# Patient Record
Sex: FEMALE | Race: WHITE | NOT HISPANIC OR LATINO | Employment: OTHER | URBAN - METROPOLITAN AREA
[De-identification: names, ages, dates, MRNs, and addresses within clinical notes are randomized per-mention and may not be internally consistent; named-entity substitution may affect disease eponyms.]

---

## 2017-01-09 ENCOUNTER — PATIENT OUTREACH (OUTPATIENT)
Dept: ADMINISTRATIVE | Facility: HOSPITAL | Age: 67
End: 2017-01-09

## 2017-01-09 RX ORDER — TRIAMCINOLONE ACETONIDE 1 MG/G
CREAM TOPICAL
Refills: 0 | OUTPATIENT
Start: 2017-01-09

## 2017-01-09 NOTE — PROGRESS NOTES
Most recent 2016 hemoglobin A1C routed to [employer] as attestation documentation for Diabetes Care-Blood Sugar Controlled measure. Measure has been met.

## 2017-01-13 RX ORDER — METFORMIN HYDROCHLORIDE 1000 MG/1
TABLET ORAL
Qty: 180 TABLET | Refills: 0 | Status: SHIPPED | OUTPATIENT
Start: 2017-01-13 | End: 2017-04-14 | Stop reason: SDUPTHER

## 2017-01-19 RX ORDER — FENOFIBRATE 200 MG/1
CAPSULE ORAL
Qty: 30 CAPSULE | Refills: 0 | Status: SHIPPED | OUTPATIENT
Start: 2017-01-19 | End: 2017-02-20 | Stop reason: SDUPTHER

## 2017-01-19 RX ORDER — CHLORTHALIDONE 50 MG/1
TABLET ORAL
Qty: 30 TABLET | Refills: 0 | Status: SHIPPED | OUTPATIENT
Start: 2017-01-19 | End: 2017-02-20 | Stop reason: SDUPTHER

## 2017-01-23 ENCOUNTER — OFFICE VISIT (OUTPATIENT)
Dept: INTERNAL MEDICINE | Facility: CLINIC | Age: 67
End: 2017-01-23
Payer: MEDICARE

## 2017-01-23 ENCOUNTER — LAB VISIT (OUTPATIENT)
Dept: LAB | Facility: HOSPITAL | Age: 67
End: 2017-01-23
Attending: INTERNAL MEDICINE
Payer: MEDICARE

## 2017-01-23 VITALS
DIASTOLIC BLOOD PRESSURE: 70 MMHG | BODY MASS INDEX: 31.64 KG/M2 | TEMPERATURE: 100 F | OXYGEN SATURATION: 95 % | HEART RATE: 104 BPM | HEIGHT: 59 IN | WEIGHT: 156.94 LBS | SYSTOLIC BLOOD PRESSURE: 128 MMHG

## 2017-01-23 DIAGNOSIS — R30.0 DYSURIA: Primary | ICD-10-CM

## 2017-01-23 DIAGNOSIS — N30.00 ACUTE CYSTITIS WITHOUT HEMATURIA: ICD-10-CM

## 2017-01-23 DIAGNOSIS — M62.838 MUSCLE SPASM: ICD-10-CM

## 2017-01-23 LAB
BACTERIA #/AREA URNS HPF: ABNORMAL /HPF
BILIRUB UR QL STRIP: NEGATIVE
CLARITY UR: ABNORMAL
COLOR UR: YELLOW
GLUCOSE UR QL STRIP: ABNORMAL
HGB UR QL STRIP: NEGATIVE
KETONES UR QL STRIP: NEGATIVE
LEUKOCYTE ESTERASE UR QL STRIP: ABNORMAL
MICROSCOPIC COMMENT: ABNORMAL
NITRITE UR QL STRIP: POSITIVE
NON-SQ EPI CELLS #/AREA URNS HPF: 1 /HPF
PH UR STRIP: 5 [PH] (ref 5–8)
PROT UR QL STRIP: NEGATIVE
SP GR UR STRIP: 1.01 (ref 1–1.03)
SQUAMOUS #/AREA URNS HPF: 4 /HPF
URN SPEC COLLECT METH UR: ABNORMAL
WBC #/AREA URNS HPF: >100 /HPF (ref 0–5)
WBC CLUMPS URNS QL MICRO: ABNORMAL

## 2017-01-23 PROCEDURE — 3045F PR MOST RECENT HEMOGLOBIN A1C LEVEL 7.0-9.0%: CPT | Mod: S$GLB,,, | Performed by: INTERNAL MEDICINE

## 2017-01-23 PROCEDURE — 1126F AMNT PAIN NOTED NONE PRSNT: CPT | Mod: S$GLB,,, | Performed by: INTERNAL MEDICINE

## 2017-01-23 PROCEDURE — 2022F DILAT RTA XM EVC RTNOPTHY: CPT | Mod: S$GLB,,, | Performed by: INTERNAL MEDICINE

## 2017-01-23 PROCEDURE — 87088 URINE BACTERIA CULTURE: CPT

## 2017-01-23 PROCEDURE — 99214 OFFICE O/P EST MOD 30 MIN: CPT | Mod: S$GLB,,, | Performed by: INTERNAL MEDICINE

## 2017-01-23 PROCEDURE — 99999 PR PBB SHADOW E&M-EST. PATIENT-LVL III: CPT | Mod: PBBFAC,,, | Performed by: INTERNAL MEDICINE

## 2017-01-23 PROCEDURE — 3074F SYST BP LT 130 MM HG: CPT | Mod: S$GLB,,, | Performed by: INTERNAL MEDICINE

## 2017-01-23 PROCEDURE — 87186 SC STD MICRODIL/AGAR DIL: CPT

## 2017-01-23 PROCEDURE — 87086 URINE CULTURE/COLONY COUNT: CPT

## 2017-01-23 PROCEDURE — 3078F DIAST BP <80 MM HG: CPT | Mod: S$GLB,,, | Performed by: INTERNAL MEDICINE

## 2017-01-23 PROCEDURE — 1157F ADVNC CARE PLAN IN RCRD: CPT | Mod: S$GLB,,, | Performed by: INTERNAL MEDICINE

## 2017-01-23 PROCEDURE — 1159F MED LIST DOCD IN RCRD: CPT | Mod: S$GLB,,, | Performed by: INTERNAL MEDICINE

## 2017-01-23 PROCEDURE — 87077 CULTURE AEROBIC IDENTIFY: CPT

## 2017-01-23 PROCEDURE — 4010F ACE/ARB THERAPY RXD/TAKEN: CPT | Mod: S$GLB,,, | Performed by: INTERNAL MEDICINE

## 2017-01-23 PROCEDURE — 1160F RVW MEDS BY RX/DR IN RCRD: CPT | Mod: S$GLB,,, | Performed by: INTERNAL MEDICINE

## 2017-01-23 PROCEDURE — 99499 UNLISTED E&M SERVICE: CPT | Mod: S$GLB,,, | Performed by: INTERNAL MEDICINE

## 2017-01-23 RX ORDER — SULFAMETHOXAZOLE AND TRIMETHOPRIM 800; 160 MG/1; MG/1
1 TABLET ORAL 2 TIMES DAILY
Qty: 20 TABLET | Refills: 0 | Status: SHIPPED | OUTPATIENT
Start: 2017-01-23 | End: 2017-02-02

## 2017-01-23 RX ORDER — CYCLOBENZAPRINE HCL 5 MG
5 TABLET ORAL NIGHTLY PRN
Qty: 30 TABLET | Refills: 1 | Status: SHIPPED | OUTPATIENT
Start: 2017-01-23 | End: 2017-07-15 | Stop reason: SDUPTHER

## 2017-01-23 NOTE — MR AVS SNAPSHOT
On license of [facility]  [address]  [place] [address]  Phone: [phone]  Fax: [phone]                  Luda Jc   2017 2:40 PM   Office Visit    Description:  Female : 1950   Provider:  Leanne Bonilla DO   Department[facility]  [facility]           Reason for Visit     Urinary Tract Infection           Diagnoses this Visit        Comments    Dysuria    -  Primary     Acute cystitis without hematuria         Uncontrolled type 2 diabetes mellitus with stage 3 chronic kidney disease, with long-term current use of insulin         Muscle spasm                To Do List           Future Appointments        Provider Department Dept Phone    3/6/2017 1:30 PM Arabella Srivastava, NP-C, CDE [facility] [phone]    2017 9:10 AM LABORATORY, [address] [facility] [phone]    2017 11:00 AM Leanne Bonilla DO On license of [facility] [phone]      Goals (5 Years of Data)              16    Exercise 3x per week (30 min per time)           HEMOGLOBIN A1C < 7.0   7.6  10.7  8.6    LDL CHOLESTEROL < 100     Invalid, Trig>400.0  Invalid, Trig>400.0       These Medications        Disp Refills Start End    sulfamethoxazole-trimethoprim 800-160mg (BACTRIM DS) 800-160 mg Tab 20 tablet 0 2017    Take 1 tablet by mouth 2 (two) times daily. - Oral    Pharmacy: [facility] [place], [place] - [address] [address] Ph #: [phone]       cyclobenzaprine (FLEXERIL) 5 MG tablet 30 tablet 1 2017     Take 1 tablet (5 mg total) by mouth nightly as needed for Muscle spasms. - Oral    Pharmacy: [facility] [place], [place] - [address] [address] Ph #: [phone]         [facility]     [facility] On Call Nurse Care Line -  Assistance  Registered nurses in the [facility] On Call Center provide clinical advisement, health education, appointment booking, and  "other advisory services.  Call for this free service at [phone].             Medications           Message regarding Medications     Verify the changes and/or additions to your medication regime listed below are the same as discussed with your clinician today.  If any of these changes or additions are incorrect, please notify your healthcare provider.        START taking these NEW medications        Refills    sulfamethoxazole-trimethoprim 800-160mg (BACTRIM DS) 800-160 mg Tab 0    Sig: Take 1 tablet by mouth 2 (two) times daily.    Class: Normal    Route: Oral    cyclobenzaprine (FLEXERIL) 5 MG tablet 1    Sig: Take 1 tablet (5 mg total) by mouth nightly as needed for Muscle spasms.    Class: Normal    Route: Oral           Verify that the below list of medications is an accurate representation of the medications you are currently taking.  If none reported, the list may be blank. If incorrect, please contact your healthcare provider. Carry this list with you in case of emergency.           Current Medications     atorvastatin (LIPITOR) 80 MG tablet Take 1 tablet (80 mg total) by mouth once daily.    blood sugar diagnostic (CONTOUR TEST STRIPS) Strp USE ONE STRIP TO CHECK GLUCOSE 2 TO 3 TIMES DAILY AS DIRECTED    chlorthalidone (HYGROTEN) 50 MG Tab TAKE ONE TABLET BY MOUTH ONCE DAILY    esomeprazole (NEXIUM) 20 MG capsule Take 20 mg by mouth before breakfast.    fenofibrate micronized (LOFIBRA) 200 MG Cap TAKE ONE CAPSULE BY MOUTH ONCE DAILY WITH BREAKFAST    glimepiride (AMARYL) 2 MG tablet Take 1 tablet (2 mg total) by mouth daily with breakfast.    insulin glargine (LANTUS SOLOSTAR) 100 unit/mL (3 mL) InPn pen 34 units in the morning and 30 units in the evening.    insulin lispro (HUMALOG KWIKPEN) 100 unit/mL InPn pen Inject 10 Units into the skin 3 (three) times daily before meals.    insulin needles, disposable, (BD ULTRA-FINE FATEMEH PEN NEEDLES) 32 x 5/32 " Ndle 1 Stick by Misc.(Non-Drug; Combo Route) " "route as directed. 250.00 IDDM    lisinopril (PRINIVIL,ZESTRIL) 40 MG tablet Take 1 tablet (40 mg total) by mouth once daily.    metformin (GLUCOPHAGE) 1000 MG tablet TAKE ONE TABLET BY MOUTH TWICE DAILY. PLEASE CALL OFFICE. DR. SAHNI LEAVING IN SEVERAL WEEKS. YOU NEED A NEW PCP    MICROLET LANCET Misc USE AS DIRECTED TWICE DAILY    nystatin (MYCOSTATIN) powder Apply topically 2 (two) times daily.    tamoxifen (NOLVADEX) 20 MG Tab     triamcinolone acetonide 0.1% (KENALOG) 0.1 % cream Apply topically 3 (three) times daily.    cyclobenzaprine (FLEXERIL) 5 MG tablet Take 1 tablet (5 mg total) by mouth nightly as needed for Muscle spasms.    sulfamethoxazole-trimethoprim 800-160mg (BACTRIM DS) 800-160 mg Tab Take 1 tablet by mouth 2 (two) times daily.           Clinical Reference Information           Vital Signs - Last Recorded  Most recent update: 1/23/2017  2:40 PM by Lesa Tidwell    BP Pulse Temp Ht    128/70 (BP Location: Left arm, Patient Position: Sitting, BP Method: Manual) 104 99.5 °F (37.5 °C) (Tympanic) 4' 11" (1.499 m)    Wt SpO2 BMI    71.2 kg (156 lb 15.5 oz) 95% 31.7 kg/m2      Blood Pressure          Most Recent Value    BP  128/70      Allergies as of 1/23/2017     No Known Allergies      Immunizations Administered on Date of Encounter - 1/23/2017     None      Orders Placed During Today's Visit      Normal Orders This Visit    Urinalysis Microscopic     Urinalysis     Future Labs/Procedures Expected by Expires    Urine culture  1/23/2017 3/24/2018      "

## 2017-01-25 NOTE — PROGRESS NOTES
Luda Jc  66 y.o. White female    Chief Complaint   Patient presents with    Urinary Tract Infection     HPI: Presents to the clinic with complaint of dysuria for the past few days. She denies fever or back pain. She denies blood in her urine. She is a diabetes and although her diabetes has improved, she is not at goal. She has CKD III as well.   She is complaining of muscle spasms primarily in her legs during the day and worse at night. She states this has been going on for a while, but is getting worse. She denies numbness, tingling or weakness in her extremities.     Past Medical History   Diagnosis Date    Abnormal LFTs (liver function tests) 10/11/2014    Arthritis     Diabetes mellitus with stage 3 chronic kidney disease 10/11/2014    Insulin long-term use 10/11/2014    Obesity     Other and unspecified hyperlipidemia     Stage II infiltrating ductal carcinoma of breast 6/16/2014     Followed by Dr. Virgen     Unspecified essential hypertension        Current Outpatient Prescriptions on File Prior to Visit   Medication Sig Dispense Refill    atorvastatin (LIPITOR) 80 MG tablet Take 1 tablet (80 mg total) by mouth once daily. 90 tablet 3    blood sugar diagnostic (CONTOUR TEST STRIPS) Strp USE ONE STRIP TO CHECK GLUCOSE 2 TO 3 TIMES DAILY AS DIRECTED 100 each 11    chlorthalidone (HYGROTEN) 50 MG Tab TAKE ONE TABLET BY MOUTH ONCE DAILY 30 tablet 0    esomeprazole (NEXIUM) 20 MG capsule Take 20 mg by mouth before breakfast.      fenofibrate micronized (LOFIBRA) 200 MG Cap TAKE ONE CAPSULE BY MOUTH ONCE DAILY WITH BREAKFAST 30 capsule 0    glimepiride (AMARYL) 2 MG tablet Take 1 tablet (2 mg total) by mouth daily with breakfast. 60 tablet 6    insulin glargine (LANTUS SOLOSTAR) 100 unit/mL (3 mL) InPn pen 34 units in the morning and 30 units in the evening. 3 Box 0    insulin lispro (HUMALOG KWIKPEN) 100 unit/mL InPn pen Inject 10 Units into the skin 3 (three) times daily before meals. 1 Box  "6    insulin needles, disposable, (BD ULTRA-FINE FATEMEH PEN NEEDLES) 32 x 5/32 " Ndle 1 Stick by Misc.(Non-Drug; Combo Route) route as directed. 250.00 IDDM 100 each 11    lisinopril (PRINIVIL,ZESTRIL) 40 MG tablet Take 1 tablet (40 mg total) by mouth once daily. 30 tablet 3    metformin (GLUCOPHAGE) 1000 MG tablet TAKE ONE TABLET BY MOUTH TWICE DAILY. PLEASE CALL OFFICE. DR. SAHNI LEAVING IN SEVERAL WEEKS. YOU NEED A NEW  tablet 0    MICROLET LANCET [facility] USE AS DIRECTED TWICE DAILY 100 each 11    nystatin (MYCOSTATIN) powder Apply topically 2 (two) times daily. 30 g 1    tamoxifen (NOLVADEX) 20 MG Tab       triamcinolone acetonide 0.1% (KENALOG) 0.1 % cream Apply topically 3 (three) times daily. 15 g 3     No current facility-administered medications on file prior to visit.        Allergies:  Review of patient's allergies indicates:  No Known Allergies    PHYSICAL EXAM:  VITAL SIGNS: Reviewed  GENERAL: Alert and oriented, no acute distress  HEART: Normal S1 and S2, regular rate and rhythm  LUNGS: Bilaterally clear to auscultation, respirations unlabored    ASSESSMENT/PLAN:  Luda GOMEZ was seen today for urinary tract infection.    Diagnoses and all orders for this visit:    Dysuria  -     Urinalysis--positive for a UTI    Acute cystitis without hematuria  -     sulfamethoxazole-trimethoprim 800-160mg (BACTRIM DS) 800-160 mg Tab; Take 1 tablet by mouth 2 (two) times daily.  -     Recommend increased water intake and regular voiding   -     Urine culture; Future    Uncontrolled type 2 diabetes mellitus with stage 3 chronic kidney disease, with long-term current use of insulin  -     Continue current management  -     Advised to avoid NSAIDs    Muscle spasm  -     cyclobenzaprine (FLEXERIL) 5 MG tablet; Take 1 tablet (5 mg total) by mouth nightly as needed for Muscle spasms.    F/U in 3 months      "

## 2017-01-26 LAB — BACTERIA UR CULT: NORMAL

## 2017-02-07 RX ORDER — GLIMEPIRIDE 2 MG/1
TABLET ORAL
Qty: 60 TABLET | Refills: 6 | Status: SHIPPED | OUTPATIENT
Start: 2017-02-07 | End: 2018-02-21 | Stop reason: SDUPTHER

## 2017-02-20 RX ORDER — FENOFIBRATE 200 MG/1
CAPSULE ORAL
Qty: 30 CAPSULE | Refills: 6 | Status: SHIPPED | OUTPATIENT
Start: 2017-02-20 | End: 2017-09-26 | Stop reason: SDUPTHER

## 2017-02-20 RX ORDER — CHLORTHALIDONE 50 MG/1
TABLET ORAL
Qty: 30 TABLET | Refills: 6 | Status: SHIPPED | OUTPATIENT
Start: 2017-02-20 | End: 2017-04-28 | Stop reason: SDUPTHER

## 2017-03-03 RX ORDER — INSULIN GLARGINE 100 [IU]/ML
INJECTION, SOLUTION SUBCUTANEOUS
Qty: 45 ML | Refills: 1 | Status: SHIPPED | OUTPATIENT
Start: 2017-03-03 | End: 2017-10-06 | Stop reason: SDUPTHER

## 2017-03-10 ENCOUNTER — PATIENT OUTREACH (OUTPATIENT)
Dept: ADMINISTRATIVE | Facility: HOSPITAL | Age: 67
End: 2017-03-10

## 2017-03-10 NOTE — PROGRESS NOTES
Diabetic [facility] for Diabetes Care-Eye Exam. Measure has been satisfied.

## 2017-03-10 NOTE — PROGRESS NOTES
Last documented Hemoglobing A1C [facility] to [facility] documentation for diabetes Care. Measure has been satisfied.

## 2017-03-21 RX ORDER — INSULIN ASPART 100 [IU]/ML
10 INJECTION, SOLUTION INTRAVENOUS; SUBCUTANEOUS
Qty: 1 BOX | Refills: 3 | Status: SHIPPED | OUTPATIENT
Start: 2017-03-21 | End: 2018-03-15 | Stop reason: SDUPTHER

## 2017-03-21 RX ORDER — INSULIN LISPRO 100 [IU]/ML
10 INJECTION, SOLUTION INTRAVENOUS; SUBCUTANEOUS
Qty: 1 BOX | Refills: 3 | Status: SHIPPED | OUTPATIENT
Start: 2017-03-21 | End: 2017-03-21 | Stop reason: CLARIF

## 2017-03-21 NOTE — TELEPHONE ENCOUNTER
Spoke with patient's . Updated him a fax was received from the pharmacy stating Humalog is no longer covered under pt's formulary, and Novolog is preferred. New Rx sent in for covered insulin. Pt's  voiced understanding, and stated he would inform pt of change.

## 2017-03-31 RX ORDER — LISINOPRIL 40 MG/1
TABLET ORAL
Qty: 30 TABLET | Refills: 0 | Status: SHIPPED | OUTPATIENT
Start: 2017-03-31 | End: 2017-04-28 | Stop reason: SDUPTHER

## 2017-03-31 RX ORDER — ATORVASTATIN CALCIUM 80 MG/1
TABLET, FILM COATED ORAL
Qty: 90 TABLET | Refills: 0 | Status: SHIPPED | OUTPATIENT
Start: 2017-03-31 | End: 2017-06-30 | Stop reason: SDUPTHER

## 2017-04-17 RX ORDER — METFORMIN HYDROCHLORIDE 1000 MG/1
1000 TABLET ORAL 2 TIMES DAILY WITH MEALS
Qty: 180 TABLET | Refills: 1 | Status: SHIPPED | OUTPATIENT
Start: 2017-04-17 | End: 2017-10-16 | Stop reason: SDUPTHER

## 2017-04-20 ENCOUNTER — PATIENT OUTREACH (OUTPATIENT)
Dept: ADMINISTRATIVE | Facility: HOSPITAL | Age: 67
End: 2017-04-20
Payer: MEDICARE

## 2017-04-20 DIAGNOSIS — Z78.0 ASYMPTOMATIC MENOPAUSE: Primary | ICD-10-CM

## 2017-04-24 ENCOUNTER — APPOINTMENT (OUTPATIENT)
Dept: RADIOLOGY | Facility: CLINIC | Age: 67
End: 2017-04-24
Payer: MEDICARE

## 2017-04-24 DIAGNOSIS — Z78.0 ASYMPTOMATIC MENOPAUSE: ICD-10-CM

## 2017-04-24 PROCEDURE — 77080 DXA BONE DENSITY AXIAL: CPT | Mod: TC

## 2017-04-24 PROCEDURE — 77080 DXA BONE DENSITY AXIAL: CPT | Mod: 26,,, | Performed by: INTERNAL MEDICINE

## 2017-04-28 ENCOUNTER — OFFICE VISIT (OUTPATIENT)
Dept: INTERNAL MEDICINE | Facility: CLINIC | Age: 67
End: 2017-04-28
Payer: MEDICARE

## 2017-04-28 VITALS
WEIGHT: 161.19 LBS | TEMPERATURE: 99 F | HEART RATE: 94 BPM | HEIGHT: 59 IN | BODY MASS INDEX: 32.49 KG/M2 | SYSTOLIC BLOOD PRESSURE: 134 MMHG | DIASTOLIC BLOOD PRESSURE: 68 MMHG

## 2017-04-28 DIAGNOSIS — Z23 IMMUNIZATION DUE: ICD-10-CM

## 2017-04-28 DIAGNOSIS — M85.80 OSTEOPENIA WITH HIGH RISK OF FRACTURE: ICD-10-CM

## 2017-04-28 DIAGNOSIS — E78.5 HYPERLIPIDEMIA LDL GOAL <100: ICD-10-CM

## 2017-04-28 DIAGNOSIS — I10 HYPERTENSION GOAL BP (BLOOD PRESSURE) < 130/80: Chronic | ICD-10-CM

## 2017-04-28 DIAGNOSIS — M89.9 DISORDER OF BONE: ICD-10-CM

## 2017-04-28 DIAGNOSIS — R05.9 COUGH: ICD-10-CM

## 2017-04-28 DIAGNOSIS — C50.911: ICD-10-CM

## 2017-04-28 DIAGNOSIS — E78.1 HYPERTRIGLYCERIDEMIA: Primary | ICD-10-CM

## 2017-04-28 PROCEDURE — G0009 ADMIN PNEUMOCOCCAL VACCINE: HCPCS | Mod: S$GLB,,, | Performed by: INTERNAL MEDICINE

## 2017-04-28 PROCEDURE — 99499 UNLISTED E&M SERVICE: CPT | Mod: S$GLB,,, | Performed by: INTERNAL MEDICINE

## 2017-04-28 PROCEDURE — 1159F MED LIST DOCD IN RCRD: CPT | Mod: S$GLB,,, | Performed by: INTERNAL MEDICINE

## 2017-04-28 PROCEDURE — 99214 OFFICE O/P EST MOD 30 MIN: CPT | Mod: 25,S$GLB,, | Performed by: INTERNAL MEDICINE

## 2017-04-28 PROCEDURE — 99999 PR PBB SHADOW E&M-EST. PATIENT-LVL III: CPT | Mod: PBBFAC,,, | Performed by: INTERNAL MEDICINE

## 2017-04-28 PROCEDURE — 1160F RVW MEDS BY RX/DR IN RCRD: CPT | Mod: S$GLB,,, | Performed by: INTERNAL MEDICINE

## 2017-04-28 PROCEDURE — 4010F ACE/ARB THERAPY RXD/TAKEN: CPT | Mod: S$GLB,,, | Performed by: INTERNAL MEDICINE

## 2017-04-28 PROCEDURE — 3045F PR MOST RECENT HEMOGLOBIN A1C LEVEL 7.0-9.0%: CPT | Mod: S$GLB,,, | Performed by: INTERNAL MEDICINE

## 2017-04-28 PROCEDURE — 90732 PPSV23 VACC 2 YRS+ SUBQ/IM: CPT | Mod: S$GLB,,, | Performed by: INTERNAL MEDICINE

## 2017-04-28 PROCEDURE — 3075F SYST BP GE 130 - 139MM HG: CPT | Mod: S$GLB,,, | Performed by: INTERNAL MEDICINE

## 2017-04-28 PROCEDURE — 3078F DIAST BP <80 MM HG: CPT | Mod: S$GLB,,, | Performed by: INTERNAL MEDICINE

## 2017-04-28 RX ORDER — LISINOPRIL 40 MG/1
40 TABLET ORAL DAILY
Qty: 30 TABLET | Refills: 6 | Status: SHIPPED | OUTPATIENT
Start: 2017-04-28 | End: 2017-11-03 | Stop reason: SDUPTHER

## 2017-04-28 RX ORDER — BENZONATATE 100 MG/1
100 CAPSULE ORAL 3 TIMES DAILY PRN
Qty: 30 CAPSULE | Refills: 0 | Status: SHIPPED | OUTPATIENT
Start: 2017-04-28 | End: 2017-05-08

## 2017-04-28 RX ORDER — CHLORTHALIDONE 50 MG/1
50 TABLET ORAL DAILY
Qty: 30 TABLET | Refills: 6 | Status: SHIPPED | OUTPATIENT
Start: 2017-04-28 | End: 2018-03-06 | Stop reason: SDUPTHER

## 2017-04-28 NOTE — MR AVS SNAPSHOT
[facility]  [address]  [place] [address]  Phone: [phone]  Fax: [phone]                  Luda Jc   2017 11:00 AM   Office Visit    Description:  Female : 1950   Provider:  Leanne Bonilla DO   [facility]           Reason for Visit     Follow-up           Diagnoses this Visit        Comments    Hypertriglyceridemia    -  Primary     Hyperlipidemia LDL goal <100         Uncontrolled type 2 diabetes mellitus with stage 3 chronic kidney disease, with long-term current use of insulin         Hypertension goal BP (blood pressure) < 130/80         Cough         Osteopenia with high risk of fracture         Disorder of bone         Immunization due                To Do List           Future Appointments        Provider Department Dept Phone    2017 8:50 AM LABORATORY, [address] [facility] [phone]    8/3/2017 10:40 AM Leanne Bonilla DO [facility] [phone]      Goals (5 Years of Data)              17    Exercise 3x per week (30 min per time)           HEMOGLOBIN A1C < 7.0   7.4  7.6  10.7    LDL CHOLESTEROL < 100   Invalid, Trig>400.0    Invalid, Trig>400.0       These Medications        Disp Refills Start End    lisinopril (PRINIVIL,ZESTRIL) 40 MG tablet 30 tablet 6 2017     Take 1 tablet (40 mg total) by mouth once daily. - Oral    Pharmacy: [facility] [id number]  [place]  [address] [address] [facility] Ph #: [phone]       chlorthalidone (HYGROTEN) 50 MG Tab 30 tablet 6 2017     Take 1 tablet (50 mg total) by mouth once daily. - Oral    Pharmacy: [facility] [id number]  [place], [place] - [address] [address] [facility] Ph #: [phone]       benzonatate (TESSALON) 100 MG capsule 30 capsule 0 2017    Take 1 capsule (100 mg total) by mouth 3 (three) times daily as needed for Cough. - Oral    Pharmacy: [facility]  [place],  [place] - [address] [address] [facility] #: [phone]         PURCHASE these Medications (No prescription required)        Start End    omega-3 fatty acids-vitamin E (FISH OIL) 1,000 mg Cap 4/28/2017 4/28/2018    Sig - Route: Take 1 capsule by mouth once daily. - Oral    Class: OTC      [facility]     [facility] On Call Nurse Care Line - 24/7 Assistance  Unless otherwise directed by your provider, please contact [facility], our nurse care line that is available for 24/7 assistance.     Registered nurses in the [facility] On Call Center provide: appointment scheduling, clinical advisement, health education, and other advisory services.  Call: [phone] (toll free)               Medications           Message regarding Medications     Verify the changes and/or additions to your medication regime listed below are the same as discussed with your clinician today.  If any of these changes or additions are incorrect, please notify your healthcare provider.        START taking these NEW medications        Refills    omega-3 fatty acids-vitamin E (FISH OIL) 1,000 mg Cap 0    Sig: Take 1 capsule by mouth once daily.    Class: OTC    Route: Oral    benzonatate (TESSALON) 100 MG capsule 0    Sig: Take 1 capsule (100 mg total) by mouth 3 (three) times daily as needed for Cough.    Class: Normal    Route: Oral      CHANGE how you are taking these medications     Start Taking Instead of    lisinopril (PRINIVIL,ZESTRIL) 40 MG tablet lisinopril (PRINIVIL,ZESTRIL) 40 MG tablet    Dosage:  Take 1 tablet (40 mg total) by mouth once daily. Dosage:  TAKE ONE TABLET BY MOUTH ONCE DAILY    Reason for Change:  Reorder     chlorthalidone (HYGROTEN) 50 MG Tab chlorthalidone (HYGROTEN) 50 MG Tab    Dosage:  Take 1 tablet (50 mg total) by mouth once daily. Dosage:  TAKE ONE TABLET BY MOUTH ONCE DAILY    Reason for Change:  Reorder            Verify that the below list of medications is an accurate representation of the medications you are  "currently taking.  If none reported, the list may be blank. If incorrect, please contact your healthcare provider. Carry this list with you in case of emergency.           Current Medications     atorvastatin (LIPITOR) 80 MG tablet TAKE ONE TABLET BY MOUTH ONCE DAILY    blood sugar diagnostic (CONTOUR TEST STRIPS) Strp USE ONE STRIP TO CHECK GLUCOSE 2 TO 3 TIMES DAILY AS DIRECTED    chlorthalidone (HYGROTEN) 50 MG Tab Take 1 tablet (50 mg total) by mouth once daily.    cyclobenzaprine (FLEXERIL) 5 MG tablet Take 1 tablet (5 mg total) by mouth nightly as needed for Muscle spasms.    esomeprazole (NEXIUM) 20 MG capsule Take 20 mg by mouth before breakfast.    fenofibrate micronized (LOFIBRA) 200 MG Cap TAKE ONE CAPSULE BY MOUTH ONCE DAILY WITH BREAKFAST    glimepiride (AMARYL) 2 MG tablet TAKE ONE TABLET BY MOUTH ONCE DAILY WITH  BREAKFAST    insulin aspart (NOVOLOG) 100 unit/mL InPn pen Inject 10 Units into the skin 3 (three) times daily with meals.    insulin needles, disposable, ([employer] ULTRA-FINE FATEMEH PEN NEEDLES) 32 x 5/32 " Ndle 1 Stick by Misc.(Non-Drug; Combo Route) route as directed. 250.00 IDDM    LANTUS SOLOSTAR 100 unit/mL (3 mL) InPn pen INJECT 34 UNITS SUBCUTANEOUSLY IN THE MORNING AND 30 UNITS IN THE EVENING    lisinopril (PRINIVIL,ZESTRIL) 40 MG tablet Take 1 tablet (40 mg total) by mouth once daily.    metformin (GLUCOPHAGE) 1000 MG tablet Take 1 tablet (1,000 mg total) by mouth 2 (two) times daily with meals.    MICROLET LANCET Misc USE AS DIRECTED TWICE DAILY    nystatin (MYCOSTATIN) powder Apply topically 2 (two) times daily.    tamoxifen (NOLVADEX) 20 MG Tab     triamcinolone acetonide 0.1% (KENALOG) 0.1 % cream Apply topically 3 (three) times daily.    benzonatate (TESSALON) 100 MG capsule Take 1 capsule (100 mg total) by mouth 3 (three) times daily as needed for Cough.    omega-3 fatty acids-vitamin E (FISH OIL) 1,000 mg Cap Take 1 capsule by mouth once daily.           Clinical Reference " "Information           Your Vitals Were     BP Pulse Temp    134/68 (BP Location: Left arm, Patient Position: Sitting, BP Method: Manual) 94 99.3 °F (37.4 °C) (Tympanic)    Height Weight BMI    4' 10.5" (1.486 m) 73.1 kg (161 lb 2.5 oz) 33.11 kg/m2      Blood Pressure          Most Recent Value    BP  134/68      Allergies as of 4/28/2017     No Known Allergies      Immunizations Administered on Date of Encounter - 4/28/2017     Name Date Dose VIS Date Route    Pneumococcal Polysaccharide - 23 Valent  Incomplete 0.5 mL 4/24/2015 Intramuscular      Orders Placed During Today's Visit      Normal Orders This Visit    Pneumococcal Polysaccharide Vaccine (23 Valent) (SQ/IM)     Future Labs/Procedures Expected by Expires    Vitamin D  4/28/2017 10/26/2017      Language Assistance Services     ATTENTION: Language assistance services are available, free of charge. Please call [phone].      ATENCIÓN: Si habla apolinar, tiene a cervantes disposición servicios gratuitos de asistencia lingüística. Llame al [phone].     CHÚ Ý: N?u b?n nói Ti?ng Vi?t, có các d?ch v? h? tr? ngôn ng? mi?n phí dành cho b?n. G?i s? [phone].         [facility] complies with applicable Federal civil rights laws and does not discriminate on the basis of race, color, national origin, age, disability, or sex.        "

## 2017-04-28 NOTE — PROGRESS NOTES
Subjective:       Patient ID: Luda Jc is a 66 y.o. female.    Chief Complaint: Follow-up    HPI Comments: Luda Jc  66 y.o. White female    Patient presents with:  Follow-up    HPI: Here today to follow up on chronic conditions.  Hyperlipidemia--uncontrolled. She reports compliance with atorvastatin and fenofibrate. She denies eating an excessive amount of sweets.                      CHOL                     177                 04/24/2017                         HDL                      20 (L)              04/24/2017                             LDLCALC                                      04/24/2017             Invalid, Trig>400.0       TRIG                     677 (H)             04/24/2017            Diabetes--improved. She has been compliant with metformin, glimepiride, Lantus and Novolog. She has made some dietary changes.                        HGBA1C                   7.4 (H)             04/24/2017            CKD III--stable. She denies symptoms.   HTN--stable on lisinopril and chlorthalidone.   She has been having a cough for the past few days. She reports a scratchy throat. She denies a fever or productive cough. She has been taking an OTC antihistamine.   Recent DEXA shows osteopenia with a high fracture risk.   She has a history of breast cancer. She is on Tamoxifen.     Past Medical History:  10/11/2014: Abnormal LFTs (liver function tests)  Arthritis  10/11/2014: Diabetes mellitus with stage 3 chronic kidney *  10/11/2014: Insulin long-term use  Obesity  Other and unspecified hyperlipidemia  6/16/2014: Stage II infiltrating ductal carcinoma of court*      Comment: Followed by Dr. Virgen   Unspecified essential hypertension    Current Outpatient Prescriptions on File Prior to Visit:  atorvastatin (LIPITOR) 80 MG tablet, TAKE ONE TABLET BY MOUTH ONCE DAILY, Disp: 90 tablet, Rfl: 0  blood sugar diagnostic (CONTOUR TEST STRIPS) Strp, USE ONE STRIP TO CHECK GLUCOSE 2 TO 3 TIMES DAILY AS DIRECTED,  "Disp: 100 each, Rfl: 11  cyclobenzaprine (FLEXERIL) 5 MG tablet, Take 1 tablet (5 mg total) by mouth nightly as needed for Muscle spasms., Disp: 30 tablet, Rfl: 1  esomeprazole (NEXIUM) 20 MG capsule, Take 20 mg by mouth before breakfast., Disp: , Rfl:   fenofibrate micronized (LOFIBRA) 200 MG Cap, TAKE ONE CAPSULE BY MOUTH ONCE DAILY WITH BREAKFAST, Disp: 30 capsule, Rfl: 6  glimepiride (AMARYL) 2 MG tablet, TAKE ONE TABLET BY MOUTH ONCE DAILY WITH  BREAKFAST, Disp: 60 tablet, Rfl: 6  insulin aspart (NOVOLOG) 100 unit/mL InPn pen, Inject 10 Units into the skin 3 (three) times daily with meals., Disp: 1 Box, Rfl: 3  insulin needles, disposable, ([employer] ULTRA-FINE FATEMEH PEN NEEDLES) 32 x 5/32 " Ndle, 1 Stick by Misc.(Non-Drug; Combo Route) route as directed. 250.00 IDDM, Disp: 100 each, Rfl: 11  LANTUS SOLOSTAR 100 unit/mL (3 mL) InPn pen, INJECT 34 UNITS SUBCUTANEOUSLY IN THE MORNING AND 30 UNITS IN THE EVENING, Disp: 45 mL, Rfl: 1  metformin (GLUCOPHAGE) 1000 MG tablet, Take 1 tablet (1,000 mg total) by mouth 2 (two) times daily with meals., Disp: 180 tablet, Rfl: 1  MICROLET LANCET [facility], USE AS DIRECTED TWICE DAILY, Disp: 100 each, Rfl: 11  nystatin (MYCOSTATIN) powder, Apply topically 2 (two) times daily., Disp: 30 g, Rfl: 1  tamoxifen (NOLVADEX) 20 MG Tab, , Disp: , Rfl:   triamcinolone acetonide 0.1% (KENALOG) 0.1 % cream, Apply topically 3 (three) times daily., Disp: 15 g, Rfl: 3  chlorthalidone (HYGROTEN) 50 MG Tab, TAKE ONE TABLET BY MOUTH ONCE DAILY, Disp: 30 tablet, Rfl: 6  lisinopril (PRINIVIL,ZESTRIL) 40 MG tablet, TAKE ONE TABLET BY MOUTH ONCE DAILY, Disp: 30 tablet, Rfl: 0    Allergies:  Review of patient's allergies indicates:  No Known Allergies        Review of Systems   Constitutional: Negative for fever and unexpected weight change.   HENT: Positive for postnasal drip and sore throat.    Respiratory: Positive for cough. Negative for shortness of breath.    Cardiovascular: Negative for chest pain and " leg swelling.   Gastrointestinal: Negative for abdominal pain, constipation and diarrhea.   Genitourinary: Negative for dysuria.   Musculoskeletal: Negative for gait problem.   Neurological: Negative for dizziness and headaches.       Objective:      Physical Exam   Constitutional: She is oriented to person, place, and time. She appears well-developed and well-nourished.   Eyes: Conjunctivae are normal. No scleral icterus.   Neck: No tracheal deviation present. No thyromegaly present.   Cardiovascular: Normal rate and regular rhythm.    Pulmonary/Chest: Effort normal and breath sounds normal. No respiratory distress.   Abdominal: Soft. Bowel sounds are normal.   Lymphadenopathy:     She has no cervical adenopathy.   Neurological: She is alert and oriented to person, place, and time.   Skin: Skin is warm and dry.   Psychiatric: She has a normal mood and affect.   Vitals reviewed.      Assessment:       1. Hypertriglyceridemia    2. Hyperlipidemia LDL goal <100    3. Uncontrolled type 2 diabetes mellitus with stage 3 chronic kidney disease, with long-term current use of insulin    4. Hypertension goal BP (blood pressure) < 130/80    5. Cough    6. Osteopenia with high risk of fracture    7. Disorder of bone    8. Stage 2 infiltrating duct carcinoma of female breast, right    9. Immunization due        Plan:       Luda GOMEZ was seen today for follow-up.    Diagnoses and all orders for this visit:    Hypertriglyceridemia  -     Add omega-3 fatty acids-vitamin E (FISH OIL) 1,000 mg Cap; Take 1 capsule by mouth once daily.  -     Continue fenofibrate   -     Continue atorvastatin  -     Counseled regarding lifestyle modifications     Hyperlipidemia LDL goal <100  -     Continue atorvastatin    Uncontrolled type 2 diabetes mellitus with stage 3 chronic kidney disease, with long-term current use of insulin  -     Continue current management    Hypertension goal BP (blood pressure) < 130/80  -     lisinopril  (PRINIVIL,ZESTRIL) 40 MG tablet; Take 1 tablet (40 mg total) by mouth once daily.  -     chlorthalidone (HYGROTEN) 50 MG Tab; Take 1 tablet (50 mg total) by mouth once daily.    Cough  -     benzonatate (TESSALON) 100 MG capsule; Take 1 capsule (100 mg total) by mouth 3 (three) times daily as needed for Cough.    Osteopenia with high risk of fracture  -     Consider Prolia since she is on Tamoxifen and has CKD III    Disorder of bone  -     Vitamin D; Future    Stage 2 infiltrating duct carcinoma of female breast, right  -     F/U with oncology    Immunization due  -     Pneumococcal Polysaccharide Vaccine (23 Valent) (SQ/IM)    Labs and f/u in 3 months

## 2017-05-04 ENCOUNTER — TELEPHONE (OUTPATIENT)
Dept: INTERNAL MEDICINE | Facility: CLINIC | Age: 67
End: 2017-05-04

## 2017-05-04 NOTE — TELEPHONE ENCOUNTER
----- Message from Tawny Griggs sent at 5/3/2017  9:00 AM CDT -----  Dr Vrigen's office, is calling for a copy of Bone Density results, please call Tian [phone] fax # [phone]

## 2017-05-30 ENCOUNTER — PATIENT OUTREACH (OUTPATIENT)
Dept: ADMINISTRATIVE | Facility: HOSPITAL | Age: 67
End: 2017-05-30

## 2017-06-30 RX ORDER — ATORVASTATIN CALCIUM 80 MG/1
TABLET, FILM COATED ORAL
Qty: 90 TABLET | Refills: 1 | Status: SHIPPED | OUTPATIENT
Start: 2017-06-30 | End: 2017-12-27 | Stop reason: SDUPTHER

## 2017-07-15 DIAGNOSIS — M62.838 MUSCLE SPASM: ICD-10-CM

## 2017-07-17 RX ORDER — CYCLOBENZAPRINE HCL 5 MG
TABLET ORAL
Qty: 30 TABLET | Refills: 0 | Status: SHIPPED | OUTPATIENT
Start: 2017-07-17 | End: 2017-11-03 | Stop reason: SDUPTHER

## 2017-07-18 ENCOUNTER — PATIENT OUTREACH (OUTPATIENT)
Dept: ADMINISTRATIVE | Facility: HOSPITAL | Age: 67
End: 2017-07-18

## 2017-07-18 NOTE — PROGRESS NOTES
Attempted to schedule diabetic eye exam, patient not available, left voicemail. I will send letter.

## 2017-08-01 ENCOUNTER — LAB VISIT (OUTPATIENT)
Dept: LAB | Facility: HOSPITAL | Age: 67
End: 2017-08-01
Attending: INTERNAL MEDICINE
Payer: MEDICARE

## 2017-08-01 DIAGNOSIS — E11.65 UNCONTROLLED TYPE 2 DIABETES MELLITUS WITH STAGE 3 CHRONIC KIDNEY DISEASE, UNSPECIFIED LONG TERM INSULIN USE STATUS: ICD-10-CM

## 2017-08-01 DIAGNOSIS — I10 ESSENTIAL HYPERTENSION: ICD-10-CM

## 2017-08-01 DIAGNOSIS — E11.22 UNCONTROLLED TYPE 2 DIABETES MELLITUS WITH STAGE 3 CHRONIC KIDNEY DISEASE, UNSPECIFIED LONG TERM INSULIN USE STATUS: ICD-10-CM

## 2017-08-01 DIAGNOSIS — N18.3 UNCONTROLLED TYPE 2 DIABETES MELLITUS WITH STAGE 3 CHRONIC KIDNEY DISEASE, UNSPECIFIED LONG TERM INSULIN USE STATUS: ICD-10-CM

## 2017-08-01 DIAGNOSIS — M89.9 DISORDER OF BONE: ICD-10-CM

## 2017-08-01 DIAGNOSIS — E78.5 HYPERLIPIDEMIA LDL GOAL <100: ICD-10-CM

## 2017-08-01 LAB
25(OH)D3+25(OH)D2 SERPL-MCNC: 31 NG/ML
ALBUMIN SERPL BCP-MCNC: 3.9 G/DL
ALP SERPL-CCNC: 40 U/L
ALT SERPL W/O P-5'-P-CCNC: 23 U/L
ANION GAP SERPL CALC-SCNC: 14 MMOL/L
AST SERPL-CCNC: 53 U/L
BILIRUB SERPL-MCNC: 0.6 MG/DL
BUN SERPL-MCNC: 20 MG/DL
CALCIUM SERPL-MCNC: 7.9 MG/DL
CHLORIDE SERPL-SCNC: 102 MMOL/L
CHOLEST/HDLC SERPL: 8.8 {RATIO}
CO2 SERPL-SCNC: 24 MMOL/L
CREAT SERPL-MCNC: 1.3 MG/DL
EST. GFR  (AFRICAN AMERICAN): 49.1 ML/MIN/1.73 M^2
EST. GFR  (NON AFRICAN AMERICAN): 42.6 ML/MIN/1.73 M^2
GLUCOSE SERPL-MCNC: 88 MG/DL
HDL/CHOLESTEROL RATIO: 11.4 %
HDLC SERPL-MCNC: 167 MG/DL
HDLC SERPL-MCNC: 19 MG/DL
LDLC SERPL CALC-MCNC: ABNORMAL MG/DL
NONHDLC SERPL-MCNC: 148 MG/DL
POTASSIUM SERPL-SCNC: 3.5 MMOL/L
PROT SERPL-MCNC: 7 G/DL
SODIUM SERPL-SCNC: 140 MMOL/L
TRIGL SERPL-MCNC: 724 MG/DL

## 2017-08-01 PROCEDURE — 80053 COMPREHEN METABOLIC PANEL: CPT

## 2017-08-01 PROCEDURE — 83036 HEMOGLOBIN GLYCOSYLATED A1C: CPT

## 2017-08-01 PROCEDURE — 36415 COLL VENOUS BLD VENIPUNCTURE: CPT

## 2017-08-01 PROCEDURE — 80061 LIPID PANEL: CPT

## 2017-08-01 PROCEDURE — 82306 VITAMIN D 25 HYDROXY: CPT

## 2017-08-01 RX ORDER — LANCETS
EACH MISCELLANEOUS
Qty: 200 EACH | Refills: 3 | Status: SHIPPED | OUTPATIENT
Start: 2017-08-01 | End: 2017-08-07 | Stop reason: SDUPTHER

## 2017-08-01 RX ORDER — PEN NEEDLE, DIABETIC 30 GX3/16"
NEEDLE, DISPOSABLE MISCELLANEOUS
Qty: 100 EACH | Refills: 3 | Status: SHIPPED | OUTPATIENT
Start: 2017-08-01

## 2017-08-01 NOTE — TELEPHONE ENCOUNTER
Pt would all diabetic testing supplies to be sent to [facility] mail order pharmacy in of her usual Rx

## 2017-08-02 LAB
ESTIMATED AVG GLUCOSE: 157 MG/DL
HBA1C MFR BLD HPLC: 7.1 %

## 2017-08-03 ENCOUNTER — OFFICE VISIT (OUTPATIENT)
Dept: INTERNAL MEDICINE | Facility: CLINIC | Age: 67
End: 2017-08-03
Payer: MEDICARE

## 2017-08-03 VITALS
DIASTOLIC BLOOD PRESSURE: 80 MMHG | OXYGEN SATURATION: 95 % | SYSTOLIC BLOOD PRESSURE: 150 MMHG | HEIGHT: 59 IN | WEIGHT: 156.94 LBS | TEMPERATURE: 100 F | HEART RATE: 92 BPM | BODY MASS INDEX: 31.64 KG/M2

## 2017-08-03 DIAGNOSIS — E78.1 HYPERTRIGLYCERIDEMIA: ICD-10-CM

## 2017-08-03 DIAGNOSIS — I10 HYPERTENSION GOAL BP (BLOOD PRESSURE) < 130/80: Primary | ICD-10-CM

## 2017-08-03 DIAGNOSIS — E66.9 OBESITY (BMI 30.0-34.9): ICD-10-CM

## 2017-08-03 PROCEDURE — 1126F AMNT PAIN NOTED NONE PRSNT: CPT | Mod: S$GLB,,, | Performed by: INTERNAL MEDICINE

## 2017-08-03 PROCEDURE — 1159F MED LIST DOCD IN RCRD: CPT | Mod: S$GLB,,, | Performed by: INTERNAL MEDICINE

## 2017-08-03 PROCEDURE — 3008F BODY MASS INDEX DOCD: CPT | Mod: S$GLB,,, | Performed by: INTERNAL MEDICINE

## 2017-08-03 PROCEDURE — 99499 UNLISTED E&M SERVICE: CPT | Mod: S$GLB,,, | Performed by: INTERNAL MEDICINE

## 2017-08-03 PROCEDURE — 4010F ACE/ARB THERAPY RXD/TAKEN: CPT | Mod: S$GLB,,, | Performed by: INTERNAL MEDICINE

## 2017-08-03 PROCEDURE — 99214 OFFICE O/P EST MOD 30 MIN: CPT | Mod: S$GLB,,, | Performed by: INTERNAL MEDICINE

## 2017-08-03 PROCEDURE — 99999 PR PBB SHADOW E&M-EST. PATIENT-LVL III: CPT | Mod: PBBFAC,,, | Performed by: INTERNAL MEDICINE

## 2017-08-03 PROCEDURE — 3045F PR MOST RECENT HEMOGLOBIN A1C LEVEL 7.0-9.0%: CPT | Mod: S$GLB,,, | Performed by: INTERNAL MEDICINE

## 2017-08-03 RX ORDER — DIPHENOXYLATE HYDROCHLORIDE AND ATROPINE SULFATE 2.5; .025 MG/1; MG/1
2.5 TABLET ORAL
Status: ON HOLD | COMMUNITY
Start: 2017-06-02 | End: 2019-03-19

## 2017-08-03 NOTE — PROGRESS NOTES
Subjective:       Patient ID: Luda Jc is a 66 y.o. female.    Chief Complaint: Follow-up    Luda Jc  66 y.o. White female    Patient presents with:  Follow-up    HPI: Here today to follow up on chronic conditions.  HTN--b/p elevated today. She reports compliance with chlorthalidone and lisinopril. She denies taking any OTC cold medication or decongestants. She denies an increase in salt intake. She denies symptoms.    Diabetes--improved. She has been compliant with glimepiride, metformin and Lantus. She denies dietary changes.                      HGBA1C                   7.1 (H)             08/01/2017            CKD III--stable. She is compliant with lisinopril.   HLD--she reports compliance with atorvastatin and fenofibrate. She also states she takes one fish oil capsules daily. She does not have her medications with her today. She denies diet non compliance.                        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 Virgen   Unspecified essential hypertension    Current Outpatient Prescriptions on File Prior to Visit:  atorvastatin (LIPITOR) 80 MG tablet, TAKE ONE TABLET BY MOUTH ONCE DAILY, Disp: 90 tablet, Rfl: 1  blood sugar diagnostic (CONTOUR TEST STRIPS) Strp, USE ONE STRIP TO CHECK GLUCOSE 2 TO 3 TIMES DAILY AS DIRECTED, Disp: 200 each, Rfl: 3  chlorthalidone (HYGROTEN) 50 MG Tab, Take 1 tablet (50 mg total) by mouth  "once daily., Disp: 30 tablet, Rfl: 6  cyclobenzaprine (FLEXERIL) 5 MG tablet, TAKE ONE TABLET BY MOUTH NIGHTLY AS NEEDED FOR MUSCLE SPASMS, Disp: 30 tablet, Rfl: 0  esomeprazole (NEXIUM) 20 MG capsule, Take 20 mg by mouth before breakfast., Disp: , Rfl:   fenofibrate micronized (LOFIBRA) 200 MG Cap, TAKE ONE CAPSULE BY MOUTH ONCE DAILY WITH BREAKFAST, Disp: 30 capsule, Rfl: 6  glimepiride (AMARYL) 2 MG tablet, TAKE ONE TABLET BY MOUTH ONCE DAILY WITH  BREAKFAST, Disp: 60 tablet, Rfl: 6  insulin aspart (NOVOLOG) 100 unit/mL InPn pen, Inject 10 Units into the skin 3 (three) times daily with meals., Disp: 1 Box, Rfl: 3  lancets (MICROLET LANCET) Misc, USE AS DIRECTED TWICE DAILY, Disp: 200 each, Rfl: 3  LANTUS SOLOSTAR 100 unit/mL (3 mL) InPn pen, INJECT 34 UNITS SUBCUTANEOUSLY IN THE MORNING AND 30 UNITS IN THE EVENING, Disp: 45 mL, Rfl: 1  lisinopril (PRINIVIL,ZESTRIL) 40 MG tablet, Take 1 tablet (40 mg total) by mouth once daily., Disp: 30 tablet, Rfl: 6  metformin (GLUCOPHAGE) 1000 MG tablet, Take 1 tablet (1,000 mg total) by mouth 2 (two) times daily with meals., Disp: 180 tablet, Rfl: 1  pen needle, diabetic (BD ULTRA-FINE FATEMEH PEN NEEDLES) 32 gauge x 5/32" Ndle, Use once daily. 250.00 IDDM, Disp: 100 each, Rfl: 3  tamoxifen (NOLVADEX) 20 MG Tab, , Disp: , Rfl:   triamcinolone acetonide 0.1% (KENALOG) 0.1 % cream, Apply topically 3 (three) times daily., Disp: 15 g, Rfl: 3  omega-3 fatty acids-vitamin E (FISH OIL) 1,000 mg Cap, Take 1 capsule by mouth once daily., Disp: , Rfl: 0    Allergies:  Review of patient's allergies indicates:  No Known Allergies          Review of Systems   Constitutional: Negative for fever and unexpected weight change.   Respiratory: Negative for shortness of breath.    Cardiovascular: Negative for chest pain and leg swelling.   Gastrointestinal: Negative for abdominal pain.   Genitourinary: Negative for difficulty urinating.   Musculoskeletal: Positive for arthralgias.   Neurological: " Negative for dizziness, numbness and headaches.       Objective:      Physical Exam   Constitutional: She is oriented to person, place, and time. She appears well-developed and well-nourished. No distress.   Eyes: No scleral icterus.   Cardiovascular: Normal rate, regular rhythm and normal heart sounds.    Pulmonary/Chest: Effort normal and breath sounds normal. No respiratory distress.   Abdominal: Soft. Bowel sounds are normal.   Neurological: She is alert and oriented to person, place, and time.   Skin: Skin is warm and dry.   Psychiatric: She has a normal mood and affect.   Vitals reviewed.      Assessment:       1. Hypertension goal BP (blood pressure) < 130/80    2. Uncontrolled type 2 diabetes mellitus with stage 3 chronic kidney disease, with long-term current use of insulin    3. Hypertriglyceridemia    4. Obesity (BMI 30.0-34.9)        Plan:       Luda was seen today for follow-up.    Diagnoses and all orders for this visit:    Hypertension goal BP (blood pressure) < 130/80  -     Continue chlorthalidone and lisinopril  -     Discussed lifestyle modifications    Uncontrolled type 2 diabetes mellitus with stage 3 chronic kidney disease, with long-term current use of insulin  -     Continue current management    Hypertriglyceridemia  -     Advised to double check medications once she gets home  -     Increase omega-3 fatty acids-vitamin E (FISH OIL) 1,000 mg Cap; Take two capsules daily.  -     Counseled regarding lifestyle modifications     Obesity (BMI 30.0-34.9)  -     Discussed lifestyle modifications    RTC in 2 weeks for b/p check.     Labs in 3 months.

## 2017-08-04 NOTE — TELEPHONE ENCOUNTER
----- Message from Milli Rojas sent at 8/4/2017  1:47 PM CDT -----  Contact: pt spouse-Remigio Flood requesting Rx refills for diabetic supply and  a new meter. Please adv/call [phone].    Pt use...    [facility] - [place], [place] - [address]  [address]  [facility] [address]  Phone: [phone] Fax: [phone]

## 2017-08-07 RX ORDER — LANCETS
EACH MISCELLANEOUS
Qty: 300 EACH | Refills: 3 | Status: SHIPPED | OUTPATIENT
Start: 2017-08-07 | End: 2017-08-10 | Stop reason: SDUPTHER

## 2017-08-07 RX ORDER — LANCETS 26 GAUGE
EACH MISCELLANEOUS
Qty: 1 EACH | Refills: 0 | Status: SHIPPED | OUTPATIENT
Start: 2017-08-07 | End: 2017-08-10 | Stop reason: SDUPTHER

## 2017-08-07 RX ORDER — INSULIN PUMP SYRINGE, 3 ML
EACH MISCELLANEOUS
Qty: 1 EACH | Refills: 0 | Status: SHIPPED | OUTPATIENT
Start: 2017-08-07 | End: 2017-08-10 | Stop reason: SDUPTHER

## 2017-08-14 RX ORDER — LANCETS 26 GAUGE
EACH MISCELLANEOUS
Qty: 1 EACH | Refills: 0 | Status: SHIPPED | OUTPATIENT
Start: 2017-08-14 | End: 2017-12-09 | Stop reason: SDUPTHER

## 2017-08-14 RX ORDER — INSULIN PUMP SYRINGE, 3 ML
EACH MISCELLANEOUS
Qty: 1 EACH | Refills: 0 | Status: SHIPPED | OUTPATIENT
Start: 2017-08-14

## 2017-08-14 RX ORDER — LANCETS
EACH MISCELLANEOUS
Qty: 300 EACH | Refills: 3 | Status: SHIPPED | OUTPATIENT
Start: 2017-08-14 | End: 2018-11-28 | Stop reason: SDUPTHER

## 2017-09-27 RX ORDER — FENOFIBRATE 200 MG/1
CAPSULE ORAL
Qty: 30 CAPSULE | Refills: 6 | Status: SHIPPED | OUTPATIENT
Start: 2017-09-27 | End: 2018-05-05 | Stop reason: SDUPTHER

## 2017-10-06 NOTE — TELEPHONE ENCOUNTER
----- Message from Camryn Christine sent at 10/6/2017 10:07 AM CDT -----  She's calling to get a refill...    1. What is the name of the medication you are requesting? [facility] insulin  2. What is the dose? n/a  3. How do you take the medication? Orally, topically, etc? insulin  4. How often do you take this medication? daily  5. Do you need a 30 day or 90 day supply? 30  6. How many refills are you requesting? 1  7. What is your preferred pharmacy and location of the pharmacy? [facility] in [place]  8. Who can we contact with further questions? [phone] (ivdm)

## 2017-10-09 RX ORDER — INSULIN GLARGINE 100 [IU]/ML
INJECTION, SOLUTION SUBCUTANEOUS
Qty: 45 ML | Refills: 1 | OUTPATIENT
Start: 2017-10-09

## 2017-10-09 RX ORDER — INSULIN GLARGINE 100 [IU]/ML
INJECTION, SOLUTION SUBCUTANEOUS
Qty: 1 BOX | Refills: 3 | Status: SHIPPED | OUTPATIENT
Start: 2017-10-09 | End: 2018-01-15 | Stop reason: SDUPTHER

## 2017-10-17 RX ORDER — METFORMIN HYDROCHLORIDE 1000 MG/1
TABLET ORAL
Qty: 180 TABLET | Refills: 1 | Status: SHIPPED | OUTPATIENT
Start: 2017-10-17 | End: 2018-04-24 | Stop reason: SDUPTHER

## 2017-10-30 ENCOUNTER — LAB VISIT (OUTPATIENT)
Dept: LAB | Facility: HOSPITAL | Age: 67
End: 2017-10-30
Payer: MEDICARE

## 2017-10-30 DIAGNOSIS — I10 ESSENTIAL HYPERTENSION: ICD-10-CM

## 2017-10-30 DIAGNOSIS — E78.5 HYPERLIPIDEMIA LDL GOAL <100: ICD-10-CM

## 2017-10-30 LAB
ALBUMIN SERPL BCP-MCNC: 3.5 G/DL
ALP SERPL-CCNC: 58 U/L
ALT SERPL W/O P-5'-P-CCNC: 35 U/L
ANION GAP SERPL CALC-SCNC: 14 MMOL/L
AST SERPL-CCNC: 82 U/L
BILIRUB SERPL-MCNC: 0.6 MG/DL
BUN SERPL-MCNC: 18 MG/DL
CALCIUM SERPL-MCNC: 9.4 MG/DL
CHLORIDE SERPL-SCNC: 100 MMOL/L
CHOLEST SERPL-MCNC: 164 MG/DL
CHOLEST/HDLC SERPL: 8.6 {RATIO}
CO2 SERPL-SCNC: 23 MMOL/L
CREAT SERPL-MCNC: 1.1 MG/DL
EST. GFR  (AFRICAN AMERICAN): >60 ML/MIN/1.73 M^2
EST. GFR  (NON AFRICAN AMERICAN): 52.1 ML/MIN/1.73 M^2
GLUCOSE SERPL-MCNC: 181 MG/DL
HDLC SERPL-MCNC: 19 MG/DL
HDLC SERPL: 11.6 %
LDLC SERPL CALC-MCNC: ABNORMAL MG/DL
NONHDLC SERPL-MCNC: 145 MG/DL
POTASSIUM SERPL-SCNC: 3.8 MMOL/L
PROT SERPL-MCNC: 6.6 G/DL
SODIUM SERPL-SCNC: 137 MMOL/L
TRIGL SERPL-MCNC: 669 MG/DL

## 2017-10-30 PROCEDURE — 80053 COMPREHEN METABOLIC PANEL: CPT

## 2017-10-30 PROCEDURE — 80061 LIPID PANEL: CPT

## 2017-10-30 PROCEDURE — 83036 HEMOGLOBIN GLYCOSYLATED A1C: CPT

## 2017-10-30 PROCEDURE — 36415 COLL VENOUS BLD VENIPUNCTURE: CPT

## 2017-10-31 LAB
ESTIMATED AVG GLUCOSE: 160 MG/DL
HBA1C MFR BLD HPLC: 7.2 %

## 2017-11-03 ENCOUNTER — OFFICE VISIT (OUTPATIENT)
Dept: INTERNAL MEDICINE | Facility: CLINIC | Age: 67
End: 2017-11-03
Payer: MEDICARE

## 2017-11-03 VITALS
BODY MASS INDEX: 31.25 KG/M2 | WEIGHT: 155 LBS | HEIGHT: 59 IN | OXYGEN SATURATION: 93 % | TEMPERATURE: 99 F | HEART RATE: 103 BPM | DIASTOLIC BLOOD PRESSURE: 68 MMHG | SYSTOLIC BLOOD PRESSURE: 140 MMHG

## 2017-11-03 DIAGNOSIS — R30.0 DYSURIA: ICD-10-CM

## 2017-11-03 DIAGNOSIS — E66.9 OBESITY (BMI 30.0-34.9): ICD-10-CM

## 2017-11-03 DIAGNOSIS — E78.2 HYPERLIPEMIA, MIXED: ICD-10-CM

## 2017-11-03 DIAGNOSIS — R79.89 LFT ELEVATION: ICD-10-CM

## 2017-11-03 DIAGNOSIS — M79.18 MUSCULOSKELETAL PAIN: ICD-10-CM

## 2017-11-03 DIAGNOSIS — I10 HYPERTENSION GOAL BP (BLOOD PRESSURE) < 130/80: Chronic | ICD-10-CM

## 2017-11-03 LAB
BILIRUB UR QL STRIP: NEGATIVE
CLARITY UR REFRACT.AUTO: ABNORMAL
COLOR UR AUTO: ABNORMAL
GLUCOSE UR QL STRIP: ABNORMAL
HGB UR QL STRIP: NEGATIVE
KETONES UR QL STRIP: NEGATIVE
LEUKOCYTE ESTERASE UR QL STRIP: NEGATIVE
NITRITE UR QL STRIP: NEGATIVE
PH UR STRIP: 6 [PH] (ref 5–8)
PROT UR QL STRIP: NEGATIVE
SP GR UR STRIP: 1.01 (ref 1–1.03)
URN SPEC COLLECT METH UR: ABNORMAL
UROBILINOGEN UR STRIP-ACNC: 2 EU/DL

## 2017-11-03 PROCEDURE — 99499 UNLISTED E&M SERVICE: CPT | Mod: S$GLB,,, | Performed by: INTERNAL MEDICINE

## 2017-11-03 PROCEDURE — 81003 URINALYSIS AUTO W/O SCOPE: CPT

## 2017-11-03 PROCEDURE — 99999 PR PBB SHADOW E&M-EST. PATIENT-LVL III: CPT | Mod: PBBFAC,,, | Performed by: INTERNAL MEDICINE

## 2017-11-03 PROCEDURE — 99214 OFFICE O/P EST MOD 30 MIN: CPT | Mod: S$GLB,,, | Performed by: INTERNAL MEDICINE

## 2017-11-03 RX ORDER — CYCLOBENZAPRINE HCL 5 MG
5 TABLET ORAL NIGHTLY
Qty: 30 TABLET | Refills: 0 | Status: ON HOLD | OUTPATIENT
Start: 2017-11-03 | End: 2019-03-19

## 2017-11-03 RX ORDER — LISINOPRIL 40 MG/1
40 TABLET ORAL 2 TIMES DAILY
Qty: 60 TABLET | Refills: 6 | Status: SHIPPED | OUTPATIENT
Start: 2017-11-03 | End: 2018-06-15 | Stop reason: SDUPTHER

## 2017-11-03 NOTE — PROGRESS NOTES
Subjective:       Patient ID: Luda Jc is a 66 y.o. female.    Chief Complaint: Follow-up    Luda Jc  66 y.o. White female    Patient presents with:  Follow-up    HPI: Here today to follow up on chronic conditions.  Diabetes--uncontrolled. She is taking Lantus as prescribed but only taking 8 units of Novolog TID instead of 10 units. She is compliant with glimepiride and metformin.                     HGBA1C                   7.2 (H)             10/30/2017            CKD III--stable. She denies taking NSAIDs. She is taking lisinopril as prescribed.   Microalbuminuria--worsened. She denies taking NSAIDs. She is compliant with lisinopril.   She has been having burning with urination and back pain. She has been taking AZO OTC without relief.   HTN--stable on lisinopril.   HLD--compliant with fenofibrate and atorvastatin. She is no longer taking fish oil. Her lipids are abnormal.        CHOL                     164                 10/30/2017                 HDL                      19 (L)              10/30/2017                 LDLCALC                                      10/30/2017             Invalid, Trig>400.0       TRIG                     669 (H)             10/30/2017                        ALT                      35                  10/30/2017                 AST                      82 (H)              10/30/2017                 ALKPHOS                  58                  10/30/2017                 BILITOT                  0.6                 10/30/2017                     She has been having muscular pain on her left side. The pain is in her left chest wall and extends to her left upper back. She denies trauma.     Past Medical History:  10/11/2014: Abnormal LFTs (liver function tests)  Arthritis  Diabetes mellitus   Chronic kidney disease   10/11/2014: Insulin long-term use  Obesity  Hyperlipidemia  6/16/2014: Stage II infiltrating ductal carcinoma of court*      Comment: Followed by Dr. Virgen  "  Hypertension    Current Outpatient Prescriptions on File Prior to Visit:  atorvastatin (LIPITOR) 80 MG tablet, TAKE ONE TABLET BY MOUTH ONCE DAILY, Disp: 90 tablet, Rfl: 1  blood sugar diagnostic Strp, Glucose testing three times daily., Disp: 300 strip, Rfl: 3  blood-glucose meter kit, Use as instructed, Disp: 1 each, Rfl: 0  chlorthalidone (HYGROTEN) 50 MG Tab, Take 1 tablet (50 mg total) by mouth once daily., Disp: 30 tablet, Rfl: 6  diphenoxylate-atropine 2.5-0.025 mg (LOMOTIL) 2.5-0.025 mg per tablet, Take 2.5 tablets by mouth., Disp: , Rfl:   esomeprazole (NEXIUM) 20 MG capsule, Take 20 mg by mouth before breakfast., Disp: , Rfl:   fenofibrate micronized (LOFIBRA) 200 MG Cap, TAKE ONE CAPSULE BY MOUTH ONCE DAILY WITH BREAKFAST, Disp: 30 capsule, Rfl: 6  glimepiride (AMARYL) 2 MG tablet, TAKE ONE TABLET BY MOUTH ONCE DAILY WITH  BREAKFAST, Disp: 60 tablet, Rfl: 6  insulin aspart (NOVOLOG) 100 unit/mL InPn pen, Inject 10 Units into the skin 3 (three) times daily with meals., Disp: 1 Box, Rfl: 3  lancets Misc, Glucose testing three times daily., Disp: 300 each, Rfl: 3  lancing device with lancets Kit, Use as directed, Disp: 1 each, Rfl: 0  LANTUS SOLOSTAR 100 unit/mL (3 mL) InPn pen, INJECT 34 UNITS SUBCUTANEOUSLY IN THE MORNING AND 30 UNITS IN THE EVENING, Disp: 1 Box, Rfl: 3  metformin (GLUCOPHAGE) 1000 MG tablet, TAKE ONE TABLET BY MOUTH TWICE DAILY WITH MEALS, Disp: 180 tablet, Rfl: 1  omega-3 fatty acids-vitamin E (FISH OIL) 1,000 mg Cap, Take two capsules daily., Disp: , Rfl: 0  pen needle, diabetic (BD ULTRA-FINE FATEMEH PEN NEEDLES) 32 gauge x 5/32" Ndle, Use once daily. 250.00 IDDM, Disp: 100 each, Rfl: 3  tamoxifen (NOLVADEX) 20 MG Tab, , Disp: , Rfl:   triamcinolone acetonide 0.1% (KENALOG) 0.1 % cream, Apply topically 3 (three) times daily., Disp: 15 g, Rfl: 3  lisinopril (PRINIVIL,ZESTRIL) 40 MG tablet, Take 1 tablet (40 mg total) by mouth once daily., Disp: 30 tablet, Rfl: 6    Allergies:  Review of " patient's allergies indicates:  No Known Allergies        Review of Systems   Constitutional: Negative for fever and unexpected weight change.   Respiratory: Negative for cough and shortness of breath.    Cardiovascular: Positive for chest pain. Negative for leg swelling.   Gastrointestinal: Negative for abdominal pain, constipation and diarrhea.   Genitourinary: Positive for dysuria.   Musculoskeletal: Positive for back pain. Negative for gait problem.   Neurological: Negative for dizziness and headaches.   Psychiatric/Behavioral: Positive for sleep disturbance.       Objective:      Physical Exam   Constitutional: She is oriented to person, place, and time. She appears well-developed and well-nourished. No distress.   Eyes: No scleral icterus.   Cardiovascular: Normal rate, regular rhythm and normal heart sounds.    Pulmonary/Chest: Effort normal and breath sounds normal. No respiratory distress. She has no wheezes. She has no rales. She exhibits tenderness (left upper chest without erythema or lesions).   Abdominal: Soft. Bowel sounds are normal.   Neurological: She is alert and oriented to person, place, and time.   Skin: Skin is warm and dry.   Psychiatric: She has a normal mood and affect.   Vitals reviewed.      Assessment:       1. Uncontrolled type 2 diabetes mellitus with stage 3 chronic kidney disease, with long-term current use of insulin    2. Uncontrolled type 2 diabetes mellitus with microalbuminuria, with long-term current use of insulin    3. Dysuria    4. Hypertension goal BP (blood pressure) < 130/80    5. Hyperlipemia, mixed    6. LFT elevation    7. Musculoskeletal pain    8. Obesity (BMI 30.0-34.9)        Plan:       Luda was seen today for follow-up.    Diagnoses and all orders for this visit:    Uncontrolled type 2 diabetes mellitus with stage 3 chronic kidney disease, with long-term current use of insulin  -     Microalbumin/creatinine urine ratio; Future  -     lisinopril  (PRINIVIL,ZESTRIL) 40 MG tablet; Take 1 tablet (40 mg total) by mouth 2 (two) times daily.  -     Advised to avoid NSAIDs   -     Recommend annual eye exam     Uncontrolled type 2 diabetes mellitus with microalbuminuria, with long-term current use of insulin  -     Microalbumin/creatinine urine ratio; Future  -     lisinopril (PRINIVIL,ZESTRIL) 40 MG tablet; Take 1 tablet (40 mg total) by mouth 2 (two) times daily.  -     Advised to avoid NSAIDs   -     Recommend annual eye exam     Dysuria  -     Urinalysis    Hypertension goal BP (blood pressure) < 130/80  -     Increase lisinopril (PRINIVIL,ZESTRIL) 40 MG tablet; Take 1 tablet (40 mg total) by mouth 2 (two) times daily.    Hyperlipemia, mixed  -     Continue atorvastatin and fenofibrate  -     Resume fish oil    LFT elevation  -     Monitor    Musculoskeletal pain  -     cyclobenzaprine (FLEXERIL) 5 MG tablet; Take 1 tablet (5 mg total) by mouth nightly.    Obesity (BMI 30.0-34.9)  -     Lifestyle modifications    Labs and F/U in 3 months    Advised to bring medications to the appointment

## 2017-12-11 RX ORDER — LANCING DEVICE/LANCETS
KIT MISCELLANEOUS
Qty: 1 EACH | Refills: 0 | Status: SHIPPED | OUTPATIENT
Start: 2017-12-11

## 2017-12-29 RX ORDER — ATORVASTATIN CALCIUM 80 MG/1
TABLET, FILM COATED ORAL
Qty: 90 TABLET | Refills: 1 | Status: SHIPPED | OUTPATIENT
Start: 2017-12-29 | End: 2018-07-06 | Stop reason: SDUPTHER

## 2018-01-16 RX ORDER — INSULIN GLARGINE 100 [IU]/ML
INJECTION, SOLUTION SUBCUTANEOUS
Qty: 1 BOX | Refills: 3 | Status: SHIPPED | OUTPATIENT
Start: 2018-01-16 | End: 2018-02-08 | Stop reason: SDUPTHER

## 2018-01-26 ENCOUNTER — PATIENT OUTREACH (OUTPATIENT)
Dept: ADMINISTRATIVE | Facility: HOSPITAL | Age: 68
End: 2018-01-26

## 2018-01-26 NOTE — PROGRESS NOTES
Chart audit completed.    Spoke with [facility]- Dr MARIAH Mitchell on [facility] office, pt has not been seen there since 2016. Need to sign for release for 2017 eye exam

## 2018-02-05 ENCOUNTER — LAB VISIT (OUTPATIENT)
Dept: LAB | Facility: HOSPITAL | Age: 68
End: 2018-02-05
Attending: INTERNAL MEDICINE
Payer: MEDICARE

## 2018-02-05 LAB
CREAT UR-MCNC: 104 MG/DL
MICROALBUMIN UR DL<=1MG/L-MCNC: 198 UG/ML
MICROALBUMIN/CREATININE RATIO: 190.4 UG/MG

## 2018-02-05 PROCEDURE — 82043 UR ALBUMIN QUANTITATIVE: CPT

## 2018-02-08 ENCOUNTER — OFFICE VISIT (OUTPATIENT)
Dept: INTERNAL MEDICINE | Facility: CLINIC | Age: 68
End: 2018-02-08
Payer: MEDICARE

## 2018-02-08 VITALS
HEIGHT: 59 IN | HEART RATE: 102 BPM | SYSTOLIC BLOOD PRESSURE: 158 MMHG | DIASTOLIC BLOOD PRESSURE: 72 MMHG | WEIGHT: 153.69 LBS | BODY MASS INDEX: 30.98 KG/M2 | OXYGEN SATURATION: 97 % | TEMPERATURE: 98 F

## 2018-02-08 DIAGNOSIS — E11.29 DIABETES MELLITUS WITH MICROALBUMINURIA: ICD-10-CM

## 2018-02-08 DIAGNOSIS — N18.30 TYPE 2 DIABETES MELLITUS WITH STAGE 3 CHRONIC KIDNEY DISEASE, WITH LONG-TERM CURRENT USE OF INSULIN: ICD-10-CM

## 2018-02-08 DIAGNOSIS — E11.59 HYPERTENSION ASSOCIATED WITH DIABETES: Primary | ICD-10-CM

## 2018-02-08 DIAGNOSIS — I15.2 HYPERTENSION ASSOCIATED WITH DIABETES: Primary | ICD-10-CM

## 2018-02-08 DIAGNOSIS — E11.22 TYPE 2 DIABETES MELLITUS WITH STAGE 3 CHRONIC KIDNEY DISEASE, WITH LONG-TERM CURRENT USE OF INSULIN: ICD-10-CM

## 2018-02-08 DIAGNOSIS — Z79.4 TYPE 2 DIABETES MELLITUS WITH STAGE 3 CHRONIC KIDNEY DISEASE, WITH LONG-TERM CURRENT USE OF INSULIN: ICD-10-CM

## 2018-02-08 DIAGNOSIS — E11.69 COMBINED HYPERLIPIDEMIA ASSOCIATED WITH TYPE 2 DIABETES MELLITUS: ICD-10-CM

## 2018-02-08 DIAGNOSIS — E78.2 COMBINED HYPERLIPIDEMIA ASSOCIATED WITH TYPE 2 DIABETES MELLITUS: ICD-10-CM

## 2018-02-08 DIAGNOSIS — R80.9 DIABETES MELLITUS WITH MICROALBUMINURIA: ICD-10-CM

## 2018-02-08 PROCEDURE — 1126F AMNT PAIN NOTED NONE PRSNT: CPT | Mod: S$GLB,,, | Performed by: INTERNAL MEDICINE

## 2018-02-08 PROCEDURE — 99999 PR PBB SHADOW E&M-EST. PATIENT-LVL III: CPT | Mod: PBBFAC,,, | Performed by: INTERNAL MEDICINE

## 2018-02-08 PROCEDURE — 99214 OFFICE O/P EST MOD 30 MIN: CPT | Mod: S$GLB,,, | Performed by: INTERNAL MEDICINE

## 2018-02-08 PROCEDURE — 1159F MED LIST DOCD IN RCRD: CPT | Mod: S$GLB,,, | Performed by: INTERNAL MEDICINE

## 2018-02-08 PROCEDURE — 3008F BODY MASS INDEX DOCD: CPT | Mod: S$GLB,,, | Performed by: INTERNAL MEDICINE

## 2018-02-08 PROCEDURE — 99499 UNLISTED E&M SERVICE: CPT | Mod: S$GLB,,, | Performed by: INTERNAL MEDICINE

## 2018-02-08 RX ORDER — INSULIN GLARGINE 100 [IU]/ML
INJECTION, SOLUTION SUBCUTANEOUS
Qty: 2 BOX | Refills: 6 | Status: SHIPPED | OUTPATIENT
Start: 2018-02-08 | End: 2019-03-07 | Stop reason: SDUPTHER

## 2018-02-08 RX ORDER — AMLODIPINE BESYLATE 5 MG/1
5 TABLET ORAL DAILY
Qty: 30 TABLET | Refills: 1 | Status: SHIPPED | OUTPATIENT
Start: 2018-02-08 | End: 2018-02-22

## 2018-02-08 NOTE — PATIENT INSTRUCTIONS
Uncontrolled High Blood Pressure (Established)    Your blood pressure was unusually high today. This can occur if youve missed doses of your blood pressure medicine. Or it can happen if you are taking other medicines. These include some asthma inhalers, decongestants, diet pills, and street drugs like cocaine and amphetamine.  Other causes include:  · Weight gain  · More salt in your diet  · Smoking  · Caffeine  Your blood pressure can also rise if you are emotionally upset or in intense pain. It may go back to normal after a period of rest.  A blood pressure reading is made up of 2 numbers. There is a top number over a bottom number. The top number is the systolic pressure. The bottom number is the diastolic pressure. A normal blood pressure is a systolic pressure of less than 120 over a diastolic pressure of less than 80. High blood pressure (hypertension) is when the top number is 140 or higher. Or it is when the bottom number is 90 or higher. You will see your blood pressure readings written together. For example, a person with a systolic pressure of 118 and a diastolic pressure of 78 will have 118/78 written in the medical record. To be high blood pressure, the numbers must be higher when tested over a period of time. The blood pressures between normal and hypertension are called prehypertension. Prehypertension is a warning sign. The information gives you a chance to make lifestyle changes (weight loss, more exercise) that can keep your blood pressure from going higher.  Home care  Its important to take steps to lower your blood pressure. If you are taking blood pressure medicine, the guidelines below may help you need less or no medicines in the future.  · Begin a weight-loss program if you are overweight.  · Cut back on the amount of salt in your diet:  ¨ Avoid high-salt foods like olives, pickles, smoked meats, and salted potato chips.  ¨ Dont add salt to your food at the table.  ¨ Use only small  amounts of salt when cooking.  · Begin an exercise program. Talk with your health care provider about what exercise program is best for you. It doesnt have to be difficult. Even brisk walking for 20 minutes 3 times a week is a good form of exercise.  · Avoid medicines that stimulates the heart. This includes many over-the-counter cold and sinus decongestant pills and sprays, as well as diet pills. Check the warnings about hypertension on the label. Before purchasing any over-the-counter medicines or supplements, always ask the pharmacist about the product's potential interaction with your high blood pressure and your medicines.  · Stimulants such as amphetamine or cocaine could be lethal for someone with hypertension. Never take these.  · Limit how much caffeine you drink. Or switch to noncaffeinated beverages.  · Stop smoking. If you are a long-time smoker, this can be hard. Enroll in a stop-smoking program to make it more likely that you will succeed. Talk with your provider about ways to quit.  · Learn how to handle stress better. This is an important part of any program to lower blood pressure. Learn ways to relax. These include meditation, yoga, and biofeedback.  · If medicines were prescribed, take them exactly as directed. Missing doses may cause your blood pressure to get out of control.  · If you miss a dose or doses of your medicines, check with your healthcare provider or pharmacist about what to do.  · Consider buying an automatic blood pressure machine. Your provider may recommend a certain type. You can get one of these at most pharmacies. Measure your blood pressure twice a day, in the morning, and in the late afternoon. Keep a written record of your home blood pressure readings and take the record to your medical appointments.  Here are some additional guidelines on home blood pressure monitoring from the [employer].  · Don't smoke or drink coffee for 30 minutes  · Go to the bathroom  before the test.  · Relax for 5 minutes before taking the measurement.  · Sit correctly. Be sure your back is supported. Don't sit on a couch or soft chair. Uncross your feet and place them flat on the floor. Place your arm on a solid, flat surface like a table with the upper arm at heart level. Make certain the middle of the cuff is directly above the eye of the elbow. Check the monitor's instruction manual for an illustration.  · Take multiple readings. When you measure, take 2 or 3 readings one minute apart and record all of the results.  · Take your blood pressure at the same time every day, or as your healthcare provider recommends.  · Record the date, time, and blood pressure reading.  · Take the record with you to your next appointment. If your blood pressure monitor has a built-in memory, simply take the monitor with you to your next appointment.  · Call your provider if you have several high readings. Don't be frightened by a single high reading, but if you get several high readings, check in with your healthcare provider.  · Note: When blood pressure reaches a systolic (top number) of 180 or higher or a diastolic (bottom number) of 110 or higher, emergency medical treatment is required. Call your healthcare provider immediately.  Follow-up care  Regular visits to your own healthcare provider for blood pressure and medicine checks are an important part of your care. Make a follow-up appointment as directed. Bring the record of your home blood pressure readings to the appointment.  When to seek medical advice  Call your healthcare provider right away if any of these occur:  · Blood pressure reaches a systolic (top number) of 180 or higher or diastolic (bottom number) of 110 or higher, emergency medical treatment is required.  · Chest, arm, shoulder, neck, or upper back pain  · Shortness of breath  · Severe headache  · Throbbing or rushing sound in the ears  · Nosebleed  · Extreme drowsiness, confusion, or  fainting  · Dizziness or dizziness with spinning sensation (vertigo)  · Weakness in an arm or leg or on one side of the face  · Trouble speaking or seeing   Date Last Reviewed: 1/1/2017 © 2000-2017 [employer]. [address] [place] [address], [place] [address]. All rights reserved. This information is not intended as a substitute for professional medical care. Always follow your healthcare professional's instructions.        Amlodipine tablets  What is this medicine?  AMLODIPINE (am FRANCHESCA di pejossy) is a calcium-channel blocker. It affects the amount of calcium found in your heart and muscle cells. This relaxes your blood vessels, which can reduce the amount of work the heart has to do. This medicine is used to lower high blood pressure. It is also used to prevent chest pain.  How should I use this medicine?  Take this medicine by mouth with a glass of water. Follow the directions on the prescription label. Take your medicine at regular intervals. Do not take more medicine than directed.  Talk to your pediatrician regarding the use of this medicine in children. Special care may be needed. This medicine has been used in children as young as 6.  Persons over 65 years old may have a stronger reaction to this medicine and need smaller doses.  What side effects may I notice from receiving this medicine?  Side effects that you should report to your doctor or health care professional as soon as possible:  · allergic reactions like skin rash, itching or hives, swelling of the face, lips, or tongue  · breathing problems  · changes in vision or hearing  · chest pain  · fast, irregular heartbeat  · swelling of legs or ankles  Side effects that usually do not require medical attention (report to your doctor or health care professional if they continue or are bothersome):  · dry mouth  · facial flushing  · nausea, vomiting  · stomach gas, pain  · tired, weak  · trouble sleeping  What may interact with this medicine?  · herbal  or dietary supplements  · local or general anesthetics  · medicines for high blood pressure  · medicines for prostate problems  · rifampin  What if I miss a dose?  If you miss a dose, take it as soon as you can. If it is almost time for your next dose, take only that dose. Do not take double or extra doses.  Where should I keep my medicine?  Keep out of the reach of children.  Store at room temperature between 59 and 86 degrees F (15 and 30 degrees C). Protect from light. Keep container tightly closed. Throw away any unused medicine after the expiration date.  What should I tell my health care provider before I take this medicine?  They need to know if you have any of these conditions:  · heart problems like heart failure or aortic stenosis  · liver disease  · an unusual or allergic reaction to amlodipine, other medicines, foods, dyes, or preservatives  · pregnant or trying to get pregnant  · breast-feeding  What should I watch for while using this medicine?  Visit your doctor or health care professional for regular check ups. Check your blood pressure and pulse rate regularly. Ask your health care professional what your blood pressure and pulse rate should be, and when you should contact him or her.  This medicine may make you feel confused, dizzy or lightheaded. Do not drive, use machinery, or do anything that needs mental alertness until you know how this medicine affects you. To reduce the risk of dizzy or fainting spells, do not sit or stand up quickly, especially if you are an older patient. Avoid alcoholic drinks; they can make you more dizzy.  Do not suddenly stop taking amlodipine. Ask your doctor or health care professional how you can gradually reduce the dose.  NOTE:This sheet is a summary. It may not cover all possible information. If you have questions about this medicine, talk to your doctor, pharmacist, or health care provider. Copyright© 2017 [employer]

## 2018-02-09 ENCOUNTER — TELEPHONE (OUTPATIENT)
Dept: INTERNAL MEDICINE | Facility: CLINIC | Age: 68
End: 2018-02-09

## 2018-02-09 DIAGNOSIS — E11.59 HYPERTENSION ASSOCIATED WITH DIABETES: ICD-10-CM

## 2018-02-09 DIAGNOSIS — E78.2 COMBINED HYPERLIPIDEMIA ASSOCIATED WITH TYPE 2 DIABETES MELLITUS: ICD-10-CM

## 2018-02-09 DIAGNOSIS — R80.9 DIABETES MELLITUS WITH MICROALBUMINURIA: Primary | ICD-10-CM

## 2018-02-09 DIAGNOSIS — E11.29 DIABETES MELLITUS WITH MICROALBUMINURIA: Primary | ICD-10-CM

## 2018-02-09 DIAGNOSIS — I15.2 HYPERTENSION ASSOCIATED WITH DIABETES: ICD-10-CM

## 2018-02-09 DIAGNOSIS — E11.69 COMBINED HYPERLIPIDEMIA ASSOCIATED WITH TYPE 2 DIABETES MELLITUS: ICD-10-CM

## 2018-02-09 NOTE — PROGRESS NOTES
Subjective:       Patient ID: Luda Jc is a 67 y.o. female.    Chief Complaint: Follow-up    Luda Jc  67 y.o. White female    Patient presents with:  Follow-up    HPI: Here today to follow up on chronic conditions.  HTN--uncontrolled. She is taking lisinopril and chlorthalidone as prescribed. She denies symptoms.   Diabetes--improved. She has been compliant with glimepiride, Novolog and Lantus. She monitors her glucoses at home.                      HGBA1C                   6.6 (H)             02/05/2018            CKD III--she is compliant with lisinopril. Her microalbuminuria has improved slightly (decreased to 190 from 235). She admits to taking NSAID's on occasion.   HLD--compliant with atorvastatin and fenofibrate.                      CHOL                     136                 02/05/2018                 HDL                      25 (L)              02/05/2018                LDLCALC                  66.2                02/05/2018                 TRIG                     224 (H)             02/05/2018                  Past Medical History:  10/11/2014: Abnormal LFTs (liver function tests)  Arthritis  Chronic kidney disease  Diabetes mellitus, type 2  Hyperlipidemia  Hypertension  10/11/2014: Insulin long-term use  Obesity  6/16/2014: Stage II infiltrating ductal carcinoma of court*      Comment: Followed by Dr. Virgen     Current Outpatient Prescriptions:     ACCU-CHEK SOFT DEV LANCETS Kit, USE AS DIRECTED, Disp: 1 each, Rfl: 0    atorvastatin (LIPITOR) 80 MG tablet, TAKE ONE TABLET BY MOUTH ONCE DAILY, Disp: 90 tablet, Rfl: 1    blood sugar diagnostic Strp, Glucose testing three times daily., Disp: 300 strip, Rfl: 3    blood-glucose meter kit, Use as instructed, Disp: 1 each, Rfl: 0    chlorthalidone (HYGROTEN) 50 MG Tab, Take 1 tablet (50 mg total) by mouth once daily., Disp: 30 tablet, Rfl: 6    cyclobenzaprine (FLEXERIL) 5 MG tablet, Take 1 tablet (5 mg total) by mouth nightly., Disp: 30  "tablet, Rfl: 0    diphenoxylate-atropine 2.5-0.025 mg (LOMOTIL) 2.5-0.025 mg per tablet, Take 2.5 tablets by mouth., Disp: , Rfl:     esomeprazole (NEXIUM) 20 MG capsule, Take 20 mg by mouth before breakfast., Disp: , Rfl:     fenofibrate micronized (LOFIBRA) 200 MG Cap, TAKE ONE CAPSULE BY MOUTH ONCE DAILY WITH BREAKFAST, Disp: 30 capsule, Rfl: 6    glimepiride (AMARYL) 2 MG tablet, TAKE ONE TABLET BY MOUTH ONCE DAILY WITH  BREAKFAST, Disp: 60 tablet, Rfl: 6    insulin aspart (NOVOLOG) 100 unit/mL InPn pen, Inject 10 Units into the skin 3 (three) times daily with meals., Disp: 1 Box, Rfl: 3    insulin glargine (LANTUS SOLOSTAR) 100 unit/mL (3 mL) InPn pen, INJECT 34 UNITS SUBCUTANEOUSLY IN THE MORNING AND 30 IN THE EVENING, Disp: 2 Box, Rfl: 6    lancets Misc, Glucose testing three times daily., Disp: 300 each, Rfl: 3    lisinopril (PRINIVIL,ZESTRIL) 40 MG tablet, Take 1 tablet (40 mg total) by mouth 2 (two) times daily., Disp: 60 tablet, Rfl: 6    metformin (GLUCOPHAGE) 1000 MG tablet, TAKE ONE TABLET BY MOUTH TWICE DAILY WITH MEALS, Disp: 180 tablet, Rfl: 1    omega-3 fatty acids-vitamin E (FISH OIL) 1,000 mg Cap, Take two capsules daily., Disp: , Rfl: 0    pen needle, diabetic (BD ULTRA-FINE FATEMEH PEN NEEDLES) 32 gauge x 5/32" Ndle, Use once daily. 250.00 IDDM, Disp: 100 each, Rfl: 3    tamoxifen (NOLVADEX) 20 MG Tab, , Disp: , Rfl:     triamcinolone acetonide 0.1% (KENALOG) 0.1 % cream, Apply topically 3 (three) times daily., Disp: 15 g, Rfl: 3    Allergies:  Review of patient's allergies indicates:  No Known Allergies        Review of Systems   Constitutional: Negative for fever and unexpected weight change.   Eyes: Negative for visual disturbance.   Respiratory: Negative for cough and shortness of breath.    Cardiovascular: Negative for chest pain and leg swelling.   Gastrointestinal: Negative for abdominal pain, constipation and diarrhea.   Genitourinary: Negative for decreased urine volume, " difficulty urinating and dysuria.   Musculoskeletal: Positive for back pain. Negative for gait problem.   Neurological: Negative for dizziness, numbness and headaches.       Objective:      Physical Exam   Constitutional: She is oriented to person, place, and time. She appears well-developed and well-nourished. No distress.   Eyes: No scleral icterus.   Cardiovascular: Normal rate, regular rhythm, normal heart sounds and intact distal pulses.    Pulses:       Dorsalis pedis pulses are 2+ on the right side, and 2+ on the left side.   Pulmonary/Chest: Effort normal and breath sounds normal. No respiratory distress.   Abdominal: Soft. Bowel sounds are normal.   Musculoskeletal: She exhibits no edema.   Feet:   Right Foot:   Protective Sensation: 4 sites tested. 4 sites sensed.   Skin Integrity: Positive for callus. Negative for ulcer.   Left Foot:   Protective Sensation: 4 sites tested. 4 sites sensed.   Skin Integrity: Positive for callus. Negative for ulcer.   Neurological: She is alert and oriented to person, place, and time.   Skin: Skin is warm and dry.   Psychiatric: She has a normal mood and affect.   Vitals reviewed.      Assessment:       1. Hypertension associated with diabetes    2. Type 2 diabetes mellitus with stage 3 chronic kidney disease, with long-term current use of insulin    3. Diabetes mellitus with microalbuminuria    4. Combined hyperlipidemia associated with type 2 diabetes mellitus        Plan:       Luda was seen today for follow-up.    Diagnoses and all orders for this visit:    Hypertension associated with diabetes  -     Add amLODIPine (NORVASC) 5 MG tablet; Take 1 tablet (5 mg total) by mouth once daily.  -     Continue lisinopril and chlorthalidone   -     Refill insulin glargine (LANTUS SOLOSTAR) 100 unit/mL (3 mL) InPn pen; INJECT 34 UNITS SUBCUTANEOUSLY IN THE MORNING AND 30 IN THE EVENING  -     Continue Novolog and glimepiride  -     Continue home glucose monitoring     Type 2  diabetes mellitus with stage 3 chronic kidney disease, with long-term current use of insulin  -     Refill nsulin glargine (LANTUS SOLOSTAR) 100 unit/mL (3 mL) InPn pen; INJECT 34 UNITS SUBCUTANEOUSLY IN THE MORNING AND 30 IN THE EVENING  -     Advised to avoid NSAID's  -     Continue lisinopril     Diabetes mellitus with microalbuminuria  -     Advised to avoid NSAID's  -     Continue lisinopril  -     insulin glargine (LANTUS SOLOSTAR) 100 unit/mL (3 mL) InPn pen; INJECT 34 UNITS SUBCUTANEOUSLY IN THE MORNING AND 30 IN THE EVENING    Combined hyperlipidemia associated with type 2 diabetes mellitus  -     Continue atorvastatin and fenofibrate  -     insulin glargine (LANTUS SOLOSTAR) 100 unit/mL (3 mL) InPn pen; INJECT 34 UNITS SUBCUTANEOUSLY IN THE MORNING AND 30 IN THE EVENING    Recommend annual diabetic eye exam.     RTC in 2 weeks for b/p recheck.     Labs in 3 months

## 2018-02-09 NOTE — TELEPHONE ENCOUNTER
----- Message from Leanne Bonilla DO sent at 2/9/2018  8:29 AM CST -----  Will do  ----- Message -----  From: Lena Colindres MA  Sent: 2/8/2018  12:22 PM  To: Leanne Bonilla DO    Please place lab orders

## 2018-02-13 ENCOUNTER — PATIENT OUTREACH (OUTPATIENT)
Dept: ADMINISTRATIVE | Facility: HOSPITAL | Age: 68
End: 2018-02-13

## 2018-02-13 NOTE — PROGRESS NOTES
Attempted to schedule diabetic eye exam and zoster vaccination. Patient not available, left voicemail.

## 2018-02-15 NOTE — PROGRESS NOTES
Return call to patient for diabetic eye examination appointment. Patient not available, left message.

## 2018-02-20 ENCOUNTER — PES CALL (OUTPATIENT)
Dept: ADMINISTRATIVE | Facility: CLINIC | Age: 68
End: 2018-02-20

## 2018-02-20 PROBLEM — M85.80 OSTEOPENIA WITH HIGH RISK OF FRACTURE: Status: ACTIVE | Noted: 2018-02-20

## 2018-02-20 PROBLEM — I70.0 ATHEROSCLEROSIS OF AORTA: Status: ACTIVE | Noted: 2018-02-20

## 2018-02-20 PROBLEM — E66.9 OBESITY (BMI 30.0-34.9): Status: ACTIVE | Noted: 2018-02-20

## 2018-02-22 ENCOUNTER — CLINICAL SUPPORT (OUTPATIENT)
Dept: INTERNAL MEDICINE | Facility: CLINIC | Age: 68
End: 2018-02-22
Payer: MEDICARE

## 2018-02-22 ENCOUNTER — OFFICE VISIT (OUTPATIENT)
Dept: INTERNAL MEDICINE | Facility: CLINIC | Age: 68
End: 2018-02-22
Payer: MEDICARE

## 2018-02-22 VITALS
DIASTOLIC BLOOD PRESSURE: 66 MMHG | HEART RATE: 99 BPM | OXYGEN SATURATION: 98 % | SYSTOLIC BLOOD PRESSURE: 148 MMHG | BODY MASS INDEX: 30.62 KG/M2 | DIASTOLIC BLOOD PRESSURE: 72 MMHG | HEIGHT: 59 IN | SYSTOLIC BLOOD PRESSURE: 146 MMHG | WEIGHT: 151.88 LBS

## 2018-02-22 DIAGNOSIS — N18.30 DIABETES MELLITUS WITH STAGE 3 CHRONIC KIDNEY DISEASE: ICD-10-CM

## 2018-02-22 DIAGNOSIS — M85.80 OSTEOPENIA WITH HIGH RISK OF FRACTURE: ICD-10-CM

## 2018-02-22 DIAGNOSIS — G47.9 TROUBLE IN SLEEPING: ICD-10-CM

## 2018-02-22 DIAGNOSIS — E78.2 HYPERLIPIDEMIA, MIXED: Chronic | ICD-10-CM

## 2018-02-22 DIAGNOSIS — D64.9 ANEMIA, UNSPECIFIED TYPE: ICD-10-CM

## 2018-02-22 DIAGNOSIS — I10 ESSENTIAL HYPERTENSION: ICD-10-CM

## 2018-02-22 DIAGNOSIS — E11.22 DIABETES MELLITUS WITH STAGE 3 CHRONIC KIDNEY DISEASE: ICD-10-CM

## 2018-02-22 DIAGNOSIS — I70.0 ATHEROSCLEROSIS OF AORTA: ICD-10-CM

## 2018-02-22 DIAGNOSIS — E66.9 OBESITY (BMI 30.0-34.9): ICD-10-CM

## 2018-02-22 DIAGNOSIS — C50.911 MALIGNANT NEOPLASM OF RIGHT FEMALE BREAST, UNSPECIFIED ESTROGEN RECEPTOR STATUS, UNSPECIFIED SITE OF BREAST: ICD-10-CM

## 2018-02-22 DIAGNOSIS — Z00.00 ENCOUNTER FOR PREVENTIVE HEALTH EXAMINATION: Primary | ICD-10-CM

## 2018-02-22 DIAGNOSIS — R74.01 ELEVATED AST (SGOT): ICD-10-CM

## 2018-02-22 PROCEDURE — 99999 PR PBB SHADOW E&M-EST. PATIENT-LVL V: CPT | Mod: PBBFAC,,, | Performed by: NURSE PRACTITIONER

## 2018-02-22 PROCEDURE — G0439 PPPS, SUBSEQ VISIT: HCPCS | Mod: S$GLB,,, | Performed by: NURSE PRACTITIONER

## 2018-02-22 PROCEDURE — 99499 UNLISTED E&M SERVICE: CPT | Mod: S$GLB,,, | Performed by: NURSE PRACTITIONER

## 2018-02-22 PROCEDURE — 99999 PR PBB SHADOW E&M-EST. PATIENT-LVL I: CPT | Mod: PBBFAC,,,

## 2018-02-22 RX ORDER — HYDROCODONE BITARTRATE AND ACETAMINOPHEN 10; 325 MG/1; MG/1
TABLET ORAL
Status: ON HOLD | COMMUNITY
Start: 2017-11-06 | End: 2018-10-09 | Stop reason: CLARIF

## 2018-02-22 RX ORDER — ASPIRIN 81 MG/1
81 TABLET ORAL DAILY
Status: ON HOLD | COMMUNITY
End: 2018-10-09 | Stop reason: HOSPADM

## 2018-02-22 RX ORDER — GLIMEPIRIDE 2 MG/1
TABLET ORAL
Qty: 60 TABLET | Refills: 6 | Status: ON HOLD | OUTPATIENT
Start: 2018-02-22 | End: 2019-03-20

## 2018-02-22 RX ORDER — AMLODIPINE BESYLATE 10 MG/1
10 TABLET ORAL DAILY
Qty: 30 TABLET | Refills: 1 | Status: SHIPPED | OUTPATIENT
Start: 2018-02-22 | End: 2018-04-09 | Stop reason: SDUPTHER

## 2018-02-22 NOTE — Clinical Note
Your patient was seen today for a HRA visit. Abnormalities (BOLDED) have been identified during this visit that may require additional testing and  follow up. I have included a copy of my visit note, please review the note and feel free to contact me with any questions.  Thank you for allowing me to participate in the care of your patients.  Demetrice Elizabeth NP

## 2018-02-22 NOTE — PROGRESS NOTES
"Luda Jc presented for a  Medicare AWV and comprehensive Health Risk Assessment today. The following components were reviewed and updated:    · Medical history  · Family History  · Social history  · Allergies and Current Medications  · Health Risk Assessment  · Health Maintenance  · Care Team     ** See Completed Assessments for Annual Wellness Visit within the encounter summary.**       The following assessments were completed:  · Living Situation  · CAGE  · Depression Screening  · Timed Get Up and Go  · Whisper Test  · Cognitive Function Screening  · Nutrition Screening  · ADL Screening  · PAQ Screening    Vitals:    02/22/18 1157   BP: (!) 146/72   BP Location: Left arm   Patient Position: Sitting   BP Method: Medium (Manual)   Pulse: 99   SpO2: 98%   Weight: 68.9 kg (151 lb 14.4 oz)   Height: 4' 10.5" (1.486 m)     Body mass index is 31.21 kg/m².  Physical Exam   Constitutional: She is oriented to person, place, and time. She appears well-developed and well-nourished.   HENT:   Head: Normocephalic.   Cardiovascular: Normal rate, regular rhythm and normal heart sounds.    No murmur heard.  Pulmonary/Chest: Effort normal and breath sounds normal. No respiratory distress.   Abdominal: Soft. She exhibits no mass. There is no tenderness.   Musculoskeletal: Normal range of motion. She exhibits no edema.   Neurological: She is alert and oriented to person, place, and time. She exhibits normal muscle tone.   Skin: Skin is warm, dry and intact.   Psychiatric: She has a normal mood and affect. Her speech is normal and behavior is normal.   Nursing note and vitals reviewed.        Diagnoses and health risks identified today and associated recommendations/orders:    1. Encounter for preventive health examination  Recommended to get outside records sent to PCP to be scanned into chart. She expressed understanding.     Reports she sees an outside pain management doctor but can not remember his name at this time. Reports " her back pain is not new and stable.     2. Diabetes mellitus with stage 3 chronic kidney disease  DM- On Insulin. A1C 6.6  Encouraged daily foot inspections.   Encouraged yearly eye exams.  She sees outside eye doctor, Dr. Mitchell. Reports she is due for annual eye exam. Advised to contact eye doctor to schedule. She expressed understanding.   Stable and controlled. Continue current treatment plan as previously prescribed with your PCP.   CKD 3 (also with microalbuminuria)-Microalbumin/creatinine urine ratio 2/5/2018 (improved from prior)  Discussed with patient. Advised to avoid NSAIDs.  Stable. Continue current treatment plan as previously prescribed with your PCP.     3. Essential hypertension  Had nurse visit for BP check prior to HRA. Primary care adjusted BP medications. She has another nurse visit in 2 weeks to reassess.   Discussed s/s of MI and stroke (patient denies any s/s at this time) and advised to go to the ER if occur. She expressed understanding.   Not at goal. Continue current treatment plan as previously prescribed with your PCP.     4. Hyperlipidemia, mixed  Triglycerides elevated but improved from prior check. Continue current treatment plan as previously prescribed with your PCP.     5. Atherosclerosis of aorta  CT Abdomen/pelvis 6/24/2015---Abdominal Aorta: Atherosclerotic aorta and iliac arteries.  Discussed diagnosis and risk reduction. She expressed understanding.     Advised to follow up with PCP for further recommendations. She expressed understanding.      6. Anemia, unspecified type  No recent check.   Denies hemoptysis, hematemesis, hematuria, hematochezia, or melena.   Advised to follow up with PCP for further evaluation and recommendations. She expressed understanding.      7. Malignant neoplasm of right female breast, unspecified estrogen receptor status, unspecified site of breast  Per patient s/p (2014) right lumpectomy, chemo, and radiation.   On Tamoxifen.   Sees outside  doctors, Dr. Virgen and Dr. Salinas  Continue current treatment plan as previously prescribed with Dr. Virgen and Dr. Salinas.      8. Elevated AST (SGOT)  AST 61H, decreased from prior check.   CT Abdomen/pelvis 6/24/2015---Fatty infiltration of the liver.  Has seen gastroenterologist, Dr. Hodges, in past.   Recommended a healthy diet, exercise, diabetes control, and weight loss. She expressed understanding.     Continue current treatment plan as previously prescribed with your gastroenterologist, Dr. Hodges.   Advised to follow up with PCP for further monitoring. She expressed understanding.       9. Osteopenia with high risk of fracture  DEXA 4/24/2017  CT Abdomen/pelvis 6/24/2015---Fracture of the right L3 transverse process.  Per patient on Prolia injections q 6 months per Dr. Virgen.   Continue current treatment plan as previously prescribed with Dr. Virgen.     10. Obesity (BMI 30.0-34.9)  Encouraged healthy diet and exercise as tolerated to help bring BMI into normal range.   Continue current treatment plan as previously prescribed with your PCP.     11. Trouble in sleeping  Advised to follow up with PCP for further evaluation and treatment. She expressed understanding.        Education, counseling, and referrals were provided as needed. After Visit Summary printed and given to patient which includes a list of additional screenings\tests needed.    Follow-up in about 1 year (around 2/22/2019) for AWV/HRA.    Demetrice Elizabeth NP

## 2018-02-22 NOTE — PATIENT INSTRUCTIONS
Counseling and Referral of Other Preventative  (Italic type indicates deductible and co-insurance are waived)    Patient Name: Luda Jc  Today's Date: 2/22/2018    Health Maintenance       Date Due Completion Date    Zoster Vaccine 12/17/2009 ---    Eye Exam 01/23/2018 1/23/2017    Override on 4/26/2016: Done (Negstive Retinopathy-see scanned report -[facility] [place])    Override on 10/14/2014: Declined (pt. states is seeing outside opthalmology)    Hemoglobin A1c 08/05/2018 2/5/2018    Lipid Panel 02/05/2019 2/5/2018    Foot Exam 02/08/2019 2/8/2018    Override on 12/5/2016: Done    Override on 12/10/2015: Done    Override on 10/30/2013: Done    DEXA SCAN 04/24/2020 4/24/2017    Override on 4/24/2017: Done    Colonoscopy 03/24/2026 3/24/2016    Override on 9/14/2011: Declined    TETANUS VACCINE 04/28/2027 4/28/2017 (Not Clinical)    Override on 4/28/2017: Not Clinically Appropriate        No orders of the defined types were placed in this encounter.    The following information is provided to all patients.  This information is to help you find resources for any of the problems found today that may be affecting your health:                Living healthy guide: www.[facility].[place].gov      Understanding Diabetes: www.diabetes.org      Eating healthy: www.cdc.gov/healthyweight      CDC home safety checklist: www.cdc.gov/steadi/patient.html      Agency on Aging: www.goea.[place].gov      Alcoholics anonymous (AA): www.aa.org      Physical Activity: www.feroz.nih.gov/fm5hmvt      Tobacco use: www.quitwithusla.org

## 2018-02-22 NOTE — PROGRESS NOTES
Patient came in for a BP check.  First BP reading was 152/66 in the right arm using the small manual cuff.  Patient's second BP reading was 148/66 in the right arm using the small manual cuff.  Patient denies and HBP symptoms and states that she is in compliance with her medications and she feels fine.  Consulted with Dr. Gavin to be advised.

## 2018-02-22 NOTE — PROGRESS NOTES
Increased amlodipine today from 5 mg to 10 mg.  Return in 2 weeks for nurse visit to recheck.  Return sooner if lightheaded, fatigue or dizziness occurs so we can make sure the bottom number isn't too low.

## 2018-02-23 ENCOUNTER — TELEPHONE (OUTPATIENT)
Dept: INTERNAL MEDICINE | Facility: CLINIC | Age: 68
End: 2018-02-23

## 2018-02-23 NOTE — TELEPHONE ENCOUNTER
----- Message from Teressa Rojas sent at 2/23/2018  8:54 AM CST -----  Contact: pt  The pt request a call to reschedule her 3/8 appt, the pt can be reached at [phone]///thxMW

## 2018-03-06 RX ORDER — CHLORTHALIDONE 50 MG/1
TABLET ORAL
Qty: 30 TABLET | Refills: 6 | Status: ON HOLD | OUTPATIENT
Start: 2018-03-06 | End: 2019-03-19 | Stop reason: SDUPTHER

## 2018-03-08 ENCOUNTER — TELEPHONE (OUTPATIENT)
Dept: INTERNAL MEDICINE | Facility: CLINIC | Age: 68
End: 2018-03-08

## 2018-03-08 DIAGNOSIS — I10 HYPERTENSION GOAL BP (BLOOD PRESSURE) < 130/80: Primary | ICD-10-CM

## 2018-03-08 NOTE — TELEPHONE ENCOUNTER
Patient came in for blood pressure check 03/07/2018  Blood pressure upon discharge 148/62  Patient taking her lisinopril, atorvastatin     Is the patient also taking Hygroten once daily, and amlodipine 10 mg daily, and Lisinoipril 40 mg bid?

## 2018-03-15 RX ORDER — INSULIN ASPART 100 [IU]/ML
INJECTION, SOLUTION INTRAVENOUS; SUBCUTANEOUS
Qty: 1 BOX | Refills: 0 | Status: SHIPPED | OUTPATIENT
Start: 2018-03-15 | End: 2018-06-05 | Stop reason: SDUPTHER

## 2018-03-15 NOTE — TELEPHONE ENCOUNTER
Patient said she is now seeing PCP for treatment of her diabetes. Will forward Rx to her PCP next time.

## 2018-03-16 RX ORDER — METOPROLOL SUCCINATE 25 MG/1
25 TABLET, EXTENDED RELEASE ORAL DAILY
Qty: 30 TABLET | Refills: 0 | Status: SHIPPED | OUTPATIENT
Start: 2018-03-16 | End: 2018-04-06 | Stop reason: SDUPTHER

## 2018-03-16 NOTE — TELEPHONE ENCOUNTER
Metoprolol 25 mg added.   Continue chlorthalidone, lisinopril and amlodipine.   Recommend following a low sodium diet.   F/U with Adis in 2 weeks for HTN.

## 2018-03-26 ENCOUNTER — PATIENT OUTREACH (OUTPATIENT)
Dept: ADMINISTRATIVE | Facility: HOSPITAL | Age: 68
End: 2018-03-26

## 2018-03-26 NOTE — PROGRESS NOTES
Per Dr Mitchell-[facility]-last visit was 2016. Pt does have appt scheduled for May 22, 2018. Will follow up then.

## 2018-04-04 RX ORDER — AMLODIPINE BESYLATE 10 MG/1
TABLET ORAL
Qty: 30 TABLET | Refills: 1 | OUTPATIENT
Start: 2018-04-04

## 2018-04-06 ENCOUNTER — TELEPHONE (OUTPATIENT)
Dept: INTERNAL MEDICINE | Facility: CLINIC | Age: 68
End: 2018-04-06

## 2018-04-06 DIAGNOSIS — I10 HYPERTENSION GOAL BP (BLOOD PRESSURE) < 130/80: ICD-10-CM

## 2018-04-06 RX ORDER — METOPROLOL SUCCINATE 25 MG/1
TABLET, EXTENDED RELEASE ORAL
Qty: 90 TABLET | Refills: 0 | Status: SHIPPED | OUTPATIENT
Start: 2018-04-06 | End: 2018-07-18 | Stop reason: SDUPTHER

## 2018-04-06 NOTE — TELEPHONE ENCOUNTER
----- Message from Camryn Christine sent at 4/6/2018 11:02 AM CDT -----  Contact: pt  She's calling to discuss medication, please advise [phone] (home)

## 2018-04-06 NOTE — TELEPHONE ENCOUNTER
----- Message from Camryn Christine sent at 4/6/2018 11:02 AM CDT -----  Contact: pt  She's calling to discuss medication, please advise [phone] (home)

## 2018-04-06 NOTE — TELEPHONE ENCOUNTER
Pt called stating she is unsure of what medication she is supposed to be taking. Does she need to take the lisinopril, metoprolol succinate, or amlodipine? pls advise

## 2018-04-06 NOTE — TELEPHONE ENCOUNTER
----- Message from Milli Castillo MA sent at 4/6/2018 11:24 AM CDT -----  Pt unsure of which medication she should be taking. Lisinopril 40mg, metoprolol 25, or amlodipine 10mg pls advise

## 2018-04-10 RX ORDER — AMLODIPINE BESYLATE 10 MG/1
10 TABLET ORAL DAILY
Qty: 30 TABLET | Refills: 1 | Status: SHIPPED | OUTPATIENT
Start: 2018-04-10 | End: 2018-06-15 | Stop reason: SDUPTHER

## 2018-04-10 NOTE — TELEPHONE ENCOUNTER
Reviewed BP meds with pt, she understands she should be taking amlodipine 10mg, metoprolol 25mg and lisinopril 40mg. Pt needs refill on amlopine. Last rx'd 02/2018 with 1 refill. Refill coming due 04/22/18. Please send to pharm.

## 2018-04-10 NOTE — TELEPHONE ENCOUNTER
----- Message from Matt Ortiz sent at 4/10/2018 12:41 PM CDT -----  Contact: Pt   States she's rtn nurses call and can be reached at [phone]//thanks/dbw

## 2018-04-12 ENCOUNTER — TELEPHONE (OUTPATIENT)
Dept: INTERNAL MEDICINE | Facility: CLINIC | Age: 68
End: 2018-04-12

## 2018-04-12 NOTE — TELEPHONE ENCOUNTER
Pt states she was unable to  meds. Spoke with pharm who states last pickup was 03/17/18 and was to early to  on 04/10. However, she can  today. Pt notified and verbalized understanding.

## 2018-04-12 NOTE — TELEPHONE ENCOUNTER
----- Message from Edda Elliott sent at 4/12/2018  2:13 PM CDT -----  Contact: pt  Pt is requesting a call back from nurse in regards to her med not being at the pharmacy in [place].        [phone] (home)       1. What is the name of the medication you are requesting?amlodipine  2. What is the dose? 10 mg  3. How do you take the medication? Orally, topically, etc? orally  4. How often do you take this medication? 1 daily  5. Do you need a 30 day or 90 day supply? 30  6. How many refills are you requesting? Per   7. What is your preferred pharmacy and location of the pharmacy?     [facility] [address], [place] - [address]  [address]  [place] [address]  Phone: [phone] Fax: [phone]      8. Who can we contact with further questions? pt

## 2018-04-24 RX ORDER — METFORMIN HYDROCHLORIDE 1000 MG/1
TABLET ORAL
Qty: 180 TABLET | Refills: 1 | Status: SHIPPED | OUTPATIENT
Start: 2018-04-24 | End: 2018-11-03 | Stop reason: SDUPTHER

## 2018-05-07 RX ORDER — FENOFIBRATE 200 MG/1
CAPSULE ORAL
Qty: 30 CAPSULE | Refills: 6 | Status: SHIPPED | OUTPATIENT
Start: 2018-05-07 | End: 2018-12-16 | Stop reason: SDUPTHER

## 2018-05-08 ENCOUNTER — LAB VISIT (OUTPATIENT)
Dept: LAB | Facility: HOSPITAL | Age: 68
End: 2018-05-08
Attending: INTERNAL MEDICINE
Payer: MEDICARE

## 2018-05-08 DIAGNOSIS — E11.29 DIABETES MELLITUS WITH MICROALBUMINURIA: ICD-10-CM

## 2018-05-08 DIAGNOSIS — E11.69 COMBINED HYPERLIPIDEMIA ASSOCIATED WITH TYPE 2 DIABETES MELLITUS: ICD-10-CM

## 2018-05-08 DIAGNOSIS — E78.2 COMBINED HYPERLIPIDEMIA ASSOCIATED WITH TYPE 2 DIABETES MELLITUS: ICD-10-CM

## 2018-05-08 DIAGNOSIS — I15.2 HYPERTENSION ASSOCIATED WITH DIABETES: ICD-10-CM

## 2018-05-08 DIAGNOSIS — E11.59 HYPERTENSION ASSOCIATED WITH DIABETES: ICD-10-CM

## 2018-05-08 DIAGNOSIS — R80.9 DIABETES MELLITUS WITH MICROALBUMINURIA: ICD-10-CM

## 2018-05-08 LAB
ALBUMIN SERPL BCP-MCNC: 4 G/DL
ALP SERPL-CCNC: 50 U/L
ALT SERPL W/O P-5'-P-CCNC: 21 U/L
ANION GAP SERPL CALC-SCNC: 11 MMOL/L
AST SERPL-CCNC: 48 U/L
BILIRUB SERPL-MCNC: 0.7 MG/DL
BUN SERPL-MCNC: 24 MG/DL
CALCIUM SERPL-MCNC: 9.5 MG/DL
CHLORIDE SERPL-SCNC: 101 MMOL/L
CHOLEST SERPL-MCNC: 147 MG/DL
CHOLEST/HDLC SERPL: 6.7 {RATIO}
CO2 SERPL-SCNC: 27 MMOL/L
CREAT SERPL-MCNC: 1.1 MG/DL
EST. GFR  (AFRICAN AMERICAN): 59.6 ML/MIN/1.73 M^2
EST. GFR  (NON AFRICAN AMERICAN): 51.7 ML/MIN/1.73 M^2
ESTIMATED AVG GLUCOSE: 151 MG/DL
GLUCOSE SERPL-MCNC: 73 MG/DL
HBA1C MFR BLD HPLC: 6.9 %
HDLC SERPL-MCNC: 22 MG/DL
HDLC SERPL: 15 %
LDLC SERPL CALC-MCNC: 49.8 MG/DL
NONHDLC SERPL-MCNC: 125 MG/DL
POTASSIUM SERPL-SCNC: 4.4 MMOL/L
PROT SERPL-MCNC: 6.9 G/DL
SODIUM SERPL-SCNC: 139 MMOL/L
TRIGL SERPL-MCNC: 376 MG/DL

## 2018-05-08 PROCEDURE — 83036 HEMOGLOBIN GLYCOSYLATED A1C: CPT

## 2018-05-08 PROCEDURE — 80061 LIPID PANEL: CPT

## 2018-05-08 PROCEDURE — 80053 COMPREHEN METABOLIC PANEL: CPT

## 2018-05-08 PROCEDURE — 36415 COLL VENOUS BLD VENIPUNCTURE: CPT

## 2018-05-15 ENCOUNTER — TELEPHONE (OUTPATIENT)
Dept: INTERNAL MEDICINE | Facility: CLINIC | Age: 68
End: 2018-05-15

## 2018-05-15 DIAGNOSIS — E11.29 DIABETES MELLITUS WITH MICROALBUMINURIA: Primary | ICD-10-CM

## 2018-05-15 DIAGNOSIS — R80.9 DIABETES MELLITUS WITH MICROALBUMINURIA: Primary | ICD-10-CM

## 2018-05-25 ENCOUNTER — PATIENT OUTREACH (OUTPATIENT)
Dept: ADMINISTRATIVE | Facility: HOSPITAL | Age: 68
End: 2018-05-25

## 2018-05-29 RX ORDER — INSULIN ASPART 100 [IU]/ML
INJECTION, SOLUTION INTRAVENOUS; SUBCUTANEOUS
Refills: 0 | OUTPATIENT
Start: 2018-05-29

## 2018-06-05 NOTE — TELEPHONE ENCOUNTER
I spoke to the above patient, patient stated she is out of her novolog and she wants to know why the insurance rejecting her medicine

## 2018-06-05 NOTE — TELEPHONE ENCOUNTER
----- Message from Rashel Mejía sent at 6/5/2018 12:16 PM CDT -----  The pt would like to receive a call back from the staff of Dr. Bonilla.  The pt can be reached at [phone].

## 2018-06-07 RX ORDER — INSULIN ASPART 100 [IU]/ML
INJECTION, SOLUTION INTRAVENOUS; SUBCUTANEOUS
Qty: 1 BOX | Refills: 0 | Status: SHIPPED | OUTPATIENT
Start: 2018-06-07 | End: 2018-08-16 | Stop reason: SDUPTHER

## 2018-06-15 DIAGNOSIS — I10 HYPERTENSION GOAL BP (BLOOD PRESSURE) < 130/80: Chronic | ICD-10-CM

## 2018-06-15 RX ORDER — AMLODIPINE BESYLATE 10 MG/1
TABLET ORAL
Qty: 30 TABLET | Refills: 1 | Status: SHIPPED | OUTPATIENT
Start: 2018-06-15 | End: 2018-08-13 | Stop reason: SDUPTHER

## 2018-06-18 RX ORDER — LISINOPRIL 40 MG/1
TABLET ORAL
Qty: 60 TABLET | Refills: 6 | Status: SHIPPED | OUTPATIENT
Start: 2018-06-18 | End: 2019-01-25 | Stop reason: SDUPTHER

## 2018-07-06 RX ORDER — ATORVASTATIN CALCIUM 80 MG/1
TABLET, FILM COATED ORAL
Qty: 90 TABLET | Refills: 1 | Status: SHIPPED | OUTPATIENT
Start: 2018-07-06 | End: 2018-12-06 | Stop reason: SDUPTHER

## 2018-07-18 DIAGNOSIS — I10 HYPERTENSION GOAL BP (BLOOD PRESSURE) < 130/80: ICD-10-CM

## 2018-07-19 RX ORDER — METOPROLOL SUCCINATE 25 MG/1
TABLET, EXTENDED RELEASE ORAL
Qty: 90 TABLET | Refills: 0 | Status: SHIPPED | OUTPATIENT
Start: 2018-07-19 | End: 2018-08-16 | Stop reason: SDUPTHER

## 2018-08-08 ENCOUNTER — LAB VISIT (OUTPATIENT)
Dept: LAB | Facility: HOSPITAL | Age: 68
End: 2018-08-08
Attending: INTERNAL MEDICINE
Payer: MEDICARE

## 2018-08-08 DIAGNOSIS — R80.9 DIABETES MELLITUS WITH MICROALBUMINURIA: ICD-10-CM

## 2018-08-08 DIAGNOSIS — E78.2 COMBINED HYPERLIPIDEMIA ASSOCIATED WITH TYPE 2 DIABETES MELLITUS: ICD-10-CM

## 2018-08-08 DIAGNOSIS — E11.69 COMBINED HYPERLIPIDEMIA ASSOCIATED WITH TYPE 2 DIABETES MELLITUS: ICD-10-CM

## 2018-08-08 DIAGNOSIS — I15.2 HYPERTENSION ASSOCIATED WITH DIABETES: ICD-10-CM

## 2018-08-08 DIAGNOSIS — E11.29 DIABETES MELLITUS WITH MICROALBUMINURIA: ICD-10-CM

## 2018-08-08 DIAGNOSIS — E11.59 HYPERTENSION ASSOCIATED WITH DIABETES: ICD-10-CM

## 2018-08-08 LAB
ALBUMIN SERPL BCP-MCNC: 3.7 G/DL
ALP SERPL-CCNC: 54 U/L
ALT SERPL W/O P-5'-P-CCNC: 37 U/L
ANION GAP SERPL CALC-SCNC: 11 MMOL/L
AST SERPL-CCNC: 67 U/L
BILIRUB SERPL-MCNC: 0.7 MG/DL
BUN SERPL-MCNC: 17 MG/DL
CALCIUM SERPL-MCNC: 8.5 MG/DL
CHLORIDE SERPL-SCNC: 102 MMOL/L
CHOLEST SERPL-MCNC: 141 MG/DL
CHOLEST/HDLC SERPL: 5.9 {RATIO}
CO2 SERPL-SCNC: 24 MMOL/L
CREAT SERPL-MCNC: 1.1 MG/DL
EST. GFR  (AFRICAN AMERICAN): 59.6 ML/MIN/1.73 M^2
EST. GFR  (NON AFRICAN AMERICAN): 51.7 ML/MIN/1.73 M^2
ESTIMATED AVG GLUCOSE: 148 MG/DL
GLUCOSE SERPL-MCNC: 95 MG/DL
HBA1C MFR BLD HPLC: 6.8 %
HDLC SERPL-MCNC: 24 MG/DL
HDLC SERPL: 17 %
LDLC SERPL CALC-MCNC: 39 MG/DL
NONHDLC SERPL-MCNC: 117 MG/DL
POTASSIUM SERPL-SCNC: 4 MMOL/L
PROT SERPL-MCNC: 6.6 G/DL
SODIUM SERPL-SCNC: 137 MMOL/L
TRIGL SERPL-MCNC: 390 MG/DL

## 2018-08-08 PROCEDURE — 80053 COMPREHEN METABOLIC PANEL: CPT

## 2018-08-08 PROCEDURE — 80061 LIPID PANEL: CPT

## 2018-08-08 PROCEDURE — 36415 COLL VENOUS BLD VENIPUNCTURE: CPT

## 2018-08-08 PROCEDURE — 83036 HEMOGLOBIN GLYCOSYLATED A1C: CPT

## 2018-08-13 RX ORDER — AMLODIPINE BESYLATE 10 MG/1
TABLET ORAL
Qty: 30 TABLET | Refills: 1 | Status: SHIPPED | OUTPATIENT
Start: 2018-08-13 | End: 2018-10-18 | Stop reason: SDUPTHER

## 2018-08-15 RX ORDER — INSULIN ASPART 100 [IU]/ML
INJECTION, SOLUTION INTRAVENOUS; SUBCUTANEOUS
Refills: 0 | Status: CANCELLED | OUTPATIENT
Start: 2018-08-15

## 2018-08-16 ENCOUNTER — OFFICE VISIT (OUTPATIENT)
Dept: INTERNAL MEDICINE | Facility: CLINIC | Age: 68
End: 2018-08-16
Payer: MEDICARE

## 2018-08-16 VITALS
WEIGHT: 153 LBS | SYSTOLIC BLOOD PRESSURE: 142 MMHG | HEART RATE: 98 BPM | OXYGEN SATURATION: 98 % | HEIGHT: 59 IN | BODY MASS INDEX: 30.84 KG/M2 | TEMPERATURE: 98 F | DIASTOLIC BLOOD PRESSURE: 78 MMHG

## 2018-08-16 DIAGNOSIS — Z79.4 LONG-TERM INSULIN USE: ICD-10-CM

## 2018-08-16 DIAGNOSIS — E78.2 MIXED HYPERLIPIDEMIA: ICD-10-CM

## 2018-08-16 DIAGNOSIS — Z71.85 IMMUNIZATION COUNSELING: ICD-10-CM

## 2018-08-16 DIAGNOSIS — E11.29 DIABETES MELLITUS WITH MICROALBUMINURIA: ICD-10-CM

## 2018-08-16 DIAGNOSIS — I10 HYPERTENSION GOAL BP (BLOOD PRESSURE) < 130/80: Primary | ICD-10-CM

## 2018-08-16 DIAGNOSIS — R80.9 DIABETES MELLITUS WITH MICROALBUMINURIA: ICD-10-CM

## 2018-08-16 PROCEDURE — 99999 PR PBB SHADOW E&M-EST. PATIENT-LVL III: CPT | Mod: PBBFAC,,, | Performed by: INTERNAL MEDICINE

## 2018-08-16 PROCEDURE — 3044F HG A1C LEVEL LT 7.0%: CPT | Mod: CPTII,S$GLB,, | Performed by: INTERNAL MEDICINE

## 2018-08-16 PROCEDURE — 99214 OFFICE O/P EST MOD 30 MIN: CPT | Mod: S$GLB,,, | Performed by: INTERNAL MEDICINE

## 2018-08-16 PROCEDURE — 3078F DIAST BP <80 MM HG: CPT | Mod: CPTII,S$GLB,, | Performed by: INTERNAL MEDICINE

## 2018-08-16 PROCEDURE — 3077F SYST BP >= 140 MM HG: CPT | Mod: CPTII,S$GLB,, | Performed by: INTERNAL MEDICINE

## 2018-08-16 RX ORDER — METOPROLOL SUCCINATE 25 MG/1
25 TABLET, EXTENDED RELEASE ORAL DAILY
Qty: 30 TABLET | Refills: 3 | Status: ON HOLD | OUTPATIENT
Start: 2018-08-16 | End: 2019-03-19

## 2018-08-16 RX ORDER — INSULIN ASPART 100 [IU]/ML
INJECTION, SOLUTION INTRAVENOUS; SUBCUTANEOUS
Qty: 1 BOX | Refills: 6 | Status: SHIPPED | OUTPATIENT
Start: 2018-08-16 | End: 2019-09-17 | Stop reason: SDUPTHER

## 2018-08-20 NOTE — PROGRESS NOTES
Subjective:       Patient ID: Luda Jc is a 67 y.o. female.    Chief Complaint: Follow-up    Luda Jc  67 y.o. White female    Patient presents with:  Follow-up    HPI: Here today to follow up on chronic conditions.  HTN--not at goal. She has been compliant with lisinopril, amlodipine and chlorthalidone but has not been taking metoprolol. She denies symptoms.   Diabetes--stable on metformin, glimepiride, Novolog and Lantus.                      HGBA1C                   6.8 (H)             08/08/2018            Microalbuminuria--improved on lisinopril. She denies taking NSAID's.   Hyperlipidemia--compliant with fenofibrate and atorvastatin. She has not been taking fish oil.                       CHOL                     141                 08/08/2018                 HDL                      24 (L)              08/08/2018                 LDLCALC                  39.0 (L)            08/08/2018                 TRIG                     390 (H)             08/08/2018                  Past Medical History:  10/11/2014: Abnormal LFTs (liver function tests)  Arthritis  Chronic kidney disease  Diabetes mellitus, type 2  Fatty liver      Comment:  CT Abdomen/pelvis 6/24/2015---Fatty infiltration of the                liver.  Hyperlipidemia  Hypertension  10/11/2014: Insulin long-term use  Nephrolithiasis      Comment:  CT Abdomen/pelvis 6/24/2015---right nephrolithiasis.  Obesity  6/16/2014: Stage II infiltrating ductal carcinoma of breast      Comment:  Followed by Dr. Virgen     Current Outpatient Medications on File Prior to Visit:  ACCU-CHEK SOFT DEV LANCETS Kit, USE AS DIRECTED, Disp: 1 each, Rfl: 0  amLODIPine (NORVASC) 10 MG tablet, TAKE 1 TABLET BY MOUTH ONCE DAILY, Disp: 30 tablet, Rfl: 1  aspirin (ECOTRIN) 81 MG EC tablet, Take 81 mg by mouth once daily., Disp: , Rfl:   atorvastatin (LIPITOR) 80 MG tablet, TAKE ONE TABLET BY MOUTH ONCE DAILY, Disp: 90 tablet, Rfl: 1  blood sugar diagnostic Strp, Glucose  "testing three times daily., Disp: 300 strip, Rfl: 3  blood-glucose meter kit, Use as instructed, Disp: 1 each, Rfl: 0  chlorthalidone (HYGROTEN) 50 MG Tab, TAKE ONE TABLET BY MOUTH ONCE DAILY, Disp: 30 tablet, Rfl: 6  cyclobenzaprine (FLEXERIL) 5 MG tablet, Take 1 tablet (5 mg total) by mouth nightly., Disp: 30 tablet, Rfl: 0  diphenoxylate-atropine 2.5-0.025 mg (LOMOTIL) 2.5-0.025 mg per tablet, Take 2.5 tablets by mouth., Disp: , Rfl:   esomeprazole (NEXIUM) 20 MG capsule, Take 20 mg by mouth before breakfast., Disp: , Rfl:   fenofibrate micronized (LOFIBRA) 200 MG Cap, TAKE ONE CAPSULE BY MOUTH ONCE DAILY WITH BREAKFAST, Disp: 30 capsule, Rfl: 6  glimepiride (AMARYL) 2 MG tablet, TAKE ONE TABLET BY MOUTH ONCE DAILY WITH  BREAKFAST, Disp: 60 tablet, Rfl: 6  hydrocodone-acetaminophen 10-325mg (NORCO)  mg Tab, Take by mouth., Disp: , Rfl:   insulin glargine (LANTUS SOLOSTAR) 100 unit/mL (3 mL) InPn pen, INJECT 34 UNITS SUBCUTANEOUSLY IN THE MORNING AND 30 IN THE EVENING, Disp: 2 Box, Rfl: 6  lancets Misc, Glucose testing three times daily., Disp: 300 each, Rfl: 3  lisinopril (PRINIVIL,ZESTRIL) 40 MG tablet, TAKE ONE TABLET BY MOUTH TWICE DAILY, Disp: 60 tablet, Rfl: 6  metFORMIN (GLUCOPHAGE) 1000 MG tablet, TAKE ONE TABLET BY MOUTH TWICE DAILY WITH MEALS, Disp: 180 tablet, Rfl: 1  omega-3 fatty acids-vitamin E (FISH OIL) 1,000 mg Cap, Take two capsules daily., Disp: , Rfl: 0  pen needle, diabetic (BD ULTRA-FINE FATEMEH PEN NEEDLES) 32 gauge x 5/32" Ndle, Use once daily. 250.00 IDDM, Disp: 100 each, Rfl: 3  tamoxifen (NOLVADEX) 20 MG Tab, , Disp: , Rfl:   triamcinolone acetonide 0.1% (KENALOG) 0.1 % cream, Apply topically 3 (three) times daily., Disp: 15 g, Rfl: 3    Allergies:  Review of patient's allergies indicates:  No Known Allergies        Review of Systems   Constitutional: Negative for appetite change, fever and unexpected weight change.   Respiratory: Negative for shortness of breath.    Cardiovascular: " Negative for chest pain and leg swelling.   Gastrointestinal: Negative for abdominal pain.   Genitourinary: Negative for difficulty urinating.   Musculoskeletal: Positive for arthralgias. Negative for gait problem.   Neurological: Negative for dizziness, numbness and headaches.       Objective:      Physical Exam   Constitutional: She is oriented to person, place, and time. She appears well-developed and well-nourished. No distress.   Eyes: No scleral icterus.   Neck: No tracheal deviation present.   Cardiovascular: Normal rate, regular rhythm and normal heart sounds.   Pulmonary/Chest: Effort normal and breath sounds normal. No respiratory distress. She has no wheezes. She has no rales.   Abdominal: Soft. Bowel sounds are normal.   Musculoskeletal: She exhibits no edema.   Neurological: She is alert and oriented to person, place, and time.   Skin: Skin is warm and dry.   Psychiatric: She has a normal mood and affect.   Vitals reviewed.      Assessment:       1. Hypertension goal BP (blood pressure) < 130/80    2. Diabetes mellitus with microalbuminuria    3. Long-term insulin use    4. Mixed hyperlipidemia    5. Immunization counseling        Plan:       Luda was seen today for follow-up.    Diagnoses and all orders for this visit:    Hypertension goal BP (blood pressure) < 130/80  -     metoprolol succinate (TOPROL-XL) 25 MG 24 hr tablet; Take 1 tablet (25 mg total) by mouth once daily.  -     Continue amlodipine, chlorthalidone and lisinopril     Diabetes mellitus with microalbuminuria  -     Refill insulin aspart U-100 (NOVOLOG FLEXPEN U-100 INSULIN) 100 unit/mL InPn pen; INJECT 10 UNITS SUBCUTANEOUSLY THREE TIMES DAILY WITH MEALS  -     Continue glimepiride, metformin and Lantus  -     Continue lisinopril  -     Advised to avoid NSAID's   -     Microalbumin/creatinine urine ratio; Future    Long-term insulin use  -     insulin aspart U-100 (NOVOLOG FLEXPEN U-100 INSULIN) 100 unit/mL InPn pen; INJECT 10 UNITS  SUBCUTANEOUSLY THREE TIMES DAILY WITH MEALS    Mixed hyperlipidemia  -     Recommend adding fish oil daily  -     Continue atorvastatin and fenofibrate    Immunization counseling  -     Recommend flu and shingles vaccines    RTC in 2 weeks for b/p recheck.

## 2018-08-30 ENCOUNTER — CLINICAL SUPPORT (OUTPATIENT)
Dept: INTERNAL MEDICINE | Facility: CLINIC | Age: 68
End: 2018-08-30
Payer: MEDICARE

## 2018-08-30 VITALS — DIASTOLIC BLOOD PRESSURE: 70 MMHG | SYSTOLIC BLOOD PRESSURE: 136 MMHG

## 2018-08-30 PROCEDURE — 99999 PR PBB SHADOW E&M-EST. PATIENT-LVL III: CPT | Mod: PBBFAC,,,

## 2018-08-30 NOTE — PROGRESS NOTES
Patient in today for recheck of blood pressure that was 142/78 at her visit on 8/16/18. Today her pressure is 136/70. She feels fine, not having any issues to speak of. She is taking her medications as prescribed. She will continue with her current plan unless she hears different from you. She did request that I let you know that she is getting injections every 6 months for her bone density with Dr. Virgen that she says you requested. She says she forgot to talk with you at her appt. She says Dr. Virgen wanted to make sure you are monitoring her progress. I told her I would relay the message to you.

## 2018-08-31 ENCOUNTER — TELEPHONE (OUTPATIENT)
Dept: INTERNAL MEDICINE | Facility: CLINIC | Age: 68
End: 2018-08-31

## 2018-08-31 NOTE — PROGRESS NOTES
Obtain records from Dr. Virgen's office.   Repeat DEXA will be due in April of 2019.   F/U with Adis in 3 months for HTN.

## 2018-09-06 ENCOUNTER — TELEPHONE (OUTPATIENT)
Dept: INTERNAL MEDICINE | Facility: CLINIC | Age: 68
End: 2018-09-06

## 2018-09-06 NOTE — TELEPHONE ENCOUNTER
Called pt to review  orders r/t Blood pressure visit, unable to speak to patient , left message to call facility at [phone]- we need to schedule: 1) dexa Scan for April 2019 2)f/u with Adis in 3 months HTN 3)obtain records from Dr. Mathews office.

## 2018-09-27 ENCOUNTER — TELEPHONE (OUTPATIENT)
Dept: INTERNAL MEDICINE | Facility: CLINIC | Age: 68
End: 2018-09-27

## 2018-09-27 NOTE — TELEPHONE ENCOUNTER
----- Message from Teressa Rojas sent at 9/27/2018  1:13 PM CDT -----  Contact: LUIS CARLOS  States the pt needs to be worked in on 10/4 for a hospital f/u appt, the pt can be reached at [phone]///thxMW

## 2018-09-28 ENCOUNTER — TELEPHONE (OUTPATIENT)
Dept: INTERNAL MEDICINE | Facility: CLINIC | Age: 68
End: 2018-09-28

## 2018-09-28 NOTE — TELEPHONE ENCOUNTER
----- Message from Norberto Hoskins sent at 9/28/2018 10:26 AM CDT -----  Contact: [phone]  Would like return call from nurse. Please call back at [phone].  Md Harrison

## 2018-10-01 ENCOUNTER — OFFICE VISIT (OUTPATIENT)
Dept: INTERNAL MEDICINE | Facility: CLINIC | Age: 68
End: 2018-10-01
Payer: MEDICARE

## 2018-10-01 VITALS
BODY MASS INDEX: 29.77 KG/M2 | DIASTOLIC BLOOD PRESSURE: 70 MMHG | RESPIRATION RATE: 20 BRPM | HEART RATE: 100 BPM | TEMPERATURE: 99 F | WEIGHT: 147.69 LBS | HEIGHT: 59 IN | SYSTOLIC BLOOD PRESSURE: 126 MMHG

## 2018-10-01 DIAGNOSIS — N18.30 DIABETES MELLITUS WITH STAGE 3 CHRONIC KIDNEY DISEASE: ICD-10-CM

## 2018-10-01 DIAGNOSIS — I15.2 HYPERTENSION ASSOCIATED WITH DIABETES: ICD-10-CM

## 2018-10-01 DIAGNOSIS — E11.22 DIABETES MELLITUS WITH STAGE 3 CHRONIC KIDNEY DISEASE: ICD-10-CM

## 2018-10-01 DIAGNOSIS — D50.9 MICROCYTIC ANEMIA: Primary | ICD-10-CM

## 2018-10-01 DIAGNOSIS — E11.59 HYPERTENSION ASSOCIATED WITH DIABETES: ICD-10-CM

## 2018-10-01 PROCEDURE — 3074F SYST BP LT 130 MM HG: CPT | Mod: CPTII,,, | Performed by: INTERNAL MEDICINE

## 2018-10-01 PROCEDURE — 3044F HG A1C LEVEL LT 7.0%: CPT | Mod: CPTII,,, | Performed by: INTERNAL MEDICINE

## 2018-10-01 PROCEDURE — 99999 PR PBB SHADOW E&M-EST. PATIENT-LVL III: CPT | Mod: PBBFAC,,, | Performed by: INTERNAL MEDICINE

## 2018-10-01 PROCEDURE — 99214 OFFICE O/P EST MOD 30 MIN: CPT | Mod: S$PBB,,, | Performed by: INTERNAL MEDICINE

## 2018-10-01 PROCEDURE — 1101F PT FALLS ASSESS-DOCD LE1/YR: CPT | Mod: CPTII,,, | Performed by: INTERNAL MEDICINE

## 2018-10-01 PROCEDURE — 99213 OFFICE O/P EST LOW 20 MIN: CPT | Mod: PBBFAC | Performed by: INTERNAL MEDICINE

## 2018-10-01 PROCEDURE — 99499 UNLISTED E&M SERVICE: CPT | Mod: S$GLB,,, | Performed by: INTERNAL MEDICINE

## 2018-10-01 PROCEDURE — 3078F DIAST BP <80 MM HG: CPT | Mod: CPTII,,, | Performed by: INTERNAL MEDICINE

## 2018-10-01 NOTE — PROGRESS NOTES
Subjective:       Patient ID: Luda Jc is a 67 y.o. female.    Chief Complaint: Hospital Follow Up    Luda Jc  67 y.o. White female    Patient presents with:  Hospital Follow Up    HPI: Presents to the clinic for a hospital follow up. She was discharged on 9/27 from [facility]. She was admitted for symptomatic anemia and MARIAELENA. She received 2 units of PRBC's and IVF's. Her kidney function improved but her anemia did not. Upon discharge her H/H was  6.8/20.1 and MCV 75. She has an appointment to see GI and hematology.   Her metformin and chlorthalidone were placed on hold.   Her b/p is stable off chlorthalidone.     Past Medical History:  10/11/2014: Abnormal LFTs (liver function tests)  Arthritis  Chronic kidney disease  Diabetes mellitus, type 2  Fatty liver      Comment:  CT Abdomen/pelvis 6/24/2015---Fatty infiltration of the                liver.  Hyperlipidemia  Hypertension  10/11/2014: Insulin long-term use  Nephrolithiasis      Comment:  CT Abdomen/pelvis 6/24/2015---right nephrolithiasis.  Obesity  6/16/2014: Stage II infiltrating ductal carcinoma of breast      Comment:  Followed by Dr. Virgen     Current Outpatient Medications on File Prior to Visit:  ACCU-CHEK SOFT DEV LANCETS Kit, USE AS DIRECTED, Disp: 1 each, Rfl: 0  amLODIPine (NORVASC) 10 MG tablet, TAKE 1 TABLET BY MOUTH ONCE DAILY, Disp: 30 tablet, Rfl: 1  aspirin (ECOTRIN) 81 MG EC tablet, Take 81 mg by mouth once daily., Disp: , Rfl:   atorvastatin (LIPITOR) 80 MG tablet, TAKE ONE TABLET BY MOUTH ONCE DAILY, Disp: 90 tablet, Rfl: 1  blood sugar diagnostic Strp, Glucose testing three times daily., Disp: 300 strip, Rfl: 3  blood-glucose meter kit, Use as instructed, Disp: 1 each, Rfl: 0  cyclobenzaprine (FLEXERIL) 5 MG tablet, Take 1 tablet (5 mg total) by mouth nightly., Disp: 30 tablet, Rfl: 0  diphenoxylate-atropine 2.5-0.025 mg (LOMOTIL) 2.5-0.025 mg per tablet, Take 2.5 tablets by mouth., Disp: , Rfl:   esomeprazole (NEXIUM) 20 MG capsule,  "Take 20 mg by mouth before breakfast., Disp: , Rfl:   fenofibrate micronized (LOFIBRA) 200 MG Cap, TAKE ONE CAPSULE BY MOUTH ONCE DAILY WITH BREAKFAST, Disp: 30 capsule, Rfl: 6  glimepiride (AMARYL) 2 MG tablet, TAKE ONE TABLET BY MOUTH ONCE DAILY WITH  BREAKFAST, Disp: 60 tablet, Rfl: 6  hydrocodone-acetaminophen 10-325mg (NORCO)  mg Tab, Take by mouth., Disp: , Rfl:   insulin aspart U-100 (NOVOLOG FLEXPEN U-100 INSULIN) 100 unit/mL InPn pen, INJECT 10 UNITS SUBCUTANEOUSLY THREE TIMES DAILY WITH MEALS, Disp: 1 Box, Rfl: 6  insulin glargine (LANTUS SOLOSTAR) 100 unit/mL (3 mL) InPn pen, INJECT 34 UNITS SUBCUTANEOUSLY IN THE MORNING AND 30 IN THE EVENING, Disp: 2 Box, Rfl: 6  lancets Misc, Glucose testing three times daily., Disp: 300 each, Rfl: 3  lisinopril (PRINIVIL,ZESTRIL) 40 MG tablet, TAKE ONE TABLET BY MOUTH TWICE DAILY, Disp: 60 tablet, Rfl: 6  metoprolol succinate (TOPROL-XL) 25 MG 24 hr tablet, Take 1 tablet (25 mg total) by mouth once daily., Disp: 30 tablet, Rfl: 3  omega-3 fatty acids-vitamin E (FISH OIL) 1,000 mg Cap, Take two capsules daily., Disp: , Rfl: 0  pen needle, diabetic (BD ULTRA-FINE FATEMEH PEN NEEDLES) 32 gauge x 5/32" Ndle, Use once daily. 250.00 IDDM, Disp: 100 each, Rfl: 3  tamoxifen (NOLVADEX) 20 MG Tab, , Disp: , Rfl:   triamcinolone acetonide 0.1% (KENALOG) 0.1 % cream, Apply topically 3 (three) times daily., Disp: 15 g, Rfl: 3  chlorthalidone (HYGROTEN) 50 MG Tab, TAKE ONE TABLET BY MOUTH ONCE DAILY, Disp: 30 tablet, Rfl: 6  metFORMIN (GLUCOPHAGE) 1000 MG tablet, TAKE ONE TABLET BY MOUTH TWICE DAILY WITH MEALS, Disp: 180 tablet, Rfl: 1    Allergies:  Review of patient's allergies indicates:  No Known Allergies          Review of Systems   Constitutional: Negative for fever and unexpected weight change.   Respiratory: Negative for shortness of breath.    Cardiovascular: Negative for chest pain.   Gastrointestinal: Negative for abdominal pain and diarrhea.   Genitourinary: Negative " for difficulty urinating.   Musculoskeletal: Positive for gait problem.   Neurological: Positive for weakness. Negative for dizziness.       Objective:      Physical Exam   Constitutional: She is oriented to person, place, and time. She appears well-developed and well-nourished. No distress.   Eyes: No scleral icterus.   Cardiovascular: Normal rate, regular rhythm and normal heart sounds.   Pulmonary/Chest: Effort normal and breath sounds normal. No respiratory distress.   Abdominal: Soft. Bowel sounds are normal.   Neurological: She is alert and oriented to person, place, and time.   Skin: Skin is warm and dry.   Psychiatric: She has a normal mood and affect.   Vitals reviewed.      Assessment:       1. Microcytic anemia    2. Diabetes mellitus with stage 3 chronic kidney disease    3. Hypertension associated with diabetes        Plan:       Luda was seen today for hospital follow up.    Diagnoses and all orders for this visit:    Microcytic anemia  -     CBC auto differential; Future  -     Advised to keep appointments with GI and hematology     Diabetes mellitus with stage 3 chronic kidney disease  -     Advised to resume metformin    Hypertension associated with diabetes  -     Advised to resume metformin  -     Continue to hold chlorthalidone     RTC as previously scheduled and as needed.

## 2018-10-05 ENCOUNTER — OFFICE VISIT (OUTPATIENT)
Dept: GASTROENTEROLOGY | Facility: CLINIC | Age: 68
End: 2018-10-05
Payer: MEDICARE

## 2018-10-05 ENCOUNTER — LAB VISIT (OUTPATIENT)
Dept: LAB | Facility: HOSPITAL | Age: 68
End: 2018-10-05
Attending: PHYSICIAN ASSISTANT
Payer: MEDICARE

## 2018-10-05 VITALS
WEIGHT: 147.94 LBS | HEART RATE: 77 BPM | BODY MASS INDEX: 29.82 KG/M2 | SYSTOLIC BLOOD PRESSURE: 122 MMHG | HEIGHT: 59 IN | DIASTOLIC BLOOD PRESSURE: 66 MMHG

## 2018-10-05 DIAGNOSIS — D50.9 IRON DEFICIENCY ANEMIA, UNSPECIFIED IRON DEFICIENCY ANEMIA TYPE: Primary | ICD-10-CM

## 2018-10-05 DIAGNOSIS — D50.9 IRON DEFICIENCY ANEMIA, UNSPECIFIED IRON DEFICIENCY ANEMIA TYPE: ICD-10-CM

## 2018-10-05 LAB
ALBUMIN SERPL BCP-MCNC: 3.5 G/DL
ALP SERPL-CCNC: 81 U/L
ALT SERPL W/O P-5'-P-CCNC: 24 U/L
ANION GAP SERPL CALC-SCNC: 12 MMOL/L
AST SERPL-CCNC: 55 U/L
BASOPHILS # BLD AUTO: 0.05 K/UL
BASOPHILS NFR BLD: 0.8 %
BILIRUB SERPL-MCNC: 0.8 MG/DL
BUN SERPL-MCNC: 17 MG/DL
CALCIUM SERPL-MCNC: 8.6 MG/DL
CHLORIDE SERPL-SCNC: 103 MMOL/L
CO2 SERPL-SCNC: 22 MMOL/L
CREAT SERPL-MCNC: 1.1 MG/DL
DIFFERENTIAL METHOD: ABNORMAL
EOSINOPHIL # BLD AUTO: 0.2 K/UL
EOSINOPHIL NFR BLD: 3.5 %
ERYTHROCYTE [DISTWIDTH] IN BLOOD BY AUTOMATED COUNT: 17.2 %
EST. GFR  (AFRICAN AMERICAN): 60 ML/MIN/1.73 M^2
EST. GFR  (NON AFRICAN AMERICAN): 52 ML/MIN/1.73 M^2
FERRITIN SERPL-MCNC: 132 NG/ML
GLUCOSE SERPL-MCNC: 175 MG/DL
HCT VFR BLD AUTO: 33.3 %
HGB BLD-MCNC: 10.3 G/DL
IRON SERPL-MCNC: 70 UG/DL
LYMPHOCYTES # BLD AUTO: 1.1 K/UL
LYMPHOCYTES NFR BLD: 16.4 %
MCH RBC QN AUTO: 25.6 PG
MCHC RBC AUTO-ENTMCNC: 30.9 G/DL
MCV RBC AUTO: 83 FL
MONOCYTES # BLD AUTO: 0.4 K/UL
MONOCYTES NFR BLD: 6 %
NEUTROPHILS # BLD AUTO: 4.9 K/UL
NEUTROPHILS NFR BLD: 73.3 %
PLATELET # BLD AUTO: 179 K/UL
PMV BLD AUTO: 9.6 FL
POTASSIUM SERPL-SCNC: 4 MMOL/L
PROT SERPL-MCNC: 7.1 G/DL
RBC # BLD AUTO: 4.03 M/UL
SATURATED IRON: 18 %
SODIUM SERPL-SCNC: 137 MMOL/L
TOTAL IRON BINDING CAPACITY: 398 UG/DL
TRANSFERRIN SERPL-MCNC: 269 MG/DL
WBC # BLD AUTO: 6.64 K/UL

## 2018-10-05 PROCEDURE — 99214 OFFICE O/P EST MOD 30 MIN: CPT | Mod: PBBFAC | Performed by: PHYSICIAN ASSISTANT

## 2018-10-05 PROCEDURE — 80053 COMPREHEN METABOLIC PANEL: CPT

## 2018-10-05 PROCEDURE — 3078F DIAST BP <80 MM HG: CPT | Mod: CPTII,,, | Performed by: PHYSICIAN ASSISTANT

## 2018-10-05 PROCEDURE — 99999 PR PBB SHADOW E&M-EST. PATIENT-LVL IV: CPT | Mod: PBBFAC,,, | Performed by: PHYSICIAN ASSISTANT

## 2018-10-05 PROCEDURE — 1101F PT FALLS ASSESS-DOCD LE1/YR: CPT | Mod: CPTII,,, | Performed by: PHYSICIAN ASSISTANT

## 2018-10-05 PROCEDURE — 85025 COMPLETE CBC W/AUTO DIFF WBC: CPT

## 2018-10-05 PROCEDURE — 99214 OFFICE O/P EST MOD 30 MIN: CPT | Mod: S$PBB,,, | Performed by: PHYSICIAN ASSISTANT

## 2018-10-05 PROCEDURE — 36415 COLL VENOUS BLD VENIPUNCTURE: CPT

## 2018-10-05 PROCEDURE — 83540 ASSAY OF IRON: CPT

## 2018-10-05 PROCEDURE — 3074F SYST BP LT 130 MM HG: CPT | Mod: CPTII,,, | Performed by: PHYSICIAN ASSISTANT

## 2018-10-05 PROCEDURE — 82728 ASSAY OF FERRITIN: CPT

## 2018-10-05 RX ORDER — SODIUM, POTASSIUM,MAG SULFATES 17.5-3.13G
SOLUTION, RECONSTITUTED, ORAL ORAL
Qty: 354 ML | Refills: 0 | Status: ON HOLD | OUTPATIENT
Start: 2018-10-05 | End: 2018-10-09 | Stop reason: CLARIF

## 2018-10-05 NOTE — PROGRESS NOTES
GI OUTPATIENT NOTE    PCP: Leanne Bonilla No address on file    Chief Complaint   Patient presents with    Anemia       HISTORY OF PRESENT ILLNESS:  67 y.o. female presents to the GI clinic today for initial evaluation. The patient complains of anemia. She was recently admitted to [facility] for MARIAELENA and anemia. Her Hgb was 6.8 so she was transfused two units. A repeat Hgb hasn't been done. She complains of fatigue. She denies hematochezia, melena, hematemesis, vomiting, dysphagia, abdominal pain or change in bowel habits. She does report nausea, decrease in appetite and mild weight loss. She has a history heartburn which is controlled with Nexium. She takes ASA but denies other NSAID use. She reports having a normal colonoscopy before 2014. She said a ten year repeat was recommended. The patient said she was anemic once around 2014 when she was receiving chemotherapy for breast cancer.     Past Medical History:   Diagnosis Date    Abnormal LFTs (liver function tests) 10/11/2014    Arthritis     Chronic kidney disease     Diabetes mellitus, type 2     Fatty liver     CT Abdomen/pelvis 6/24/2015---Fatty infiltration of the liver.    Hyperlipidemia     Hypertension     Insulin long-term use 10/11/2014    Nephrolithiasis     CT Abdomen/pelvis 6/24/2015---right nephrolithiasis.    Obesity     Stage II infiltrating ductal carcinoma of breast 6/16/2014    Followed by Dr. Virgen        Past Surgical History:   Procedure Laterality Date    APPENDECTOMY      BREAST LUMPECTOMY Right 2014    TONSILLECTOMY, ADENOIDECTOMY         Social History     Socioeconomic History    Marital status:      Spouse name: Not on file    Number of children: Not on file    Years of education: Not on file    Highest education level: Not on file   Social Needs    Financial resource strain: Not on file    Food insecurity - worry: Not on file    Food insecurity - inability: Not on file    Transportation needs - medical: Not on  "file    Transportation needs - non-medical: Not on file   Occupational History    Not on file   Tobacco Use    Smoking status: Never Smoker    Smokeless tobacco: Never Used   Substance and Sexual Activity    Alcohol use: Yes     Alcohol/week: 0.0 oz     Comment: " occassionally"    Drug use: No    Sexual activity: No     Partners: Male     Birth control/protection: Post-menopausal   Other Topics Concern    Not on file   Social History Narrative    . Lives with spouse. Has 2 children. Patient works full time for [employer].        Family History   Problem Relation Age of Onset    Cancer Mother         Stomach    Cancer Father         stomach    Heart disease Father     Stroke Son     Diabetes Paternal Aunt     Kidney disease Neg Hx        MEDS/ALLERGY:  The patient's medications and allergies were reviewed and updated in the EPIC chart.     Review of Systems   Constitutional: Positive for fatigue. Negative for fever.   HENT: Negative for hearing loss.    Eyes: Positive for visual disturbance.   Respiratory: Positive for shortness of breath. Negative for cough.    Cardiovascular: Negative for chest pain.   Gastrointestinal:        As per HPI.   Genitourinary: Negative for dysuria, frequency and hematuria.   Musculoskeletal: Positive for back pain. Negative for arthralgias.   Skin: Negative for rash.   Neurological: Positive for numbness. Negative for seizures, syncope and headaches.   Hematological: Does not bruise/bleed easily.   Psychiatric/Behavioral: The patient is not nervous/anxious.        Physical Exam   Constitutional: She is oriented to person, place, and time. Vital signs are normal. She appears well-developed and well-nourished.   HENT:   Head: Normocephalic and atraumatic.   Eyes: EOM are normal.   Neck: Normal range of motion. Neck supple. Carotid bruit is not present. No thyromegaly present.   Cardiovascular: Normal rate and regular rhythm.   No murmur heard.  Pulmonary/Chest: Effort " normal and breath sounds normal. No respiratory distress. She has no wheezes.   Abdominal: Soft. Normal appearance and bowel sounds are normal. She exhibits no distension and no mass. There is no tenderness.   Musculoskeletal: She exhibits no edema.   Neurological: She is alert and oriented to person, place, and time. No cranial nerve deficit. Gait normal.   Skin: Skin is warm and dry. No rash noted.   Psychiatric: Thought content normal.       LABS/IMAGING:   Pertinent results were reviewed.     ASSESSMENT:  1. Iron deficiency anemia, unspecified iron deficiency anemia type        PLAN:  The patient will need a EGD and Colonoscopy to work up her anemia. We will schedule this with her today and given her prep instructions.   She reports being fatigued so I will check a CBC STAT. She was cautioned about symptoms that would require her to report to the ER. She reported understanding.     ORDER SUMMARY:  Orders Placed This Encounter   Procedures    CBC auto differential    Comprehensive metabolic panel    Iron and TIBC    Ferritin      Follow-up if symptoms worsen or fail to improve.     Thank you for the opportunity to participate in the care of this patient. This consult was designated to me by my supervising physician. He fully participated in the development of the assessment and recommendations.    Mesfin Snow PA-C.

## 2018-10-08 ENCOUNTER — NURSE TRIAGE (OUTPATIENT)
Dept: ADMINISTRATIVE | Facility: CLINIC | Age: 68
End: 2018-10-08

## 2018-10-09 ENCOUNTER — ANESTHESIA EVENT (OUTPATIENT)
Dept: ENDOSCOPY | Facility: HOSPITAL | Age: 68
End: 2018-10-09
Payer: MEDICARE

## 2018-10-09 ENCOUNTER — HOSPITAL ENCOUNTER (OUTPATIENT)
Facility: HOSPITAL | Age: 68
Discharge: HOME | End: 2018-10-09
Attending: FAMILY MEDICINE | Admitting: FAMILY MEDICINE
Payer: MEDICARE

## 2018-10-09 ENCOUNTER — ANESTHESIA (OUTPATIENT)
Dept: ENDOSCOPY | Facility: HOSPITAL | Age: 68
End: 2018-10-09
Payer: MEDICARE

## 2018-10-09 DIAGNOSIS — K31.7 GASTRIC POLYPS: ICD-10-CM

## 2018-10-09 DIAGNOSIS — K29.70 GASTRITIS, PRESENCE OF BLEEDING UNSPECIFIED, UNSPECIFIED CHRONICITY, UNSPECIFIED GASTRITIS TYPE: ICD-10-CM

## 2018-10-09 DIAGNOSIS — K44.9 HIATAL HERNIA: ICD-10-CM

## 2018-10-09 DIAGNOSIS — D50.0 IRON DEFICIENCY ANEMIA DUE TO CHRONIC BLOOD LOSS: Primary | ICD-10-CM

## 2018-10-09 DIAGNOSIS — K22.2 SCHATZKI'S RING: ICD-10-CM

## 2018-10-09 LAB — POCT GLUCOSE: 254 MG/DL (ref 70–110)

## 2018-10-09 PROCEDURE — 45378 DIAGNOSTIC COLONOSCOPY: CPT | Mod: ,,, | Performed by: FAMILY MEDICINE

## 2018-10-09 PROCEDURE — 88305 TISSUE EXAM BY PATHOLOGIST: CPT | Mod: 26,,, | Performed by: PATHOLOGY

## 2018-10-09 PROCEDURE — 82962 GLUCOSE BLOOD TEST: CPT | Performed by: FAMILY MEDICINE

## 2018-10-09 PROCEDURE — 43239 EGD BIOPSY SINGLE/MULTIPLE: CPT | Mod: 51,,, | Performed by: FAMILY MEDICINE

## 2018-10-09 PROCEDURE — 27201012 HC FORCEPS, HOT/COLD, DISP: Performed by: FAMILY MEDICINE

## 2018-10-09 PROCEDURE — 25000003 PHARM REV CODE 250: Performed by: FAMILY MEDICINE

## 2018-10-09 PROCEDURE — 37000009 HC ANESTHESIA EA ADD 15 MINS: Performed by: FAMILY MEDICINE

## 2018-10-09 PROCEDURE — 43239 EGD BIOPSY SINGLE/MULTIPLE: CPT | Performed by: FAMILY MEDICINE

## 2018-10-09 PROCEDURE — 88305 TISSUE EXAM BY PATHOLOGIST: CPT | Mod: 59 | Performed by: PATHOLOGY

## 2018-10-09 PROCEDURE — 45378 DIAGNOSTIC COLONOSCOPY: CPT | Performed by: FAMILY MEDICINE

## 2018-10-09 PROCEDURE — 63600175 PHARM REV CODE 636 W HCPCS: Performed by: NURSE ANESTHETIST, CERTIFIED REGISTERED

## 2018-10-09 PROCEDURE — 37000008 HC ANESTHESIA 1ST 15 MINUTES: Performed by: FAMILY MEDICINE

## 2018-10-09 RX ORDER — SODIUM CHLORIDE, SODIUM LACTATE, POTASSIUM CHLORIDE, CALCIUM CHLORIDE 600; 310; 30; 20 MG/100ML; MG/100ML; MG/100ML; MG/100ML
INJECTION, SOLUTION INTRAVENOUS CONTINUOUS
Status: DISCONTINUED | OUTPATIENT
Start: 2018-10-09 | End: 2018-10-09 | Stop reason: HOSPADM

## 2018-10-09 RX ORDER — PROPOFOL 10 MG/ML
INJECTION, EMULSION INTRAVENOUS
Status: DISCONTINUED | OUTPATIENT
Start: 2018-10-09 | End: 2018-10-09

## 2018-10-09 RX ORDER — PANTOPRAZOLE SODIUM 40 MG/1
40 TABLET, DELAYED RELEASE ORAL DAILY
Qty: 30 TABLET | Refills: 11 | Status: SHIPPED | OUTPATIENT
Start: 2018-10-09 | End: 2019-10-17 | Stop reason: SDUPTHER

## 2018-10-09 RX ORDER — LIDOCAINE HCL/PF 100 MG/5ML
SYRINGE (ML) INTRAVENOUS
Status: DISCONTINUED | OUTPATIENT
Start: 2018-10-09 | End: 2018-10-09

## 2018-10-09 RX ORDER — SODIUM CHLORIDE 0.9 % (FLUSH) 0.9 %
3 SYRINGE (ML) INJECTION
Status: DISCONTINUED | OUTPATIENT
Start: 2018-10-09 | End: 2018-10-09 | Stop reason: HOSPADM

## 2018-10-09 RX ADMIN — LIDOCAINE HYDROCHLORIDE 100 MG: 20 INJECTION, SOLUTION INTRAVENOUS at 08:10

## 2018-10-09 RX ADMIN — SODIUM CHLORIDE, SODIUM LACTATE, POTASSIUM CHLORIDE, AND CALCIUM CHLORIDE: 600; 310; 30; 20 INJECTION, SOLUTION INTRAVENOUS at 08:10

## 2018-10-09 RX ADMIN — PROPOFOL 30 MG: 10 INJECTION, EMULSION INTRAVENOUS at 08:10

## 2018-10-09 RX ADMIN — PROPOFOL 140 MG: 10 INJECTION, EMULSION INTRAVENOUS at 08:10

## 2018-10-09 RX ADMIN — SODIUM CHLORIDE, SODIUM LACTATE, POTASSIUM CHLORIDE, AND CALCIUM CHLORIDE: 600; 310; 30; 20 INJECTION, SOLUTION INTRAVENOUS at 07:10

## 2018-10-09 RX ADMIN — PROPOFOL 40 MG: 10 INJECTION, EMULSION INTRAVENOUS at 08:10

## 2018-10-09 NOTE — ANESTHESIA POSTPROCEDURE EVALUATION
"Anesthesia Post Evaluation    Patient: Luda Jc    Procedure(s) Performed: Procedure(s) (LRB):  ESOPHAGOGASTRODUODENOSCOPY (EGD) (N/A)  COLONOSCOPY (N/A)    Final Anesthesia Type: MAC  Patient location during evaluation: PACU  Patient participation: Yes- Able to Participate  Level of consciousness: oriented and awake and alert  Post-procedure vital signs: reviewed and stable  Pain management: adequate  Airway patency: patent  PONV status at discharge: No PONV  Anesthetic complications: no      Cardiovascular status: blood pressure returned to baseline  Respiratory status: unassisted, room air and spontaneous ventilation  Hydration status: euvolemic  Follow-up not needed.        Visit Vitals  BP (!) 141/86 (BP Location: Left arm, Patient Position: Lying)   Pulse 98   Temp 36.6 °C (97.8 °F) (Oral)   Resp 16   Ht 4' 11" (1.499 m)   Wt 64 kg (141 lb)   SpO2 100%   Breastfeeding? No   BMI 28.48 kg/m²       Pain/Joslyn Score: Pain Assessment Performed: Yes (10/9/2018  7:18 AM)  Presence of Pain: denies (10/9/2018  8:44 AM)  Joslyn Score: 9 (10/9/2018  8:44 AM)        "

## 2018-10-09 NOTE — [employer] PATIENT INSTRUCTIONS
Discharge Summary/Instructions after an Endoscopic Procedure  Patient Name: Luda Jc  Patient MRN: [id number]  Patient YOB: 1950 Tuesday, October 09, 2018 Branden Carpenter MD  RESTRICTIONS:  During your procedure today, you received medications for sedation.  These   medications may affect your judgment, balance and coordination.  Therefore,   for 24 hours, you have the following restrictions:   - DO NOT drive a car, operate machinery, make legal/financial decisions,   sign important papers or drink alcohol.    ACTIVITY:  Today: no heavy lifting, straining or running due to procedural   sedation/anesthesia.  The following day: return to full activity including work.  DIET:  Eat and drink normally unless instructed otherwise.     TREATMENT FOR COMMON SIDE EFFECTS:  - Mild abdominal pain, nausea, belching, bloating or excessive gas:  rest,   eat lightly and use a heating pad.  - Sore Throat: treat with throat lozenges and/or gargle with warm salt   water.  - Because air was used during the procedure, expelling large amounts of air   from your rectum or belching is normal.  - If a bowel prep was taken, you may not have a bowel movement for 1-3 days.    This is normal.  SYMPTOMS TO WATCH FOR AND REPORT TO YOUR PHYSICIAN:  1. Abdominal pain or bloating, other than gas cramps.  2. Chest pain.  3. Back pain.  4. Signs of infection such as: chills or fever occurring within 24 hours   after the procedure.  5. Rectal bleeding, which would show as bright red, maroon, or black stools.   (A tablespoon of blood from the rectum is not serious, especially if   hemorrhoids are present.)  6. Vomiting.  7. Weakness or dizziness.  GO DIRECTLY TO THE NEAREST EMERGENCY ROOM IF YOU HAVE ANY OF THE FOLLOWING:      Difficulty breathing              Chills and/or fever over 101 F   Persistent vomiting and/or vomiting blood   Severe abdominal pain   Severe chest pain   Black, tarry stools   Bleeding- more than one  tablespoon   Any other symptom or condition that you feel may need urgent attention  Your doctor recommends these additional instructions:  If any biopsies were taken, your doctors clinic will contact you in 1 to 2   weeks with any results.  - Patient has a contact number available for emergencies.  The signs and   symptoms of potential delayed complications were discussed with the   patient.  Return to normal activities tomorrow.  Written discharge   instructions were provided to the patient.   - Resume previous diet.   - Discharge patient to home (via wheelchair).   - Use Protonix (pantoprazole) 40 mg PO daily daily.   - Stop nexium 20 mg a day.  - Return to GI clinic in 1 week.  Consider performing a Video capsule   endoscopy.  For questions, problems or results please call your physician Branden Carpenter MD at Work:  [phone]  If you have any questions about the above instructions, call the GI   department at [phone] or call the endoscopy unit at [phone]   from 7am until 3 pm.  [facility] [place], EMERGENCY ROOM PHONE NUMBER:   [phone]  IF A COMPLICATION OR EMERGENCY SITUATION ARISES AND YOU ARE UNABLE TO REACH   YOUR PHYSICIAN - GO DIRECTLY TO THE EMERGENCY ROOM.  I have read or have had read to me these discharge instructions for my   procedure and have received a written copy.  I understand these   instructions and will follow-up with my physician if I have any questions.     __________________________________       _____________________________________  Nurse Signature                                          Patient/Designated   Responsible Party Signature  Branden Carpenter MD  10/9/2018 8:47:08 AM  This report has been verified and signed electronically.  [employer]

## 2018-10-09 NOTE — TELEPHONE ENCOUNTER
Caregiver called to report the following:     -pt is scheduled for a cscope tomorrow am at 700... Has questions about rx yet to take  -patient states that I wont be able to make the hospital   -patient advised to  take prep as ordered   -patient verbalized understanding     Reason for Disposition   Caller has medication question only, adult not sick, and triager answers question    Protocols used: ST MEDICATION QUESTION CALL-A-AH

## 2018-10-09 NOTE — ANESTHESIA PREPROCEDURE EVALUATION
10/09/2018  Luad Jc is a 67 y.o., female.    Anesthesia Evaluation    I have reviewed the Patient Summary Reports.    I have reviewed the Nursing Notes.   I have reviewed the Medications.     Review of Systems  Anesthesia Hx:  No problems with previous Anesthesia    Social:  Non-Smoker, Social Alcohol Use    Hematology/Oncology:         -- Anemia: --  Cancer in past history:  Breast right surgery, chemotherapy and radiation  Oncology Comments: 2014 right breast cancer     EENT/Dental:EENT/Dental Normal   Cardiovascular:   Exercise tolerance: poor Hypertension, well controlled hyperlipidemia  Hypertension, Essential Hypertension    Pulmonary:   Snore   Renal/:   Chronic Renal Disease  Kidney Function/Disease, Chronic Kidney Disease (CKD)    Hepatic/GI:   Bowel Prep. Liver Disease, 0 Liver Disease, Abnormal Liver Enzymes, Fatty Liver    Musculoskeletal:  Musculoskeletal Normal    Neurological:  Neurology Normal    Endocrine:   Diabetes, well controlled, type 2, using insulin  Diabetes, Type 2 Diabetes , controlled by insulin.  Metabolic Disorders, Obesity / BMI > 30  Dermatological:  Skin Normal    Psych:  Psychiatric Normal           Physical Exam  General:  Obesity    Airway/Jaw/Neck:  Airway Findings: Mouth Opening: Normal Tongue: Normal  General Airway Assessment: Adult       Chest/Lungs:  Chest/Lungs Findings: Normal Respiratory Rate     Heart/Vascular:  Heart Findings: Rate: Normal             Anesthesia Plan  Type of Anesthesia, risks & benefits discussed:  Anesthesia Type:  MAC  Patient's Preference:   Intra-op Monitoring Plan: standard ASA monitors  Intra-op Monitoring Plan Comments:   Post Op Pain Control Plan:   Post Op Pain Control Plan Comments:   Induction:   IV  Beta Blocker:  Patient is not currently on a Beta-Blocker (No further documentation required).       Informed Consent: Patient  understands risks and agrees with Anesthesia plan.  Questions answered. Anesthesia consent signed with patient.  ASA Score: 3     Day of Surgery Review of History & Physical: I have interviewed and examined the patient. I have reviewed the patient's H&P dated: 10/09/18. There are no significant changes.  H&P update referred to the surgeon.         Ready For Surgery From Anesthesia Perspective.

## 2018-10-09 NOTE — H&P
Short Stay Endoscopy History and Physical    PCP - Leanne Bonilla, DO    Procedure - EGD and colonoscopy  ASA - 2  Mallampati - per anesthesia  History of Anesthesia problems - no  Family history Anesthesia problems -  no     HPI:  This is a 67 y.o. female here for evaluation of :   Active Hospital Problems    Diagnosis  POA    *Iron deficiency anemia due to chronic blood loss [D50.0]  Yes     She was recently admitted to [facility] for MARIAELENA and anemia. Her Hgb was 6.8 so she was transfused two units.  She complains of fatigue. She denies hematochezia, melena, hematemesis, vomiting, dysphagia, abdominal pain or change in bowel habits. She does report nausea, decrease in appetite and mild weight loss. She has a history heartburn which is controlled with Nexium. She takes ASA but denies other NSAID use. She reports having a normal colonoscopy before 2014. She said a ten year repeat was recommended. The patient said she was anemic once around 2014 when she was receiving chemotherapy for breast cancer.     Lab Results   Component Value Date    WBC 6.64 10/05/2018    HGB 10.3 (L) 10/05/2018    HCT 33.3 (L) 10/05/2018    MCV 83 10/05/2018     10/05/2018             Resolved Hospital Problems   No resolved problems to display.     She does report that she has had some voice changes and mild difficulty swallowing since she had her radiation and chemo.  She is hoarse a lot.  She has a lot of heartburn and she takes nexium for this.    Health Maintenance       Date Due Completion Date    Zoster Vaccine 12/17/2009 ---    Foot Exam 02/08/2019 2/8/2018    Override on 12/5/2016: Done    Override on 12/10/2015: Done    Override on 10/30/2013: Done    Hemoglobin A1c 02/08/2019 8/8/2018    DEXA SCAN 04/24/2019 4/24/2017    Override on 4/24/2017: Done    Eye Exam 05/22/2019 5/22/2018    Override on 4/26/2016: Done (Negstive Retinopathy-see scanned report -[facility] [place]-Dr Mitchell)    Override on 10/14/2014: Declined (pt. states is seeing  "outside opthalmology)    Lipid Panel 08/08/2019 8/8/2018    Urine Microalbumin 08/08/2019 8/8/2018    Colonoscopy 03/24/2026 3/24/2016    Override on 9/14/2011: Declined    TETANUS VACCINE 04/28/2027 4/28/2017 (ClinicallyNA)    Override on 4/28/2017: Not Clinically Appropriate          EGD  Reflux - no  Dysphagia - no  Abdominal pain - no  Diarrhea - no  Anemia - yes  GI bleeding - no  Other - no    Colonoscopy  Screening - no  History of polyps - no  Diarrhea - no  Anemia - yes  Blood in stools - no  Abdominal pain - no  Other - no    ROS:  CONSTITUTIONAL: Denies weight change,  fatigue, fevers, chills, night sweats.  CARDIOVASCULAR: Denies chest pain, shortness of breath, orthopnea and edema.  RESPIRATORY: Denies cough, hemoptysis, dyspnea, and wheezing.  GI: See HPI.    Medical History:   Past Medical History:   Diagnosis Date    Abnormal LFTs (liver function tests) 10/11/2014    Arthritis     Chronic kidney disease     Diabetes mellitus, type 2     Encounter for blood transfusion     Fatty liver     CT Abdomen/pelvis 6/24/2015---Fatty infiltration of the liver.    Hyperlipidemia     Hypertension     Insulin long-term use 10/11/2014    Nephrolithiasis     CT Abdomen/pelvis 6/24/2015---right nephrolithiasis.    Obesity     Stage II infiltrating ductal carcinoma of breast 6/16/2014    Followed by Dr. Virgen        Surgical History:   Past Surgical History:   Procedure Laterality Date    APPENDECTOMY      BREAST LUMPECTOMY Right 2014    TONSILLECTOMY, ADENOIDECTOMY         Family History:   Family History   Problem Relation Age of Onset    Cancer Mother         Stomach    Cancer Father         stomach    Heart disease Father     Stroke Son     Diabetes Paternal Aunt     Kidney disease Neg Hx        Social History:   Social History     Tobacco Use    Smoking status: Never Smoker    Smokeless tobacco: Never Used   Substance Use Topics    Alcohol use: Yes     Alcohol/week: 0.0 oz     Comment: " " "occassionally"    Drug use: No       Allergies:   Review of patient's allergies indicates:  No Known Allergies    Medications:   No current facility-administered medications on file prior to encounter.      Current Outpatient Medications on File Prior to Encounter   Medication Sig Dispense Refill    amLODIPine (NORVASC) 10 MG tablet TAKE 1 TABLET BY MOUTH ONCE DAILY 30 tablet 1    aspirin (ECOTRIN) 81 MG EC tablet Take 81 mg by mouth once daily.      atorvastatin (LIPITOR) 80 MG tablet TAKE ONE TABLET BY MOUTH ONCE DAILY 90 tablet 1    diphenoxylate-atropine 2.5-0.025 mg (LOMOTIL) 2.5-0.025 mg per tablet Take 2.5 tablets by mouth.      esomeprazole (NEXIUM) 20 MG capsule Take 20 mg by mouth before breakfast.      fenofibrate micronized (LOFIBRA) 200 MG Cap TAKE ONE CAPSULE BY MOUTH ONCE DAILY WITH BREAKFAST 30 capsule 6    glimepiride (AMARYL) 2 MG tablet TAKE ONE TABLET BY MOUTH ONCE DAILY WITH  BREAKFAST 60 tablet 6    insulin aspart U-100 (NOVOLOG FLEXPEN U-100 INSULIN) 100 unit/mL InPn pen INJECT 10 UNITS SUBCUTANEOUSLY THREE TIMES DAILY WITH MEALS 1 Box 6    insulin glargine (LANTUS SOLOSTAR) 100 unit/mL (3 mL) InPn pen INJECT 34 UNITS SUBCUTANEOUSLY IN THE MORNING AND 30 IN THE EVENING 2 Box 6    lisinopril (PRINIVIL,ZESTRIL) 40 MG tablet TAKE ONE TABLET BY MOUTH TWICE DAILY 60 tablet 6    metFORMIN (GLUCOPHAGE) 1000 MG tablet TAKE ONE TABLET BY MOUTH TWICE DAILY WITH MEALS 180 tablet 1    omega-3 fatty acids-vitamin E (FISH OIL) 1,000 mg Cap Take two capsules daily.  0    tamoxifen (NOLVADEX) 20 MG Tab       ACCU-CHEK SOFT DEV LANCETS Kit USE AS DIRECTED 1 each 0    blood sugar diagnostic Strp Glucose testing three times daily. 300 strip 3    blood-glucose meter kit Use as instructed 1 each 0    chlorthalidone (HYGROTEN) 50 MG Tab TAKE ONE TABLET BY MOUTH ONCE DAILY 30 tablet 6    cyclobenzaprine (FLEXERIL) 5 MG tablet Take 1 tablet (5 mg total) by mouth nightly. 30 tablet 0    lancets " "Misc Glucose testing three times daily. 300 each 3    metoprolol succinate (TOPROL-XL) 25 MG 24 hr tablet Take 1 tablet (25 mg total) by mouth once daily. 30 tablet 3    pen needle, diabetic (BD ULTRA-FINE FATEMEH PEN NEEDLES) 32 gauge x 5/32" Ndle Use once daily. 250.00 IDDM 100 each 3    triamcinolone acetonide 0.1% (KENALOG) 0.1 % cream Apply topically 3 (three) times daily. 15 g 3    [DISCONTINUED] hydrocodone-acetaminophen 10-325mg (NORCO)  mg Tab Take by mouth.      [DISCONTINUED] SUPREP BOWEL PREP KIT 17.5-3.13-1.6 gram SolR Use as directed 354 mL 0       Physical Exam:  Vital Signs:   Vitals:    10/09/18 0736   BP: (!) 143/72   Pulse: 101   Resp: 20   Temp: 97.8 °F (36.6 °C)     General Appearance: Well appearing in no acute distress  ENT: OP clear  Chest: CTA B  CV: RRR, no m/r/g  Abd: s/nt/nd/nabs  Ext: no edema    Labs:Reviewed    IMP:  Active Hospital Problems    Diagnosis  POA    *Iron deficiency anemia due to chronic blood loss [D50.0]  Yes     She was recently admitted to [facility] for MARIAELENA and anemia. Her Hgb was 6.8 so she was transfused two units.  She complains of fatigue. She denies hematochezia, melena, hematemesis, vomiting, dysphagia, abdominal pain or change in bowel habits. She does report nausea, decrease in appetite and mild weight loss. She has a history heartburn which is controlled with Nexium. She takes ASA but denies other NSAID use. She reports having a normal colonoscopy before 2014. She said a ten year repeat was recommended. The patient said she was anemic once around 2014 when she was receiving chemotherapy for breast cancer.     Lab Results   Component Value Date    WBC 6.64 10/05/2018    HGB 10.3 (L) 10/05/2018    HCT 33.3 (L) 10/05/2018    MCV 83 10/05/2018     10/05/2018             Resolved Hospital Problems   No resolved problems to display.         Plan:   I have explained the risks and benefits of upper endoscopy and colonoscopy to the patient including but not " limited to bleeding, perforation, infection, and death. The patient wishes to proceed.

## 2018-10-09 NOTE — DISCHARGE SUMMARY
Endoscopy Discharge Summary      Admit Date: 10/9/2018    Discharge Date and Time:  10/9/2018 8:50 AM    Attending Physician: Branden Carpenter MD     Discharge Physician: Branden Carpenter MD     Principal Admitting Diagnoses: Iron deficiency anemia due to chronic blood loss         Discharge Diagnosis: The encounter diagnosis was Iron deficiency anemia due to chronic blood loss.     Discharged Condition: Good    Indication for Admission: Iron deficiency anemia due to chronic blood loss     Hospital Course: Patient was admitted for an inpatient procedure and tolerated the procedure well with no complications.    Significant Diagnostic Studies: EGD with cold biopsy and Colonoscopy    Pathology (if any):  Specimen (12h ago, onward)    Start     Ordered    10/09/18 0818  Specimen to Pathology - Surgery  Once     Comments:  #1 small bowel bx r/o celiac#2 antrum gastritis bx r/o h.pylori#3 body of stomach polyp bx#4 fundus polyp bx     Start Status   10/09/18 0818 Collected (10/09/18 0841)       10/09/18 0841          Estimated Blood Loss: 1 ml.    Discussed with: patient and family.    Disposition: Home.    Follow Up/Patient Instructions:   Current Discharge Medication List      START taking these medications    Details   pantoprazole (PROTONIX) 40 MG tablet Take 1 tablet (40 mg total) by mouth once daily.  Qty: 30 tablet, Refills: 11         CONTINUE these medications which have NOT CHANGED    Details   amLODIPine (NORVASC) 10 MG tablet TAKE 1 TABLET BY MOUTH ONCE DAILY  Qty: 30 tablet, Refills: 1    Comments: Please consider 90 day supplies to promote better adherence      atorvastatin (LIPITOR) 80 MG tablet TAKE ONE TABLET BY MOUTH ONCE DAILY  Qty: 90 tablet, Refills: 1      diphenoxylate-atropine 2.5-0.025 mg (LOMOTIL) 2.5-0.025 mg per tablet Take 2.5 tablets by mouth.      fenofibrate micronized (LOFIBRA) 200 MG Cap TAKE ONE CAPSULE BY MOUTH ONCE DAILY WITH BREAKFAST  Qty: 30 capsule, Refills: 6    Comments: Please  consider 90 day supplies to promote better adherence      glimepiride (AMARYL) 2 MG tablet TAKE ONE TABLET BY MOUTH ONCE DAILY WITH  BREAKFAST  Qty: 60 tablet, Refills: 6    Comments: Please consider 90 day supplies to promote better adherence      insulin aspart U-100 (NOVOLOG FLEXPEN U-100 INSULIN) 100 unit/mL InPn pen INJECT 10 UNITS SUBCUTANEOUSLY THREE TIMES DAILY WITH MEALS  Qty: 1 Box, Refills: 6    Comments: Please consider 90 day supplies to promote better adherence  Associated Diagnoses: Diabetes mellitus with microalbuminuria; Long-term insulin use      insulin glargine (LANTUS SOLOSTAR) 100 unit/mL (3 mL) InPn pen INJECT 34 UNITS SUBCUTANEOUSLY IN THE MORNING AND 30 IN THE EVENING  Qty: 2 Box, Refills: 6    Comments: Please consider 90 day supplies to promote better adherence  Associated Diagnoses: Hypertension associated with diabetes; Combined hyperlipidemia associated with type 2 diabetes mellitus; Type 2 diabetes mellitus with stage 3 chronic kidney disease, with long-term current use of insulin; Diabetes mellitus with microalbuminuria      lisinopril (PRINIVIL,ZESTRIL) 40 MG tablet TAKE ONE TABLET BY MOUTH TWICE DAILY  Qty: 60 tablet, Refills: 6    Comments: Please consider 90 day supplies to promote better adherence  Associated Diagnoses: Hypertension goal BP (blood pressure) < 130/80; Uncontrolled type 2 diabetes mellitus with stage 3 chronic kidney disease, with long-term current use of insulin; Uncontrolled type 2 diabetes mellitus with microalbuminuria, with long-term current use of insulin      metFORMIN (GLUCOPHAGE) 1000 MG tablet TAKE ONE TABLET BY MOUTH TWICE DAILY WITH MEALS  Qty: 180 tablet, Refills: 1      omega-3 fatty acids-vitamin E (FISH OIL) 1,000 mg Cap Take two capsules daily.  Refills: 0    Associated Diagnoses: Hypertriglyceridemia      tamoxifen (NOLVADEX) 20 MG Tab       ACCU-CHEK SOFT DEV LANCETS Kit USE AS DIRECTED  Qty: 1 each, Refills: 0      blood sugar diagnostic Strp  "Glucose testing three times daily.  Qty: 300 strip, Refills: 3    Comments: Please dispense brand covered by insurance.      blood-glucose meter kit Use as instructed  Qty: 1 each, Refills: 0    Comments: Please dispense brand covered by insurance.      chlorthalidone (HYGROTEN) 50 MG Tab TAKE ONE TABLET BY MOUTH ONCE DAILY  Qty: 30 tablet, Refills: 6    Comments: Please consider 90 day supplies to promote better adherence      cyclobenzaprine (FLEXERIL) 5 MG tablet Take 1 tablet (5 mg total) by mouth nightly.  Qty: 30 tablet, Refills: 0    Associated Diagnoses: Musculoskeletal pain      lancets Misc Glucose testing three times daily.  Qty: 300 each, Refills: 3    Comments: Please dispense brand covered by insurance.      metoprolol succinate (TOPROL-XL) 25 MG 24 hr tablet Take 1 tablet (25 mg total) by mouth once daily.  Qty: 30 tablet, Refills: 3    Associated Diagnoses: Hypertension goal BP (blood pressure) < 130/80      pen needle, diabetic (BD ULTRA-FINE FATEMEH PEN NEEDLES) 32 gauge x 5/32" Ndle Use once daily. 250.00 IDDM  Qty: 100 each, Refills: 3    Associated Diagnoses: Uncontrolled type 2 diabetes mellitus with stage 3 chronic kidney disease, unspecified long term insulin use status      triamcinolone acetonide 0.1% (KENALOG) 0.1 % cream Apply topically 3 (three) times daily.  Qty: 15 g, Refills: 3         STOP taking these medications       aspirin (ECOTRIN) 81 MG EC tablet Comments:   Reason for Stopping:         esomeprazole (NEXIUM) 20 MG capsule Comments:   Reason for Stopping:               No discharge procedures on file.    Follow-up Information     Branden Carpenter MD. Call in 1 week.    Specialty:  Family Medicine  Why:  To receive pathology results.  Contact information:  [address] [facility] [address] [phone]             Mesfin Snow PA-C. Schedule an appointment as soon as possible for a visit in 1 week.    Specialty:  Gastroenterology  Contact information:  [address] " [place] [address]  [phone]

## 2018-10-09 NOTE — ANESTHESIA RELEASE NOTE
"Anesthesia Release from PACU Note    Patient: Luda Jc    Procedure(s) Performed: Procedure(s) (LRB):  ESOPHAGOGASTRODUODENOSCOPY (EGD) (N/A)  COLONOSCOPY (N/A)    Anesthesia type: MAC    Post pain: Adequate analgesia    Post assessment: no apparent anesthetic complications, tolerated procedure well and no evidence of recall    Last Vitals:   Visit Vitals  BP (!) 141/86 (BP Location: Left arm, Patient Position: Lying)   Pulse 98   Temp 36.6 °C (97.8 °F) (Oral)   Resp 16   Ht 4' 11" (1.499 m)   Wt 64 kg (141 lb)   SpO2 100%   Breastfeeding? No   BMI 28.48 kg/m²       Post vital signs: stable    Level of consciousness: awake and alert     Nausea/Vomiting: no nausea/no vomiting    Complications: none    Airway Patency: patent    Respiratory: unassisted, spontaneous ventilation, nasal cannula    Cardiovascular: stable    Hydration: euvolemic  "

## 2018-10-09 NOTE — DISCHARGE INSTRUCTIONS
Hemorrhoids    Hemorrhoids are swollen and inflamed veins inside the rectum and near the anus. The rectum is the last several inches of the colon. The anus is the passage between the rectum and the outside of the body.  Causes  The veins can become swollen due to increased pressure in them. This is most often caused by:  · Chronic constipation or diarrhea  · Straining when having a bowel movement  · Sitting too long on the toilet  · A low-fiber diet  · Pregnancy  Symptoms  · Bleeding from the rectum (this may be noticeable after bowel movements)  · Lump near the anus  · Itching around the anus  · Pain around the anus  There are different types of hemorrhoids. Depending on the type you have and the severity, you may be able to treat yourself at home. In some cases, a procedure may be the best treatment option. Your healthcare provider can tell you more about this, if needed.  Home care  General care  · To get relief from pain or itching, try:  ¨ Topical products. Your healthcare provider may prescribe or recommend creams, ointments, or pads that can be applied to the hemorrhoid. Use these exactly as directed.  ¨ Medicines. Your healthcare provider may recommend stool softeners, suppositories, or laxatives to help manage constipation. Use these exactly as directed.  ¨ Sitz baths. A sitz bath involves sitting in a few inches of warm bath water. Be careful not to make the water so hot that you burn yourself--test it before sitting in it. Soak for about 10 to 15 minutes a few times a day. This may help relieve pain.  Tips to help prevent hemorrhoids  · Eat more fiber. Fiber adds bulk to stool and absorbs water as it moves through your colon. This makes stool softer and easier to pass.  ¨ Increase the fiber in your diet with more fiber-rich foods. These include fresh fruit, vegetables, and whole grains.  ¨ Take a fiber supplement or bulking agent, if advised to by your provider. These include products such as psyllium  or methylcellulose.  · Drink plenty of water, if directed to by your provider. This can help keep stool soft.  · Be more active. Frequent exercise aids digestion and helps prevent constipation. It may also help make bowel movements more regular.  · Dont strain during bowel movements. This can make hemorrhoids more likely. Also, dont sit on the toilet for long periods of time.  Follow-up care  Follow up with your healthcare provider, or as advised. If a culture or imaging tests were done, you will be notified of the results when they are ready. This may take a few days or longer.  When to seek medical advice  Call your healthcare provider right away if any of these occur:  · Increased bleeding from the rectum  · Increased pain around the rectum or anus  · Weakness or dizziness  Call 911  Call 911 or return to the emergency department right away if any of these occur:  · Trouble breathing or swallowing  · Fainting or loss of consciousness  · Unusually fast heart rate  · Vomiting blood  · Large amounts of blood in stool  Date Last Reviewed: 6/22/2015 © 2000-2017 [employer]. [address] [place] [address]. All rights reserved. This information is not intended as a substitute for professional medical care. Always follow your healthcare professional's instructions.        Diverticulosis    Diverticulosis means that small pouches have formed in the wall of your large intestine (colon). Most often, this problem causes no symptoms and is common as people age. But the pouches in the colon are at risk of becoming infected. When this happens, the condition is called diverticulitis. Although most people with diverticulosis never develop diverticulitis, it is still not uncommon. Rectal bleeding can also occur and in less common situations, a type of colon inflammation called colitis.  While most people do not have symptoms, some people with diverticulosis may have:  · Abdominal cramps and  pain  · Bloating  · Constipation  · Change in bowel habits  Causes  The exact cause of diverticulosis (and diverticulitis) has not been proved, but a few things are associated with the condition:  · Low-fiber diet  · Constipation  · Lack of exercise  Your healthcare provider will talk with you about how to manage your condition. Diet changes may be all that are needed to help control diverticulosis and prevent progression to diverticulitis. If you develop diverticulitis, you will likely need other treatments.  Home care  You may be told to take fiber supplements daily. Fiber adds bulk to the stool so that it passes through the colon more easily. Stool softeners may be recommended. You may also be given medications for pain relief. Be sure to take all medications as directed.  In the past, people were told to avoid corn, nuts, and seeds. This is no longer necessary.  Follow these guidelines when caring for yourself at home:  · Eat unprocessed foods that are high in fiber. Whole grains, fruits, and vegetables are good choices.  · Drink 6 to 8 glasses of water every day unless your healthcare provider has you limit how much fluid you should have.  · Watch for changes in your bowel movements. Tell your provider if you notice any changes.  · Begin an exercise program. Ask your provider how to get started. Generally, walking is the best.  · Get plenty of rest and sleep.  Follow-up care  Follow up with your healthcare provider, or as advised. Regular visits may be needed to check on your health. Sometimes special procedures such as colonoscopy, are needed after an episode of diverticulitis or blooding. Be sure to keep all your appointments.  If a stool sample was taken, or cultures were done, you should be told if they are positive, or if your treatment needs to be changed. You can call as directed for the results.  If X-rays were done, a radiologist will look at them. You will be told if there is a change in your  treatment.  If antibiotics were prescribed, be sure to finish them all.  When to seek medical advice  Call your healthcare provider right away if any of these occur:  · Fever of 100.4°F (38°C) or higher, or as directed by your healthcare provider  · Severe cramps in the lower left side of the abdomen or pain that is getting worse  · Tenderness in the lower left side of the abdomen or worsening pain throughout the abdomen  · Diarrhea or constipation that doesn't get better within 24 hours  · Nausea and vomiting  · Bleeding from the rectum  Call 911  Call emergency services if any of the following occur:  · Trouble breathing  · Confusion  · Very drowsy or trouble awakening  · Fainting or loss of consciousness  · Rapid heart rate  · Chest pain  Date Last Reviewed: 12/30/2015 © 2000-2017 [employer]. [address] [place] [address], [place] [address]. All rights reserved. This information is not intended as a substitute for professional medical care. Always follow your healthcare professional's instructions.        Gastritis (Adult)    Gastritis is inflammation and irritation of the stomach lining. It can be present for a short time (acute) or be long lasting (chronic). Gastritis is often caused by infection with bacteria called H pylori. More than a third of people in the [place] have this bacteria in their bodies. In many cases, H pylori causes no problems or symptoms. In some people, though, the infection irritates the stomach lining and causes gastritis. Other causes of stomach irritation include drinking alcohol or taking pain-relieving medicines called NSAIDs (such as aspirin or ibuprofen).   Symptoms of gastritis can include:  · Abdominal pain or bloating  · Loss of appetite  · Nausea or vomiting  · Vomiting blood or having black stools  · Feeling more tired than usual  An inflamed and irritated stomach lining is more likely to develop a sore called an ulcer. To help prevent this, gastritis should be  treated.  Home care  If needed, medicines may be prescribed. If you have H pylori infection, treating it will likely relieve your symptoms. Other changes can help reduce stomach irritation and help it heal.  · If you have been prescribed medicines for H pylori infection, take them as directed. Take all of the medicine until it is finished or your healthcare provider tells you to stop, even if you feel better.  · Your healthcare provider may recommend avoiding NSAIDs. If you take daily aspirin for your heart or other medical reasons, do not stop without talking to your healthcare provider first.  · Avoid drinking alcohol.  · Stop smoking. Smoking can irritate the stomach and delay healing. As much as possible, stay away from second hand smoke.  Follow-up care  Follow up with your healthcare provider, or as advised by our staff. Testing may be needed to check for inflammation or an ulcer.  When to seek medical advice  Call your healthcare provider for any of the following:  · Stomach pain that gets worse or moves to the lower right abdomen (appendix area)  · Chest pain that appears or gets worse, or spreads to the back, neck, shoulder, or arm  · Frequent vomiting (cant keep down liquids)  · Blood in the stool or vomit (red or black in color)  · Feeling weak or dizzy  · Fever of 100.4ºF (38ºC) or higher, or as directed by your healthcare provider  Date Last Reviewed: 6/22/2015  © 3413-1722 [employer]. [address] [place] [address], [place] [address]. All rights reserved. This information is not intended as a substitute for professional medical care. Always follow your healthcare professional's instructions.        What Is a Hiatal Hernia?    Hiatal hernia is when the area where the stomach and esophagus meet bulges up through the diaphragm into the chest cavity. In some cases, part of the stomach may bulge above the diaphragm. Stomach acid may move up into the esophagus and cause symptoms. The symptoms are  often blamed on gastroesophageal reflux disease (GERD). You may only know about the hernia when it shows up on an X-ray taken for other reasons.   What you may feel  The hiatus is a normal hole in the diaphragm. The esophagus passes through this hole and leads to the stomach. In some cases, part of the stomach may bulge above the diaphragm. This bulge is called a hernia. Stomach acid may move up into the esophagus and cause symptoms.  When you eat, the muscle at the hiatus relaxes to allow food to pass into the stomach. It tightens again to keep food and digestive acids in the stomach.  Many people with hiatal hernias have mild symptoms. You may notice the following GERD symptoms:  · Heartburn or other chest discomfort  · A feeling of chest fullness after a meal  · Frequent burping  · Acid taste in the mouth  · Trouble swallowing  Treating symptoms  If you have been diagnosed with hiatal hernia, these suggestions may help improve symptoms:  · Lose excess weight. Extra weight puts pressure on the stomach and esophagus.  · Dont lie down after eating. Sit up for at least an hour after eating. Lying down after eating can increase symptoms.  · Avoid certain foods and drinks. These include fatty foods, chocolate, coffee, mint, and other foods that cause symptoms for you.  · Dont smoke or drink alcohol. These can worsen symptoms.  · Look at your medicines. Discuss your medicines with your healthcare provider. Many medicines can cause symptoms.  · Consider an antacid medicine. Ask your healthcare provider about over-the-counter and prescription medicines that may help.  · Ask about surgery, if needed. Surgery is a treatment choice for some people. Your healthcare provider can determine if surgery is an option for you.    Date Last Reviewed: 10/1/2016  © 3646-0023 [employer]. [address] [place] [address], [place] [address]. All rights reserved. This information is not intended as a substitute for professional  medical care. Always follow your healthcare professional's instructions.

## 2018-10-09 NOTE — [employer] PATIENT INSTRUCTIONS
Discharge Summary/Instructions after an Endoscopic Procedure  Patient Name: Luda Jc  Patient MRN: [id number]  Patient YOB: 1950 Tuesday, October 09, 2018 Branden Carpenter MD  RESTRICTIONS:  During your procedure today, you received medications for sedation.  These   medications may affect your judgment, balance and coordination.  Therefore,   for 24 hours, you have the following restrictions:   - DO NOT drive a car, operate machinery, make legal/financial decisions,   sign important papers or drink alcohol.    ACTIVITY:  Today: no heavy lifting, straining or running due to procedural   sedation/anesthesia.  The following day: return to full activity including work.  DIET:  Eat and drink normally unless instructed otherwise.     TREATMENT FOR COMMON SIDE EFFECTS:  - Mild abdominal pain, nausea, belching, bloating or excessive gas:  rest,   eat lightly and use a heating pad.  - Sore Throat: treat with throat lozenges and/or gargle with warm salt   water.  - Because air was used during the procedure, expelling large amounts of air   from your rectum or belching is normal.  - If a bowel prep was taken, you may not have a bowel movement for 1-3 days.    This is normal.  SYMPTOMS TO WATCH FOR AND REPORT TO YOUR PHYSICIAN:  1. Abdominal pain or bloating, other than gas cramps.  2. Chest pain.  3. Back pain.  4. Signs of infection such as: chills or fever occurring within 24 hours   after the procedure.  5. Rectal bleeding, which would show as bright red, maroon, or black stools.   (A tablespoon of blood from the rectum is not serious, especially if   hemorrhoids are present.)  6. Vomiting.  7. Weakness or dizziness.  GO DIRECTLY TO THE NEAREST EMERGENCY ROOM IF YOU HAVE ANY OF THE FOLLOWING:      Difficulty breathing              Chills and/or fever over 101 F   Persistent vomiting and/or vomiting blood   Severe abdominal pain   Severe chest pain   Black, tarry stools   Bleeding- more than one  tablespoon   Any other symptom or condition that you feel may need urgent attention  Your doctor recommends these additional instructions:  If any biopsies were taken, your doctors clinic will contact you in 1 to 2   weeks with any results.  - Patient has a contact number available for emergencies.  The signs and   symptoms of potential delayed complications were discussed with the   patient.  Return to normal activities tomorrow.  Written discharge   instructions were provided to the patient.   - Resume previous diet.   - Continue present medications.   - No repeat colonoscopy due to current age (66 years or older).   - Return to GI clinic in 1 week.   - To visualize the small bowel, perform video capsule endoscopy.   - Discharge patient to home (via wheelchair).  For questions, problems or results please call your physician Branden Carpenter MD at Work:  [phone]  If you have any questions about the above instructions, call the GI   department at [phone] or call the endoscopy unit at [phone]   from 7am until 3 pm.  [facility], EMERGENCY ROOM PHONE NUMBER:   [phone]  IF A COMPLICATION OR EMERGENCY SITUATION ARISES AND YOU ARE UNABLE TO REACH   YOUR PHYSICIAN - GO DIRECTLY TO THE EMERGENCY ROOM.  I have read or have had read to me these discharge instructions for my   procedure and have received a written copy.  I understand these   instructions and will follow-up with my physician if I have any questions.     __________________________________       _____________________________________  Nurse Signature                                          Patient/Designated   Responsible Party Signature  Branden Carpenter MD  10/9/2018 8:42:34 AM  This report has been verified and signed electronically.  [employer]

## 2018-10-09 NOTE — TRANSFER OF CARE
"Anesthesia Transfer of Care Note    Patient: Luda Jc    Procedure(s) Performed: Procedure(s) (LRB):  ESOPHAGOGASTRODUODENOSCOPY (EGD) (N/A)  COLONOSCOPY (N/A)    Patient location: PACU    Anesthesia Type: MAC    Transport from OR: Transported from OR on room air with adequate spontaneous ventilation    Post pain: adequate analgesia    Post assessment: no apparent anesthetic complications    Post vital signs: stable    Level of consciousness: awake    Nausea/Vomiting: no nausea/vomiting    Complications: none    Transfer of care protocol was followed      Last vitals:   Visit Vitals  BP (!) 141/86 (BP Location: Left arm, Patient Position: Lying)   Pulse 98   Temp 36.6 °C (97.8 °F) (Oral)   Resp 16   Ht 4' 11" (1.499 m)   Wt 64 kg (141 lb)   SpO2 100%   Breastfeeding? No   BMI 28.48 kg/m²     "

## 2018-10-10 VITALS
DIASTOLIC BLOOD PRESSURE: 84 MMHG | SYSTOLIC BLOOD PRESSURE: 149 MMHG | OXYGEN SATURATION: 96 % | HEART RATE: 88 BPM | RESPIRATION RATE: 16 BRPM | TEMPERATURE: 98 F | WEIGHT: 141 LBS | HEIGHT: 59 IN | BODY MASS INDEX: 28.43 KG/M2

## 2018-10-12 ENCOUNTER — PATIENT MESSAGE (OUTPATIENT)
Dept: INTERNAL MEDICINE | Facility: CLINIC | Age: 68
End: 2018-10-12

## 2018-10-16 NOTE — PROGRESS NOTES
The biopsies were negative for signs of celiac disease and significant changes in the stomach.  I advise that a follow up be arranged with one of the providers in the GI clinic in [place] so that a pill endoscopy can be arranged if possible to look for sources of anemia.

## 2018-10-17 ENCOUNTER — TELEPHONE (OUTPATIENT)
Dept: INTERNAL MEDICINE | Facility: CLINIC | Age: 68
End: 2018-10-17

## 2018-10-17 NOTE — TELEPHONE ENCOUNTER
"Pt stopped by here this morning to drop off a letter from Dr. Jg Salinas of [facility] Phone # [phone] Fax #[phone]. The letter is asking for a surgical clearance.  I then called Dr. Salinas's office to let them know that we needed orders for whatever test that he needed so that Dr. Bonilla could clear the pt. Dr. Salinas came on the line and stated, " I will send you the results what labs we have drawn this morning but anything else Dr. Bonilla needs for a clearance she needs to order."  I will schedule a preop for the pt once we receive the results from Dr. Salinass office.    "

## 2018-10-18 RX ORDER — AMLODIPINE BESYLATE 10 MG/1
TABLET ORAL
Qty: 90 TABLET | Refills: 1 | Status: SHIPPED | OUTPATIENT
Start: 2018-10-18 | End: 2019-05-13 | Stop reason: SDUPTHER

## 2018-10-19 NOTE — TELEPHONE ENCOUNTER
----- Message from Lexi Aviles sent at 10/19/2018 10:29 AM CDT -----  Contact: spouse   Requesting a call back regarding paperwork for pre op. Please call back at [phone].    Thanks,  Lexi Aviles

## 2018-10-24 ENCOUNTER — HOSPITAL ENCOUNTER (OUTPATIENT)
Dept: RADIOLOGY | Facility: HOSPITAL | Age: 68
Discharge: HOME | End: 2018-10-24
Attending: INTERNAL MEDICINE
Payer: MEDICARE

## 2018-10-24 ENCOUNTER — OFFICE VISIT (OUTPATIENT)
Dept: INTERNAL MEDICINE | Facility: CLINIC | Age: 68
End: 2018-10-24
Payer: MEDICARE

## 2018-10-24 VITALS
HEART RATE: 76 BPM | HEIGHT: 59 IN | DIASTOLIC BLOOD PRESSURE: 75 MMHG | SYSTOLIC BLOOD PRESSURE: 115 MMHG | BODY MASS INDEX: 30.35 KG/M2 | WEIGHT: 150.56 LBS

## 2018-10-24 DIAGNOSIS — I10 HYPERTENSION GOAL BP (BLOOD PRESSURE) < 130/80: Chronic | ICD-10-CM

## 2018-10-24 DIAGNOSIS — Z01.818 PREOPERATIVE EXAMINATION: ICD-10-CM

## 2018-10-24 DIAGNOSIS — Z01.818 PREOPERATIVE EXAMINATION: Primary | ICD-10-CM

## 2018-10-24 PROCEDURE — 99999 PR PBB SHADOW E&M-EST. PATIENT-LVL III: CPT | Mod: PBBFAC,,, | Performed by: INTERNAL MEDICINE

## 2018-10-24 PROCEDURE — 71046 X-RAY EXAM CHEST 2 VIEWS: CPT | Mod: 26,,, | Performed by: RADIOLOGY

## 2018-10-24 PROCEDURE — 71046 X-RAY EXAM CHEST 2 VIEWS: CPT | Mod: TC

## 2018-10-24 PROCEDURE — 3074F SYST BP LT 130 MM HG: CPT | Mod: CPTII,,, | Performed by: INTERNAL MEDICINE

## 2018-10-24 PROCEDURE — 3078F DIAST BP <80 MM HG: CPT | Mod: CPTII,,, | Performed by: INTERNAL MEDICINE

## 2018-10-24 PROCEDURE — 99213 OFFICE O/P EST LOW 20 MIN: CPT | Mod: PBBFAC,25 | Performed by: INTERNAL MEDICINE

## 2018-10-24 PROCEDURE — 1101F PT FALLS ASSESS-DOCD LE1/YR: CPT | Mod: CPTII,,, | Performed by: INTERNAL MEDICINE

## 2018-10-24 PROCEDURE — 99214 OFFICE O/P EST MOD 30 MIN: CPT | Mod: S$PBB,,, | Performed by: INTERNAL MEDICINE

## 2018-10-24 PROCEDURE — 3044F HG A1C LEVEL LT 7.0%: CPT | Mod: CPTII,,, | Performed by: INTERNAL MEDICINE

## 2018-10-24 NOTE — PROGRESS NOTES
Subjective:       Patient ID: Luda Jc is a 67 y.o. female.    Chief Complaint: Pre-op Exam    Luda Jc  67 y.o. White female     Patient presents with:  Pre-op Exam    HPI: Here today for a preoperative evaluation. She was recently diagnosed with left breast cancer and will require surgery (Dr. Jg Salinas). A date has not been scheduled.   She recently had labs and an EKG. Her EKG did not show any acute findings.   She denies prior history of surgical or anesthetic complications.  HTN--stable on lisinopril, metoprolol, chlorthalidone and amlodipine. She denies symptoms.   Diabetes--stable on oral medication and insulin.                     HGBA1C                   6.8 (H)             08/08/2018            CKD III--stable. She is asymptomatic.     Recent labs:                    WBC                      6.64                10/05/2018                 HGB                      10.3 (L)            10/05/2018                 HCT                      33.3 (L)            10/05/2018                 MCV                      83                  10/05/2018                 PLT                      179                 10/05/2018                       NA                       137                 10/05/2018                 K                        4.0                 10/05/2018                 CL                       103                 10/05/2018                 CO2                      22 (L)              10/05/2018                 BUN                      17                  10/05/2018                 CREATININE               1.1                 10/05/2018                 CALCIUM                  8.6 (L)             10/05/2018                 ANIONGAP                 12                  10/05/2018                      EGFRNONAA                52 (A)              10/05/2018                        Past Medical History:  10/11/2014: Abnormal LFTs (liver function tests)  Arthritis  Chronic kidney disease  Diabetes  mellitus, type 2  Encounter for blood transfusion  Fatty liver      Comment:  CT Abdomen/pelvis 6/24/2015---Fatty infiltration of the                liver.  Hyperlipidemia  Hypertension  10/11/2014: Insulin long-term use  Nephrolithiasis      Comment:  CT Abdomen/pelvis 6/24/2015---right nephrolithiasis.  Obesity  6/16/2014: Stage II infiltrating ductal carcinoma of breast      Comment:  Followed by Dr. Virgen     Past Surgical History:  APPENDECTOMY  2014: BREAST LUMPECTOMY; Right  10/9/2018: COLONOSCOPY; N/A      Comment:  Procedure: COLONOSCOPY;  Surgeon: Branden Carpenter MD;                 Location: [facility];  Service: Endoscopy;  Laterality:                N/A;  10/9/2018: COLONOSCOPY; N/A      Comment:  Performed by Branden Carpenter MD at [facility]  10/9/2018: ESOPHAGOGASTRODUODENOSCOPY; N/A      Comment:  Procedure: ESOPHAGOGASTRODUODENOSCOPY (EGD);  Surgeon:                Branden Carpenter MD;  Location: [facility];  Service:                Endoscopy;  Laterality: N/A;  10/9/2018: ESOPHAGOGASTRODUODENOSCOPY (EGD); N/A      Comment:  Performed by Branden Carpenter MD at [facility]  TONSILLECTOMY, ADENOIDECTOMY    Review of patient's family history indicates:  Problem: Cancer      Relation: Mother          Age of Onset: (Not Specified)          Comment: Stomach  Problem: Cancer      Relation: Father          Age of Onset: (Not Specified)          Comment: stomach  Problem: Heart disease      Relation: Father          Age of Onset: (Not Specified)  Problem: Stroke      Relation: Son          Age of Onset: (Not Specified)  Problem: Diabetes      Relation: Paternal Aunt          Age of Onset: (Not Specified)  Problem: Kidney disease      Relation: Neg Hx          Age of Onset: (Not Specified)      Current Outpatient Medications on File Prior to Visit:  ACCU-CHEK SOFT DEV LANCETS Kit, USE AS DIRECTED, Disp: 1 each, Rfl: 0  amLODIPine (NORVASC) 10 MG tablet, TAKE 1 TABLET BY MOUTH ONCE DAILY, Disp: 90 tablet, Rfl:  "1  atorvastatin (LIPITOR) 80 MG tablet, TAKE ONE TABLET BY MOUTH ONCE DAILY, Disp: 90 tablet, Rfl: 1  blood sugar diagnostic Strp, Glucose testing three times daily., Disp: 300 strip, Rfl: 3  blood-glucose meter kit, Use as instructed, Disp: 1 each, Rfl: 0  chlorthalidone (HYGROTEN) 50 MG Tab, TAKE ONE TABLET BY MOUTH ONCE DAILY, Disp: 30 tablet, Rfl: 6  diphenoxylate-atropine 2.5-0.025 mg (LOMOTIL) 2.5-0.025 mg per tablet, Take 2.5 tablets by mouth., Disp: , Rfl:   fenofibrate micronized (LOFIBRA) 200 MG Cap, TAKE ONE CAPSULE BY MOUTH ONCE DAILY WITH BREAKFAST, Disp: 30 capsule, Rfl: 6  insulin aspart U-100 (NOVOLOG FLEXPEN U-100 INSULIN) 100 unit/mL InPn pen, INJECT 10 UNITS SUBCUTANEOUSLY THREE TIMES DAILY WITH MEALS, Disp: 1 Box, Rfl: 6  insulin glargine (LANTUS SOLOSTAR) 100 unit/mL (3 mL) InPn pen, INJECT 34 UNITS SUBCUTANEOUSLY IN THE MORNING AND 30 IN THE EVENING, Disp: 2 Box, Rfl: 6  lancets Misc, Glucose testing three times daily., Disp: 300 each, Rfl: 3  lisinopril (PRINIVIL,ZESTRIL) 40 MG tablet, TAKE ONE TABLET BY MOUTH TWICE DAILY, Disp: 60 tablet, Rfl: 6  metFORMIN (GLUCOPHAGE) 1000 MG tablet, TAKE ONE TABLET BY MOUTH TWICE DAILY WITH MEALS, Disp: 180 tablet, Rfl: 1  metoprolol succinate (TOPROL-XL) 25 MG 24 hr tablet, Take 1 tablet (25 mg total) by mouth once daily., Disp: 30 tablet, Rfl: 3  tamoxifen (NOLVADEX) 20 MG Tab, , Disp: , Rfl:   cyclobenzaprine (FLEXERIL) 5 MG tablet, Take 1 tablet (5 mg total) by mouth nightly., Disp: 30 tablet, Rfl: 0  glimepiride (AMARYL) 2 MG tablet, TAKE ONE TABLET BY MOUTH ONCE DAILY WITH  BREAKFAST, Disp: 60 tablet, Rfl: 6  omega-3 fatty acids-vitamin E (FISH OIL) 1,000 mg Cap, Take two capsules daily., Disp: , Rfl: 0  pantoprazole (PROTONIX) 40 MG tablet, Take 1 tablet (40 mg total) by mouth once daily., Disp: 30 tablet, Rfl: 11  pen needle, diabetic (BD ULTRA-FINE FATEMEH PEN NEEDLES) 32 gauge x 5/32" Ndle, Use once daily. 250.00 IDDM, Disp: 100 each, Rfl: " 3  triamcinolone acetonide 0.1% (KENALOG) 0.1 % cream, Apply topically 3 (three) times daily., Disp: 15 g, Rfl: 3    Allergies:  Review of patient's allergies indicates:  No Known Allergies        Review of Systems   Constitutional: Negative for fever and unexpected weight change.   HENT: Negative for congestion, dental problem, ear pain, sinus pain and sore throat.    Respiratory: Negative for cough and shortness of breath.    Cardiovascular: Negative for chest pain, palpitations and leg swelling.   Gastrointestinal: Negative for abdominal pain, constipation and diarrhea.   Genitourinary: Negative for difficulty urinating and dysuria.   Musculoskeletal: Negative for gait problem.   Neurological: Negative for dizziness, syncope and headaches.       Objective:      Physical Exam   Constitutional: She is oriented to person, place, and time. She appears well-developed and well-nourished. No distress.   HENT:   Mouth/Throat: No oropharyngeal exudate.   Eyes: No scleral icterus.   Neck: No tracheal deviation present. No thyromegaly present.   Cardiovascular: Normal rate, regular rhythm and normal heart sounds.   Pulmonary/Chest: Effort normal and breath sounds normal. No respiratory distress. She has no wheezes. She has no rales.   Abdominal: Soft. Bowel sounds are normal. There is no tenderness.   Musculoskeletal: She exhibits no edema.   Lymphadenopathy:     She has no cervical adenopathy.   Neurological: She is alert and oriented to person, place, and time.   Skin: Skin is warm and dry.   Psychiatric: She has a normal mood and affect.   Vitals reviewed.      Assessment:       1. Preoperative examination    2. Hypertension goal BP (blood pressure) < 130/80    3. Uncontrolled type 2 diabetes mellitus with stage 3 chronic kidney disease, with long-term current use of insulin        Plan:       Luda was seen today for pre-op exam.    Diagnoses and all orders for this visit:    Preoperative examination  -     No  significant abnormal lab findings that would delay or prevent surgery  -     No acute cardiac conditions  -     No symptoms of an infection  -     X-Ray Chest PA And Lateral; Future--no acute findings    Hypertension goal BP (blood pressure) < 130/80  -     X-Ray Chest PA And Lateral; Future--no acute findings    Uncontrolled type 2 diabetes mellitus with stage 3 chronic kidney disease, with long-term current use of insulin    Okay to proceed with surgery as planned.     Preoperative paperwork will be faxed to surgeon's office.

## 2018-11-05 RX ORDER — METFORMIN HYDROCHLORIDE 1000 MG/1
TABLET ORAL
Qty: 180 TABLET | Refills: 1 | Status: ON HOLD | OUTPATIENT
Start: 2018-11-05 | End: 2019-03-20

## 2018-11-07 ENCOUNTER — TELEPHONE (OUTPATIENT)
Dept: INTERNAL MEDICINE | Facility: CLINIC | Age: 68
End: 2018-11-07

## 2018-11-07 NOTE — TELEPHONE ENCOUNTER
----- Message from Rich Cummings sent at 11/7/2018 11:48 AM CST -----  Contact: pt   Pt has procedure scheduled for tomorrow and has some questions about taking insulin. pls return call.           ..[phone] (home)

## 2018-11-28 RX ORDER — BLOOD SUGAR DIAGNOSTIC
STRIP MISCELLANEOUS
Qty: 300 STRIP | Refills: 3 | Status: SHIPPED | OUTPATIENT
Start: 2018-11-28

## 2018-11-28 RX ORDER — LANCETS
EACH MISCELLANEOUS
Qty: 200 EACH | Refills: 3 | Status: SHIPPED | OUTPATIENT
Start: 2018-11-28

## 2018-12-07 ENCOUNTER — PATIENT MESSAGE (OUTPATIENT)
Dept: INTERNAL MEDICINE | Facility: CLINIC | Age: 68
End: 2018-12-07

## 2018-12-07 RX ORDER — ATORVASTATIN CALCIUM 80 MG/1
TABLET, FILM COATED ORAL
Qty: 90 TABLET | Refills: 1 | Status: SHIPPED | OUTPATIENT
Start: 2018-12-07 | End: 2019-06-28 | Stop reason: SDUPTHER

## 2018-12-17 RX ORDER — FENOFIBRATE 200 MG/1
CAPSULE ORAL
Qty: 90 CAPSULE | Refills: 1 | Status: SHIPPED | OUTPATIENT
Start: 2018-12-17 | End: 2019-04-15 | Stop reason: SDUPTHER

## 2019-01-25 DIAGNOSIS — I10 HYPERTENSION GOAL BP (BLOOD PRESSURE) < 130/80: Chronic | ICD-10-CM

## 2019-01-25 RX ORDER — LISINOPRIL 40 MG/1
TABLET ORAL
Qty: 180 TABLET | Refills: 2 | Status: SHIPPED | OUTPATIENT
Start: 2019-01-25 | End: 2020-07-06 | Stop reason: SDUPTHER

## 2019-02-28 ENCOUNTER — CLINICAL SUPPORT (OUTPATIENT)
Dept: AUDIOLOGY | Facility: CLINIC | Age: 69
End: 2019-02-28
Payer: MEDICARE

## 2019-02-28 ENCOUNTER — OFFICE VISIT (OUTPATIENT)
Dept: INTERNAL MEDICINE | Facility: CLINIC | Age: 69
End: 2019-02-28
Payer: MEDICARE

## 2019-02-28 ENCOUNTER — OFFICE VISIT (OUTPATIENT)
Dept: OTOLARYNGOLOGY | Facility: CLINIC | Age: 69
End: 2019-02-28
Payer: MEDICARE

## 2019-02-28 VITALS
HEART RATE: 92 BPM | HEIGHT: 59 IN | BODY MASS INDEX: 29.91 KG/M2 | DIASTOLIC BLOOD PRESSURE: 60 MMHG | WEIGHT: 148.38 LBS | OXYGEN SATURATION: 95 % | SYSTOLIC BLOOD PRESSURE: 136 MMHG | TEMPERATURE: 99 F

## 2019-02-28 VITALS
BODY MASS INDEX: 30.1 KG/M2 | SYSTOLIC BLOOD PRESSURE: 136 MMHG | WEIGHT: 149.06 LBS | DIASTOLIC BLOOD PRESSURE: 62 MMHG | HEART RATE: 103 BPM

## 2019-02-28 DIAGNOSIS — H61.22 IMPACTED CERUMEN, LEFT EAR: ICD-10-CM

## 2019-02-28 DIAGNOSIS — I70.0 AORTIC ATHEROSCLEROSIS: ICD-10-CM

## 2019-02-28 DIAGNOSIS — H91.90 PERCEIVED HEARING CHANGES: Primary | ICD-10-CM

## 2019-02-28 DIAGNOSIS — C50.911 BILATERAL MALIGNANT NEOPLASM OF BREAST IN FEMALE, UNSPECIFIED ESTROGEN RECEPTOR STATUS, UNSPECIFIED SITE OF BREAST: ICD-10-CM

## 2019-02-28 DIAGNOSIS — C50.912 BILATERAL MALIGNANT NEOPLASM OF BREAST IN FEMALE, UNSPECIFIED ESTROGEN RECEPTOR STATUS, UNSPECIFIED SITE OF BREAST: ICD-10-CM

## 2019-02-28 DIAGNOSIS — D50.9 IRON DEFICIENCY ANEMIA, UNSPECIFIED IRON DEFICIENCY ANEMIA TYPE: ICD-10-CM

## 2019-02-28 DIAGNOSIS — H61.22 IMPACTED CERUMEN OF LEFT EAR: ICD-10-CM

## 2019-02-28 DIAGNOSIS — E78.2 HYPERLIPIDEMIA, MIXED: Chronic | ICD-10-CM

## 2019-02-28 DIAGNOSIS — I10 HYPERTENSION GOAL BP (BLOOD PRESSURE) < 130/80: Primary | Chronic | ICD-10-CM

## 2019-02-28 DIAGNOSIS — H91.93 DECREASED HEARING OF BOTH EARS: ICD-10-CM

## 2019-02-28 DIAGNOSIS — H61.23 BILATERAL IMPACTED CERUMEN: ICD-10-CM

## 2019-02-28 PROCEDURE — 99999 PR PBB SHADOW E&M-EST. PATIENT-LVL III: ICD-10-PCS | Mod: PBBFAC,HCNC,, | Performed by: PHYSICIAN ASSISTANT

## 2019-02-28 PROCEDURE — 99999 PR PBB SHADOW E&M-EST. PATIENT-LVL III: ICD-10-PCS | Mod: PBBFAC,HCNC,, | Performed by: INTERNAL MEDICINE

## 2019-02-28 PROCEDURE — 3075F SYST BP GE 130 - 139MM HG: CPT | Mod: HCNC,CPTII,S$GLB, | Performed by: INTERNAL MEDICINE

## 2019-02-28 PROCEDURE — 1101F PR PT FALLS ASSESS DOC 0-1 FALLS W/OUT INJ PAST YR: ICD-10-PCS | Mod: HCNC,CPTII,S$GLB, | Performed by: INTERNAL MEDICINE

## 2019-02-28 PROCEDURE — 99214 OFFICE O/P EST MOD 30 MIN: CPT | Mod: HCNC,S$GLB,, | Performed by: INTERNAL MEDICINE

## 2019-02-28 PROCEDURE — 1101F PT FALLS ASSESS-DOCD LE1/YR: CPT | Mod: HCNC,CPTII,S$GLB, | Performed by: INTERNAL MEDICINE

## 2019-02-28 PROCEDURE — 3075F PR MOST RECENT SYSTOLIC BLOOD PRESS GE 130-139MM HG: ICD-10-PCS | Mod: HCNC,CPTII,S$GLB, | Performed by: INTERNAL MEDICINE

## 2019-02-28 PROCEDURE — 3075F SYST BP GE 130 - 139MM HG: CPT | Mod: HCNC,CPTII,S$GLB, | Performed by: PHYSICIAN ASSISTANT

## 2019-02-28 PROCEDURE — 99999 PR PBB SHADOW E&M-EST. PATIENT-LVL III: CPT | Mod: PBBFAC,HCNC,, | Performed by: PHYSICIAN ASSISTANT

## 2019-02-28 PROCEDURE — 3075F PR MOST RECENT SYSTOLIC BLOOD PRESS GE 130-139MM HG: ICD-10-PCS | Mod: HCNC,CPTII,S$GLB, | Performed by: PHYSICIAN ASSISTANT

## 2019-02-28 PROCEDURE — 99999 PR PBB SHADOW E&M-EST. PATIENT-LVL III: CPT | Mod: PBBFAC,HCNC,, | Performed by: INTERNAL MEDICINE

## 2019-02-28 PROCEDURE — 99214 PR OFFICE/OUTPT VISIT, EST, LEVL IV, 30-39 MIN: ICD-10-PCS | Mod: HCNC,S$GLB,, | Performed by: INTERNAL MEDICINE

## 2019-02-28 PROCEDURE — 99499 UNLISTED E&M SERVICE: CPT | Mod: HCNC,S$GLB,, | Performed by: INTERNAL MEDICINE

## 2019-02-28 PROCEDURE — 3078F PR MOST RECENT DIASTOLIC BLOOD PRESSURE < 80 MM HG: ICD-10-PCS | Mod: HCNC,CPTII,S$GLB, | Performed by: INTERNAL MEDICINE

## 2019-02-28 PROCEDURE — 1101F PT FALLS ASSESS-DOCD LE1/YR: CPT | Mod: HCNC,CPTII,S$GLB, | Performed by: PHYSICIAN ASSISTANT

## 2019-02-28 PROCEDURE — 3078F DIAST BP <80 MM HG: CPT | Mod: HCNC,CPTII,S$GLB, | Performed by: INTERNAL MEDICINE

## 2019-02-28 PROCEDURE — 99499 RISK ADDL DX/OHS AUDIT: ICD-10-PCS | Mod: HCNC,S$GLB,, | Performed by: INTERNAL MEDICINE

## 2019-02-28 PROCEDURE — 3078F DIAST BP <80 MM HG: CPT | Mod: HCNC,CPTII,S$GLB, | Performed by: PHYSICIAN ASSISTANT

## 2019-02-28 PROCEDURE — 99204 OFFICE O/P NEW MOD 45 MIN: CPT | Mod: HCNC,S$GLB,, | Performed by: PHYSICIAN ASSISTANT

## 2019-02-28 PROCEDURE — 1101F PR PT FALLS ASSESS DOC 0-1 FALLS W/OUT INJ PAST YR: ICD-10-PCS | Mod: HCNC,CPTII,S$GLB, | Performed by: PHYSICIAN ASSISTANT

## 2019-02-28 PROCEDURE — 3044F PR MOST RECENT HEMOGLOBIN A1C LEVEL <7.0%: ICD-10-PCS | Mod: HCNC,CPTII,S$GLB, | Performed by: INTERNAL MEDICINE

## 2019-02-28 PROCEDURE — 99204 PR OFFICE/OUTPT VISIT, NEW, LEVL IV, 45-59 MIN: ICD-10-PCS | Mod: HCNC,S$GLB,, | Performed by: PHYSICIAN ASSISTANT

## 2019-02-28 PROCEDURE — 3078F PR MOST RECENT DIASTOLIC BLOOD PRESSURE < 80 MM HG: ICD-10-PCS | Mod: HCNC,CPTII,S$GLB, | Performed by: PHYSICIAN ASSISTANT

## 2019-02-28 PROCEDURE — 3044F HG A1C LEVEL LT 7.0%: CPT | Mod: HCNC,CPTII,S$GLB, | Performed by: INTERNAL MEDICINE

## 2019-02-28 NOTE — PROGRESS NOTES
Subjective:       Patient ID: Luda Jc is a 68 y.o. female.    Chief Complaint: Hearing Loss    Patient is a very pleasant 68 y.o. female here to see me today for the first time for evaluation of hearing loss.  She reports hearing loss in her left ear for past 4 weeks.  She denies ear pain or drainage.  Denies tinnitus.  She denies a family history of hearing loss, and has not had any previous otologic surgery.  She denies any history of significant loud noise exposure. She has issues with dizziness but relates it to her medications for breast cancer.  She wears ear plugs at night while sleeping ( snores).  Tried flushing her left ear while in shower recently and some cerumen came out; but then stopped up more after second attempt at flushing it out.        Review of Systems   Constitutional: Positive for activity change and fatigue. Negative for appetite change and fever.   HENT: Positive for hearing loss. Negative for congestion, ear discharge, ear pain, nosebleeds, rhinorrhea, sinus pressure, sore throat, tinnitus and trouble swallowing.    Eyes: Negative for discharge.   Respiratory: Negative for cough and shortness of breath.    Cardiovascular: Negative for chest pain.   Gastrointestinal: Negative for diarrhea and vomiting.   Musculoskeletal: Negative for gait problem.   Allergic/Immunologic: Negative for food allergies.   Neurological: Positive for dizziness. Negative for light-headedness and headaches.   Hematological: Negative for adenopathy.   Psychiatric/Behavioral: Negative for confusion.       Objective:      Physical Exam   Constitutional: She is oriented to person, place, and time. She appears well-developed and well-nourished. She is cooperative. No distress.   HENT:   Head: Normocephalic and atraumatic.   Right Ear: Tympanic membrane, external ear and ear canal normal. No middle ear effusion.   Left Ear: External ear and ear canal normal.   Nose: Nose normal. No mucosal edema, rhinorrhea,  nasal deformity or septal deviation. No epistaxis. Right sinus exhibits no maxillary sinus tenderness and no frontal sinus tenderness. Left sinus exhibits no maxillary sinus tenderness and no frontal sinus tenderness.   Mouth/Throat: Uvula is midline, oropharynx is clear and moist and mucous membranes are normal. Mucous membranes are not pale and not dry. No trismus in the jaw. Normal dentition. No uvula swelling. No oropharyngeal exudate or posterior oropharyngeal erythema.   Left cerumen impaction (removal described below)   Eyes: Conjunctivae, EOM and lids are normal. Pupils are equal, round, and reactive to light. Right eye exhibits no chemosis. Left eye exhibits no chemosis. Right conjunctiva is not injected. Left conjunctiva is not injected. No scleral icterus. Right eye exhibits normal extraocular motion and no nystagmus. Left eye exhibits normal extraocular motion and no nystagmus.   Neck: Trachea normal and phonation normal. No tracheal tenderness present. No tracheal deviation present. No thyroid mass and no thyromegaly present.   Cardiovascular: Intact distal pulses.   Pulmonary/Chest: Effort normal. No stridor. No respiratory distress.   Abdominal: She exhibits no distension.   Lymphadenopathy:        Head (right side): No submental, no submandibular, no preauricular and no posterior auricular adenopathy present.        Head (left side): No submental, no submandibular, no preauricular and no posterior auricular adenopathy present.     She has no cervical adenopathy.   Neurological: She is alert and oriented to person, place, and time. No cranial nerve deficit.   Skin: Skin is warm and dry. No rash noted. No erythema.   Psychiatric: She has a normal mood and affect. Her behavior is normal.           Procedure Note    CHIEF COMPLAINT:  Cerumen Impaction    Description:  The patient was seated in an exam chair.  An ear speculum was placed in the left EAC and was examined under the microscope.  Suction  and/or alligator forceps were used to remove a large cerumen impaction.  The posterior tympanic membrane was visualized and was normal in appearance.  She has cerumen adherent to TM in deep anterior retraction pocket that I'm unable to remove today.  The patient tolerated the procedure well.      Assessment:       1. Perceived hearing changes    2. Impacted cerumen of left ear        Plan:          Unable to fully clear the wax from her LEFT ear today.  Recommend the use of a wax softening drop, either over the counter Debrox or mineral oil, on a nightly basis for the next week and then RTC for repeat ear cleaning.  I also instructed the patient to avoid Qtips.  Recommend audiogram at her return visit once canal cleared of cerumen.  She wishes to call back to schedule this.

## 2019-02-28 NOTE — PROGRESS NOTES
Referring provider: Myrna Rojas PA-C    Luda Jc was seen 02/28/2019 for an audiological evaluation.  Patient complains of hearing loss in her left ear for past 4 weeks.  She denies ear pain or drainage.  Denies tinnitus.  She denies a family history of hearing loss, and has not had any previous otologic surgery.  She denies any history of significant loud noise exposure. She has issues with dizziness but relates it to her medications for breast cancer.  She wears ear plugs at night while sleeping ( snores).  Tried flushing her left ear while in shower recently and some cerumen came out; but then stopped up more after second attempt at flushing it out.    Audiogram could not be completed due to wax impaction.      Recommendations:  1. Patient saw ENT today for cerumen; partial removal.  Recommended ear wax softening drops and to return.  Patient will call back to schedule follow-up appointment.

## 2019-02-28 NOTE — PROGRESS NOTES
Subjective:       Patient ID: Luda Jc is a 68 y.o. female.    Chief Complaint: Annual Exam    Luda Jc  68 y.o. White female     Patient presents with:  Annual Exam    HPI: Here today for an annual physical.   HTN--stable on amlodipine, chlorthalidone, lisinopril and metoprolol.    HLD--compliant with atorvastatin.                 CHOL                     141                 08/08/2018                 HDL                      24 (L)              08/08/2018                 LDLCALC                  39.0 (L)            08/08/2018                 TRIG                     376 (H)             05/08/2018            Diabetes--compliant with metformin, Novolog and Lantus. She has made dietary changes as well.                 HGBA1C                   6.8 (H)             08/08/2018            CKD III--compliant with lisinopril. She denies taking NSAID's.   Anemia--management per hematology.   Breast cancer--bilateral. Management per oncology.   Aortic atherosclerosis--seen on prior imaging. Asymptomatic.   She has decreased hearing in both ears. She thinks her ears are clogged.     Zoster Vaccine due on 12/17/2009  Foot Exam due on 02/08/2019  Hemoglobin A1c due on 04/01/2019  DEXA SCAN due on 04/24/2019  Eye Exam due on 05/22/2019  Lipid Panel due on 08/08/2019  Urine Microalbumin due on 08/08/2019  Mammogram due on 10/11/2019  High Dose Statin due on 02/28/2020  TETANUS VACCINE due on 04/28/2027  Colonoscopy due on 10/09/2028  Pneumococcal Vaccine (65+ High/Highest Risk) Completed  Influenza Vaccine Completed  Hepatitis C Screening Completed    Past Medical History:  10/11/2014: Abnormal LFTs (liver function tests)  Arthritis  Chronic kidney disease  Diabetes mellitus, type 2  Encounter for blood transfusion  Fatty liver      Comment:  CT Abdomen/pelvis 6/24/2015---Fatty infiltration of the                liver.  Hyperlipidemia  Hypertension  10/11/2014: Insulin long-term use  Nephrolithiasis      Comment:  CT  Abdomen/pelvis 6/24/2015---right nephrolithiasis.  Obesity  6/16/2014: Stage II infiltrating ductal carcinoma of breast      Comment:  Followed by Dr. Virgen     Past Surgical History:  APPENDECTOMY  2014: BREAST LUMPECTOMY; Right  10/9/2018: COLONOSCOPY; N/A      Comment:  Performed by Branden Carpenter MD at [facility] [place] [facility]  10/9/2018: ESOPHAGOGASTRODUODENOSCOPY (EGD); N/A      Comment:  Performed by Branden Carpenter MD at [facility] [place] [facility]  TONSILLECTOMY, ADENOIDECTOMY    Review of patient's family history indicates:  Problem: Cancer      Relation: Mother          Age of Onset: (Not Specified)          Comment: Stomach  Problem: Cancer      Relation: Father          Age of Onset: (Not Specified)          Comment: stomach  Problem: Heart disease      Relation: Father          Age of Onset: (Not Specified)  Problem: Stroke      Relation: Son          Age of Onset: (Not Specified)  Problem: Diabetes      Relation: Paternal Aunt          Age of Onset: (Not Specified)  Problem: Kidney disease      Relation: Neg Hx          Age of Onset: (Not Specified)      Current Outpatient Medications on File Prior to Visit:  ACCU-CHEK NICOLLE PLUS TEST STRP Strp, USE ONE STRIP TO CHECK GLUCOSE 2 TO 3 TIMES DAILY AS DIRECTED, Disp: 300 strip, Rfl: 3  ACCU-CHEK SOFT DEV LANCETS Kit, USE AS DIRECTED, Disp: 1 each, Rfl: 0  amLODIPine (NORVASC) 10 MG tablet, TAKE 1 TABLET BY MOUTH ONCE DAILY, Disp: 90 tablet, Rfl: 1  atorvastatin (LIPITOR) 80 MG tablet, TAKE 1 TABLET BY MOUTH ONCE DAILY, Disp: 90 tablet, Rfl: 1  blood-glucose meter kit, Use as instructed, Disp: 1 each, Rfl: 0  chlorthalidone (HYGROTEN) 50 MG Tab, TAKE ONE TABLET BY MOUTH ONCE DAILY, Disp: 30 tablet, Rfl: 6  cyclobenzaprine (FLEXERIL) 5 MG tablet, Take 1 tablet (5 mg total) by mouth nightly., Disp: 30 tablet, Rfl: 0  diphenoxylate-atropine 2.5-0.025 mg (LOMOTIL) 2.5-0.025 mg per tablet, Take 2.5 tablets by mouth., Disp: , Rfl:   fenofibrate micronized (LOFIBRA) 200 MG Cap, TAKE 1 CAPSULE  "BY MOUTH ONCE DAILY WITH BREAKFAST, Disp: 90 capsule, Rfl: 1  glimepiride (AMARYL) 2 MG tablet, TAKE ONE TABLET BY MOUTH ONCE DAILY WITH  BREAKFAST, Disp: 60 tablet, Rfl: 6  insulin aspart U-100 (NOVOLOG FLEXPEN U-100 INSULIN) 100 unit/mL InPn pen, INJECT 10 UNITS SUBCUTANEOUSLY THREE TIMES DAILY WITH MEALS, Disp: 1 Box, Rfl: 6  insulin glargine (LANTUS SOLOSTAR) 100 unit/mL (3 mL) InPn pen, INJECT 34 UNITS SUBCUTANEOUSLY IN THE MORNING AND 30 IN THE EVENING, Disp: 2 Box, Rfl: 6  lancets (ACCU-CHEK SOFTCLIX LANCETS) Misc, USE AS DIRECTED TWICE DAILY, Disp: 200 each, Rfl: 3  lisinopril (PRINIVIL,ZESTRIL) 40 MG tablet, TAKE 1 TABLET BY MOUTH TWICE DAILY, Disp: 180 tablet, Rfl: 2  metFORMIN (GLUCOPHAGE) 1000 MG tablet, TAKE 1 TABLET BY MOUTH TWICE DAILY WITH MEALS, Disp: 180 tablet, Rfl: 1  metoprolol succinate (TOPROL-XL) 25 MG 24 hr tablet, Take 1 tablet (25 mg total) by mouth once daily., Disp: 30 tablet, Rfl: 3  omega-3 fatty acids-vitamin E (FISH OIL) 1,000 mg Cap, Take two capsules daily., Disp: , Rfl: 0  pantoprazole (PROTONIX) 40 MG tablet, Take 1 tablet (40 mg total) by mouth once daily., Disp: 30 tablet, Rfl: 11  pen needle, diabetic (BD ULTRA-FINE FATEMEH PEN NEEDLES) 32 gauge x 5/32" Ndle, Use once daily. 250.00 IDDM, Disp: 100 each, Rfl: 3  tamoxifen (NOLVADEX) 20 MG Tab, , Disp: , Rfl:   triamcinolone acetonide 0.1% (KENALOG) 0.1 % cream, Apply topically 3 (three) times daily., Disp: 15 g, Rfl: 3    Allergies:  Review of patient's allergies indicates:  No Known Allergies                Review of Systems   Constitutional: Negative for fever and unexpected weight change.   HENT: Positive for ear discharge and hearing loss. Negative for ear pain.    Respiratory: Negative for cough and shortness of breath.    Cardiovascular: Positive for leg swelling (both ankles/feet). Negative for chest pain.   Gastrointestinal: Negative for abdominal pain and blood in stool.   Genitourinary: Positive for dysuria (recent UA " negative for UTI (see Care Everywhere) ).   Musculoskeletal: Negative for gait problem.   Neurological: Negative for dizziness, numbness and headaches.       Objective:      Physical Exam   Constitutional: She is oriented to person, place, and time. She appears well-developed and well-nourished. No distress.   HENT:   Mouth/Throat: No oropharyngeal exudate.   Bilateral cerumen impaction.    Eyes: No scleral icterus.   Neck: No tracheal deviation present.   Cardiovascular: Normal rate, regular rhythm and normal heart sounds.   Pulses:       Dorsalis pedis pulses are 2+ on the right side.   Pulmonary/Chest: Effort normal and breath sounds normal. No respiratory distress. She has no wheezes. She has no rales.   Abdominal: Soft. Bowel sounds are normal.   Musculoskeletal: She exhibits edema (Bilateral feet/ankles).   Feet:   Right Foot:   Protective Sensation: 4 sites tested. 4 sites sensed.   Skin Integrity: Positive for callus. Negative for ulcer.   Left Foot:   Protective Sensation: 4 sites tested. 4 sites sensed.   Skin Integrity: Positive for callus. Negative for ulcer.   Neurological: She is alert and oriented to person, place, and time.   Skin: Skin is warm and dry.   Psychiatric: She has a normal mood and affect.   Vitals reviewed.      Assessment:       1. Hypertension goal BP (blood pressure) < 130/80    2. Hyperlipidemia, mixed    3. Uncontrolled type 2 diabetes mellitus with stage 3 chronic kidney disease, with long-term current use of insulin    4. Iron deficiency anemia, unspecified iron deficiency anemia type    5. Bilateral malignant neoplasm of breast in female, unspecified estrogen receptor status, unspecified site of breast    6. Aortic atherosclerosis    7. Bilateral impacted cerumen    8. Decreased hearing of both ears        Plan:       Luda was seen today for annual exam.    Diagnoses and all orders for this visit:    Hypertension goal BP (blood pressure) < 130/80  -     Continue current  management    Hyperlipidemia, mixed  -     Check lipid panel    Uncontrolled type 2 diabetes mellitus with stage 3 chronic kidney disease, with long-term current use of insulin  -     Check A1C, CMP and urine microalbumin/creatinine  -     Advised to avoid NSAID's    Iron deficiency anemia, unspecified iron deficiency anemia type  -     F/U with hematology    Bilateral malignant neoplasm of breast in female, unspecified estrogen receptor status, unspecified site of breast  -     F/U with oncology    Aortic atherosclerosis  -     Asymptomatic    Decreased hearing of both ears  -     Ambulatory consult to ENT    Bilateral impacted cerumen  -     Ear wax removal--unsuccessful  -     Ambulatory consult to ENT    Schedule labs.     F/U in 6 months and as needed.

## 2019-02-28 NOTE — LETTER
February 28, 2019      Leanne Bonilla DO  [address] [place] [address] [place] [address]           [facility]  [address] [place] [address] E[address]  [place] [address]  Phone: [phone]  Fax: [phone]          Patient: Luda Jc   MR Number: [id number]   YOB: 1950   Date of Visit: 2/28/2019       Dear Dr. Leanne Bonilla:    Thank you for referring Luda Jc to me for evaluation. Attached you will find relevant portions of my assessment and plan of care.    If you have questions, please do not hesitate to call me. I look forward to following Luda Jc along with you.    Sincerely,    Myrna Rojas PA-C    Enclosure  CC:  No Recipients    If you would like to receive this communication electronically, please contact [email or web address] or [phone] to request more information on [employer] access.    For providers and/or their staff who would like to refer a patient to [facility], please contact us through our one-stop-shop provider referral line, Rufino Mendez, at [phone].    If you feel you have received this communication in error or would no longer like to receive these types of communications, please e-mail [email or web address]

## 2019-03-07 DIAGNOSIS — E78.2 COMBINED HYPERLIPIDEMIA ASSOCIATED WITH TYPE 2 DIABETES MELLITUS: ICD-10-CM

## 2019-03-07 DIAGNOSIS — E11.59 HYPERTENSION ASSOCIATED WITH DIABETES: ICD-10-CM

## 2019-03-07 DIAGNOSIS — R80.9 DIABETES MELLITUS WITH MICROALBUMINURIA: ICD-10-CM

## 2019-03-07 DIAGNOSIS — E11.22 TYPE 2 DIABETES MELLITUS WITH STAGE 3 CHRONIC KIDNEY DISEASE, WITH LONG-TERM CURRENT USE OF INSULIN: ICD-10-CM

## 2019-03-07 DIAGNOSIS — Z79.4 TYPE 2 DIABETES MELLITUS WITH STAGE 3 CHRONIC KIDNEY DISEASE, WITH LONG-TERM CURRENT USE OF INSULIN: ICD-10-CM

## 2019-03-07 DIAGNOSIS — N18.30 TYPE 2 DIABETES MELLITUS WITH STAGE 3 CHRONIC KIDNEY DISEASE, WITH LONG-TERM CURRENT USE OF INSULIN: ICD-10-CM

## 2019-03-07 DIAGNOSIS — I15.2 HYPERTENSION ASSOCIATED WITH DIABETES: ICD-10-CM

## 2019-03-07 DIAGNOSIS — E11.29 DIABETES MELLITUS WITH MICROALBUMINURIA: ICD-10-CM

## 2019-03-07 DIAGNOSIS — E11.69 COMBINED HYPERLIPIDEMIA ASSOCIATED WITH TYPE 2 DIABETES MELLITUS: ICD-10-CM

## 2019-03-08 RX ORDER — INSULIN GLARGINE 100 [IU]/ML
INJECTION, SOLUTION SUBCUTANEOUS
Qty: 2 BOX | Refills: 6 | Status: SHIPPED | OUTPATIENT
Start: 2019-03-08 | End: 2020-05-11

## 2019-03-19 ENCOUNTER — HOSPITAL ENCOUNTER (OUTPATIENT)
Facility: HOSPITAL | Age: 69
Discharge: HOME HEALTH | End: 2019-03-20
Attending: EMERGENCY MEDICINE | Admitting: INTERNAL MEDICINE
Payer: MEDICARE

## 2019-03-19 DIAGNOSIS — E87.6 HYPOKALEMIA: ICD-10-CM

## 2019-03-19 DIAGNOSIS — E83.51 HYPOCALCEMIA: ICD-10-CM

## 2019-03-19 DIAGNOSIS — R94.31 PROLONGED QT INTERVAL: ICD-10-CM

## 2019-03-19 DIAGNOSIS — R10.2 PELVIC PAIN: ICD-10-CM

## 2019-03-19 DIAGNOSIS — E83.42 HYPOMAGNESEMIA: ICD-10-CM

## 2019-03-19 DIAGNOSIS — E16.0 HYPOGLYCEMIA SECONDARY TO SULFONYLUREA: ICD-10-CM

## 2019-03-19 DIAGNOSIS — T38.3X1A HYPOGLYCEMIA SECONDARY TO SULFONYLUREA, ACCIDENTAL OR UNINTENTIONAL, INITIAL ENCOUNTER: Primary | ICD-10-CM

## 2019-03-19 DIAGNOSIS — T38.3X1A HYPOGLYCEMIA SECONDARY TO SULFONYLUREA: ICD-10-CM

## 2019-03-19 DIAGNOSIS — E16.0 HYPOGLYCEMIA SECONDARY TO SULFONYLUREA, ACCIDENTAL OR UNINTENTIONAL, INITIAL ENCOUNTER: Primary | ICD-10-CM

## 2019-03-19 PROBLEM — N18.30 CKD (CHRONIC KIDNEY DISEASE), STAGE III: Status: ACTIVE | Noted: 2019-03-19

## 2019-03-19 PROBLEM — D69.6 THROMBOCYTOPENIA: Status: ACTIVE | Noted: 2019-03-19

## 2019-03-19 LAB
ALBUMIN SERPL BCP-MCNC: 3.5 G/DL
ALP SERPL-CCNC: 68 U/L
ALT SERPL W/O P-5'-P-CCNC: 13 U/L
ANION GAP SERPL CALC-SCNC: 16 MMOL/L
AST SERPL-CCNC: 32 U/L
BASOPHILS # BLD AUTO: 0.01 K/UL
BASOPHILS NFR BLD: 0.2 %
BILIRUB SERPL-MCNC: 1.3 MG/DL
BUN SERPL-MCNC: 15 MG/DL
CALCIUM SERPL-MCNC: 7.9 MG/DL
CHLORIDE SERPL-SCNC: 103 MMOL/L
CO2 SERPL-SCNC: 22 MMOL/L
CREAT SERPL-MCNC: 1.1 MG/DL
DIFFERENTIAL METHOD: ABNORMAL
EOSINOPHIL # BLD AUTO: 0.1 K/UL
EOSINOPHIL NFR BLD: 1 %
ERYTHROCYTE [DISTWIDTH] IN BLOOD BY AUTOMATED COUNT: 19.2 %
EST. GFR  (AFRICAN AMERICAN): 59 ML/MIN/1.73 M^2
EST. GFR  (NON AFRICAN AMERICAN): 51 ML/MIN/1.73 M^2
GLUCOSE SERPL-MCNC: 32 MG/DL
HCT VFR BLD AUTO: 26.5 %
HGB BLD-MCNC: 8.8 G/DL
LYMPHOCYTES # BLD AUTO: 0.7 K/UL
LYMPHOCYTES NFR BLD: 12.7 %
MAGNESIUM SERPL-MCNC: 0.9 MG/DL
MCH RBC QN AUTO: 32.7 PG
MCHC RBC AUTO-ENTMCNC: 33.2 G/DL
MCV RBC AUTO: 99 FL
MONOCYTES # BLD AUTO: 0.4 K/UL
MONOCYTES NFR BLD: 7.9 %
NEUTROPHILS # BLD AUTO: 4.1 K/UL
NEUTROPHILS NFR BLD: 78.2 %
PLATELET # BLD AUTO: 117 K/UL
PMV BLD AUTO: 10.5 FL
POCT GLUCOSE: 102 MG/DL (ref 70–110)
POCT GLUCOSE: 107 MG/DL (ref 70–110)
POCT GLUCOSE: 29 MG/DL (ref 70–110)
POCT GLUCOSE: 50 MG/DL (ref 70–110)
POCT GLUCOSE: 51 MG/DL (ref 70–110)
POCT GLUCOSE: 69 MG/DL (ref 70–110)
POCT GLUCOSE: 74 MG/DL (ref 70–110)
POCT GLUCOSE: 75 MG/DL (ref 70–110)
POCT GLUCOSE: 96 MG/DL (ref 70–110)
POCT GLUCOSE: 97 MG/DL (ref 70–110)
POTASSIUM SERPL-SCNC: 2.8 MMOL/L
PROT SERPL-MCNC: 6.4 G/DL
RBC # BLD AUTO: 2.69 M/UL
SODIUM SERPL-SCNC: 141 MMOL/L
WBC # BLD AUTO: 5.18 K/UL

## 2019-03-19 PROCEDURE — 96367 TX/PROPH/DG ADDL SEQ IV INF: CPT | Mod: HCNC

## 2019-03-19 PROCEDURE — 96365 THER/PROPH/DIAG IV INF INIT: CPT | Mod: HCNC

## 2019-03-19 PROCEDURE — 63600175 PHARM REV CODE 636 W HCPCS: Mod: HCNC | Performed by: EMERGENCY MEDICINE

## 2019-03-19 PROCEDURE — 80053 COMPREHEN METABOLIC PANEL: CPT | Mod: HCNC

## 2019-03-19 PROCEDURE — 96375 TX/PRO/DX INJ NEW DRUG ADDON: CPT | Performed by: EMERGENCY MEDICINE

## 2019-03-19 PROCEDURE — 93005 ELECTROCARDIOGRAM TRACING: CPT | Mod: HCNC

## 2019-03-19 PROCEDURE — 96361 HYDRATE IV INFUSION ADD-ON: CPT | Performed by: EMERGENCY MEDICINE

## 2019-03-19 PROCEDURE — 25000003 PHARM REV CODE 250: Mod: HCNC

## 2019-03-19 PROCEDURE — 99291 CRITICAL CARE FIRST HOUR: CPT | Mod: HCNC,,, | Performed by: INTERNAL MEDICINE

## 2019-03-19 PROCEDURE — 85025 COMPLETE CBC W/AUTO DIFF WBC: CPT | Mod: HCNC

## 2019-03-19 PROCEDURE — 81003 URINALYSIS AUTO W/O SCOPE: CPT | Mod: HCNC

## 2019-03-19 PROCEDURE — 96376 TX/PRO/DX INJ SAME DRUG ADON: CPT | Performed by: EMERGENCY MEDICINE

## 2019-03-19 PROCEDURE — G0378 HOSPITAL OBSERVATION PER HR: HCPCS | Mod: HCNC

## 2019-03-19 PROCEDURE — 96366 THER/PROPH/DIAG IV INF ADDON: CPT | Performed by: EMERGENCY MEDICINE

## 2019-03-19 PROCEDURE — 63600175 PHARM REV CODE 636 W HCPCS: Mod: HCNC | Performed by: PHYSICIAN ASSISTANT

## 2019-03-19 PROCEDURE — 82962 GLUCOSE BLOOD TEST: CPT | Mod: HCNC

## 2019-03-19 PROCEDURE — 25000003 PHARM REV CODE 250: Mod: HCNC | Performed by: EMERGENCY MEDICINE

## 2019-03-19 PROCEDURE — 99291 CRITICAL CARE FIRST HOUR: CPT | Mod: 25,HCNC

## 2019-03-19 PROCEDURE — 99291 PR CRITICAL CARE, E/M 30-74 MINUTES: ICD-10-PCS | Mod: HCNC,,, | Performed by: INTERNAL MEDICINE

## 2019-03-19 PROCEDURE — 96375 TX/PRO/DX INJ NEW DRUG ADDON: CPT | Mod: HCNC

## 2019-03-19 PROCEDURE — 25000003 PHARM REV CODE 250: Mod: HCNC | Performed by: PHYSICIAN ASSISTANT

## 2019-03-19 PROCEDURE — 36415 COLL VENOUS BLD VENIPUNCTURE: CPT | Mod: HCNC

## 2019-03-19 PROCEDURE — 83735 ASSAY OF MAGNESIUM: CPT | Mod: HCNC

## 2019-03-19 RX ORDER — DEXTROSE MONOHYDRATE 25 G/50ML
INJECTION, SOLUTION INTRAVENOUS
Status: COMPLETED
Start: 2019-03-19 | End: 2019-03-19

## 2019-03-19 RX ORDER — PANTOPRAZOLE SODIUM 40 MG/1
40 TABLET, DELAYED RELEASE ORAL DAILY
Status: DISCONTINUED | OUTPATIENT
Start: 2019-03-20 | End: 2019-03-20 | Stop reason: HOSPADM

## 2019-03-19 RX ORDER — ACETAMINOPHEN 325 MG/1
650 TABLET ORAL EVERY 6 HOURS PRN
Status: DISCONTINUED | OUTPATIENT
Start: 2019-03-19 | End: 2019-03-20 | Stop reason: HOSPADM

## 2019-03-19 RX ORDER — MAGNESIUM SULFATE HEPTAHYDRATE 40 MG/ML
2 INJECTION, SOLUTION INTRAVENOUS
Status: COMPLETED | OUTPATIENT
Start: 2019-03-19 | End: 2019-03-19

## 2019-03-19 RX ORDER — CAPECITABINE 500 MG/1
500 TABLET, FILM COATED ORAL 2 TIMES DAILY
Status: DISCONTINUED | OUTPATIENT
Start: 2019-03-19 | End: 2019-03-19

## 2019-03-19 RX ORDER — POTASSIUM CHLORIDE 7.45 MG/ML
10 INJECTION INTRAVENOUS
Status: DISCONTINUED | OUTPATIENT
Start: 2019-03-19 | End: 2019-03-19

## 2019-03-19 RX ORDER — POTASSIUM CHLORIDE 20 MEQ/1
40 TABLET, EXTENDED RELEASE ORAL
Status: COMPLETED | OUTPATIENT
Start: 2019-03-19 | End: 2019-03-19

## 2019-03-19 RX ORDER — HYDRALAZINE HYDROCHLORIDE 20 MG/ML
10 INJECTION INTRAMUSCULAR; INTRAVENOUS EVERY 6 HOURS PRN
Status: DISCONTINUED | OUTPATIENT
Start: 2019-03-19 | End: 2019-03-20 | Stop reason: HOSPADM

## 2019-03-19 RX ORDER — ONDANSETRON 8 MG/1
8 TABLET, ORALLY DISINTEGRATING ORAL EVERY 8 HOURS PRN
Status: DISCONTINUED | OUTPATIENT
Start: 2019-03-19 | End: 2019-03-20 | Stop reason: HOSPADM

## 2019-03-19 RX ORDER — DEXTROSE MONOHYDRATE AND SODIUM CHLORIDE 5; .9 G/100ML; G/100ML
INJECTION, SOLUTION INTRAVENOUS CONTINUOUS
Status: ACTIVE | OUTPATIENT
Start: 2019-03-19 | End: 2019-03-19

## 2019-03-19 RX ORDER — ATORVASTATIN CALCIUM 40 MG/1
80 TABLET, FILM COATED ORAL DAILY
Status: DISCONTINUED | OUTPATIENT
Start: 2019-03-20 | End: 2019-03-20 | Stop reason: HOSPADM

## 2019-03-19 RX ORDER — HYDROCODONE BITARTRATE AND ACETAMINOPHEN 5; 325 MG/1; MG/1
1 TABLET ORAL EVERY 6 HOURS PRN
Status: DISCONTINUED | OUTPATIENT
Start: 2019-03-19 | End: 2019-03-20 | Stop reason: HOSPADM

## 2019-03-19 RX ORDER — DEXTROSE MONOHYDRATE 25 G/50ML
25 INJECTION, SOLUTION INTRAVENOUS
Status: COMPLETED | OUTPATIENT
Start: 2019-03-19 | End: 2019-03-19

## 2019-03-19 RX ORDER — LANOLIN ALCOHOL/MO/W.PET/CERES
400 CREAM (GRAM) TOPICAL 2 TIMES DAILY
Status: DISCONTINUED | OUTPATIENT
Start: 2019-03-19 | End: 2019-03-20 | Stop reason: HOSPADM

## 2019-03-19 RX ORDER — CAPECITABINE 500 MG/1
500 TABLET, FILM COATED ORAL 2 TIMES DAILY
COMMUNITY
End: 2020-07-06

## 2019-03-19 RX ORDER — LISINOPRIL 20 MG/1
40 TABLET ORAL 2 TIMES DAILY
Status: DISCONTINUED | OUTPATIENT
Start: 2019-03-19 | End: 2019-03-20 | Stop reason: HOSPADM

## 2019-03-19 RX ORDER — AMLODIPINE BESYLATE 10 MG/1
10 TABLET ORAL DAILY
Status: DISCONTINUED | OUTPATIENT
Start: 2019-03-20 | End: 2019-03-20 | Stop reason: HOSPADM

## 2019-03-19 RX ORDER — POTASSIUM CHLORIDE 7.45 MG/ML
10 INJECTION INTRAVENOUS
Status: COMPLETED | OUTPATIENT
Start: 2019-03-19 | End: 2019-03-19

## 2019-03-19 RX ORDER — POTASSIUM CHLORIDE 20 MEQ/1
40 TABLET, EXTENDED RELEASE ORAL EVERY 4 HOURS
Status: COMPLETED | OUTPATIENT
Start: 2019-03-19 | End: 2019-03-19

## 2019-03-19 RX ADMIN — POTASSIUM CHLORIDE 10 MEQ: 10 INJECTION, SOLUTION INTRAVENOUS at 03:03

## 2019-03-19 RX ADMIN — CALCIUM GLUCONATE 1 G: 94 INJECTION, SOLUTION INTRAVENOUS at 03:03

## 2019-03-19 RX ADMIN — DEXTROSE MONOHYDRATE: 25 INJECTION, SOLUTION INTRAVENOUS at 02:03

## 2019-03-19 RX ADMIN — MAGNESIUM OXIDE TAB 400 MG (241.3 MG ELEMENTAL MG) 400 MG: 400 (241.3 MG) TAB at 09:03

## 2019-03-19 RX ADMIN — POTASSIUM CHLORIDE 10 MEQ: 10 INJECTION, SOLUTION INTRAVENOUS at 06:03

## 2019-03-19 RX ADMIN — MAGNESIUM SULFATE IN WATER 2 G: 40 INJECTION, SOLUTION INTRAVENOUS at 03:03

## 2019-03-19 RX ADMIN — HYDROCODONE BITARTRATE AND ACETAMINOPHEN 1 TABLET: 5; 325 TABLET ORAL at 06:03

## 2019-03-19 RX ADMIN — LISINOPRIL 40 MG: 20 TABLET ORAL at 09:03

## 2019-03-19 RX ADMIN — HYDRALAZINE HYDROCHLORIDE 10 MG: 20 INJECTION INTRAMUSCULAR; INTRAVENOUS at 06:03

## 2019-03-19 RX ADMIN — DEXTROSE AND SODIUM CHLORIDE: 5; .9 INJECTION, SOLUTION INTRAVENOUS at 06:03

## 2019-03-19 RX ADMIN — POTASSIUM CHLORIDE 40 MEQ: 1500 TABLET, EXTENDED RELEASE ORAL at 09:03

## 2019-03-19 RX ADMIN — POTASSIUM CHLORIDE 40 MEQ: 1500 TABLET, EXTENDED RELEASE ORAL at 06:03

## 2019-03-19 RX ADMIN — POTASSIUM CHLORIDE 40 MEQ: 1500 TABLET, EXTENDED RELEASE ORAL at 03:03

## 2019-03-19 RX ADMIN — POTASSIUM CHLORIDE 10 MEQ: 10 INJECTION, SOLUTION INTRAVENOUS at 05:03

## 2019-03-19 RX ADMIN — DEXTROSE MONOHYDRATE 25 G: 25 INJECTION, SOLUTION INTRAVENOUS at 02:03

## 2019-03-19 NOTE — SUBJECTIVE & OBJECTIVE
"Past Medical History:   Diagnosis Date    Abnormal LFTs (liver function tests) 10/11/2014    Arthritis     Chronic kidney disease     Diabetes mellitus, type 2     Encounter for blood transfusion     Fatty liver     CT Abdomen/pelvis 6/24/2015---Fatty infiltration of the liver.    Hyperlipidemia     Hypertension     Insulin long-term use 10/11/2014    Nephrolithiasis     CT Abdomen/pelvis 6/24/2015---right nephrolithiasis.    Obesity     Stage II infiltrating ductal carcinoma of breast 6/16/2014    Followed by Dr. Virgen        Past Surgical History:   Procedure Laterality Date    APPENDECTOMY      BREAST LUMPECTOMY Right 2014    COLONOSCOPY N/A 10/9/2018    [facility]    ESOPHAGOGASTRODUODENOSCOPY (EGD) N/A 10/9/2018    [facility]    TONSILLECTOMY, ADENOIDECTOMY         Review of patient's allergies indicates:  No Known Allergies    Family History     Problem Relation (Age of Onset)    Cancer Mother, Father    Diabetes Paternal Aunt    Heart disease Father    Stroke Son        Tobacco Use    Smoking status: Never Smoker    Smokeless tobacco: Never Used   Substance and Sexual Activity    Alcohol use: Yes     Alcohol/week: 0.0 oz     Comment: " occassionally"    Drug use: No    Sexual activity: No     Partners: Male     Birth control/protection: Post-menopausal         Review of Systems   Constitutional: Positive for fatigue. Negative for chills and fever.   HENT: Negative for nosebleeds.    Eyes: Negative for discharge.   Respiratory: Negative for choking.    Gastrointestinal: Negative for blood in stool.   Endocrine:        Diabetes mellitus type 2   Genitourinary: Negative for hematuria.        Right and left breast CA status post surgery and adjuvant chemotherapy 2014 and 2018   Musculoskeletal: Negative for neck stiffness.        Fall   Skin: Negative for rash.   Allergic/Immunologic: Positive for immunocompromised state. "   Neurological: Positive for dizziness and light-headedness. Negative for seizures.   Hematological: Negative for adenopathy.   Psychiatric/Behavioral: Negative for behavioral problems.     Objective:     Vital Signs (Most Recent):  Temp: 98 °F (36.7 °C) (03/19/19 1357)  Pulse: 102 (03/19/19 1500)  Resp: 18 (03/19/19 1500)  BP: (!) 142/69 (03/19/19 1500)  SpO2: 96 % (03/19/19 1500) Vital Signs (24h Range):  Temp:  [98 °F (36.7 °C)] 98 °F (36.7 °C)  Pulse:  [102-109] 102  Resp:  [18-20] 18  SpO2:  [93 %-99 %] 96 %  BP: (142-157)/(66-74) 142/69     Weight: 69 kg (152 lb 1.9 oz)  Body mass index is 30.72 kg/m².    No intake or output data in the 24 hours ending 03/19/19 1613    Physical Exam   Constitutional: She is oriented to person, place, and time. She appears well-developed and well-nourished. No distress.   HENT:   Head: Normocephalic and atraumatic.   Eyes: EOM are normal.   Neck: Neck supple.   Cardiovascular: Normal rate and regular rhythm.   Pulmonary/Chest: Effort normal.   Abdominal: Soft. There is no tenderness.   Musculoskeletal: She exhibits no edema.   Neurological: She is alert and oriented to person, place, and time.   Psychiatric: She has a normal mood and affect.   Nursing note and vitals reviewed.      Vents:       Lines/Drains/Airways     Peripheral Intravenous Line                 Peripheral IV - Single Lumen 06/24/15 1040 Right Antecubital 1364 days         Peripheral IV - Single Lumen 10/09/18 0740 Left Antecubital 161 days         Peripheral IV - Single Lumen 03/19/19 1413 Right Hand less than 1 day                Significant Labs:    CBC/Anemia Profile:  Recent Labs   Lab 03/19/19  1415   WBC 5.18   HGB 8.8*   HCT 26.5*   *   MCV 99*   RDW 19.2*        Chemistries:    Recent Labs   Lab 03/19/19  1415      K 2.8*      CO2 22*   BUN 15   CREATININE 1.1   CALCIUM 7.9*   ALBUMIN 3.5   PROT 6.4   BILITOT 1.3*   ALKPHOS 68   ALT 13   AST 32   MG 0.9*         Significant Imaging:    Sacral and pelvic x-ray shows no fracture.

## 2019-03-19 NOTE — ED NOTES
"Pt c/o tailbone pain after having a fall upstairs while visiting her  in ICU. Pt son reports patient weary on her feet right before her fall. Pt states "she fell onto her butt then hit the bedside table with her head and then again on the floor."    Patient moved to ED room 4, patient assisted onto stretcher and changed into a gown. Patient placed on cardiac monitor, continuous pulse oximetry and automatic blood pressure cuff. Bed placed in low locked position, side rails up x 2, call light is within reach of patient or family, orientation to room and explanation of wait provided to family and patient, alarms set and turned on for monitor and pulse ox, awaiting MD evaluation and orders, will continue to monitor.    Patient identifies self as Augusta R Jc      LOC: The patient is awake, alert and aware of environment with an appropriate affect, the patient is oriented x 3 and speaking appropriately.  APPEARANCE: Patient resting comfortably and in no acute distress, patient is clean and well groomed, patient's clothing is properly fastened.  SKIN: The skin is warm and dry, color consistent with ethnicity, patient has normal skin turgor and moist mucus membranes, skin intact, no breakdown or bruising noted. ** no active bleeding or break in skin at impact side on back of head toward base of skull. Pt reports pain with palpation.  MUSCULOSKELETAL: Patient moving all extremities well, no obvious swelling or deformities noted.  RESPIRATORY: Airway is open and patent, respirations are spontaneous, patient has a normal effort and rate, no accessory muscle use noted.  CARDIAC: Patient is sinus tach with occasional PVC, no periphreal edema noted, capillary refill < 3 seconds.  ABDOMEN: Soft and non tender to palpation, no distention noted.  NEUROLOGIC: PERRL, eyes open spontaneously, behavior appropriate to situation, follows commands, facial expression symmetrical, bilateral hand grasp equal and even, purposeful " motor response noted, normal sensation in all extremities when touched with a finger.

## 2019-03-19 NOTE — HPI
Luda Jc is a 68 year old female with DM II, breast cancer and CKD III who presented to the ED after a fall in the ICU while visiting her . She reports getting dizzy, having double vision, loosing her balance, hitting her head on two things and landing on her buttocks. She does not believe she lost consciousness and remembers the event. The patient reports decreased appetite since starting a new oral medication for breast cancer approximately 1-2 months ago. She denies fever and cough. Today, she reports only eating a small amount of lasagna around 11 am. She reports taking 1000 mg of metformin, 2 mg of glimepiride, 34 units of Lantus and 10 units of Novolog prior to eating. Initially, the patient's blood glucose was 29. Despite treatment with D50, subsequent measurements were 32, 96, 75 and most recently at 1657, 50. Labs in the ED were significant for a potassium of 2.8, calcium of 7.9 and magnesium of 0.9. Code status was discussed with the patient. She is a full code. Her , Remigio Jc, is her surrogate medical decision maker. If he is unable to make decisions due to his current illness, her two sons are her secondary surrogate decision makers.

## 2019-03-19 NOTE — HPI
68-year-old female patient was sitting family member in ICU today when she had a fall with head trauma.  She felt dizzy and lightheaded prior to the event.  Patient fingerstick was 29.  Has history of right-sided breast CA status post definitive surgery with adjuvant chemo therapy and radiation 2014.  November/ 2018 left breast lumpectomy for breast CA currently on Xeloda adjuvant therapy.

## 2019-03-19 NOTE — ED PROVIDER NOTES
SCRIBE #1 NOTE: I, Maritza Suresh, am scribing for, and in the presence of, Rojas Anaya MD. I have scribed the entire note.       History     Chief Complaint   Patient presents with    Fall     pt fell 2x upstairs when visiting  in ICU/ reports hitting head 2x on way down. feels weak     Review of patient's allergies indicates:  No Known Allergies      History of Present Illness     HPI    3/19/2019, 2:08 PM  History obtained from the patient      History of Present Illness: Luda Jc is a 68 y.o. female patient with a PMHx of CKD, breast CA, DM, and HTN who presents to the Emergency Department for evaluation following a fall which occurred while pt was visiting family in the ICU. Pt hit her head on a side table and the ground, but she states she landed on her buttocks. She is c/o pain to that area in the buttocks, but denies HA.  Symptoms are episodic and moderate in severity. Pt thinks her BS is low, which caused her to fall. No mitigating or exacerbating factors reported. Associated sxs include dizziness and generalized weakness. Patient denies any fever, chills, cough, CP, SOB, one-sided weakness, numbness, LOC, HA, back pain, neck pain, arthralgias, and all other sxs at this time. Pt's BS in triage was 29. Pt states she took her home medications this AM as prescribed, which includes an insulin shot, Metformin, and glimepiride. No further complaints or concerns at this time.       Arrival mode: Personal vehicle      PCP: Leanne Bonilla DO        Past Medical History:  Past Medical History:   Diagnosis Date    Abnormal LFTs (liver function tests) 10/11/2014    Arthritis     Chronic kidney disease     Diabetes mellitus, type 2     Encounter for blood transfusion     Fatty liver     CT Abdomen/pelvis 6/24/2015---Fatty infiltration of the liver.    Hyperlipidemia     Hypertension     Insulin long-term use 10/11/2014    Nephrolithiasis     CT Abdomen/pelvis 6/24/2015---right nephrolithiasis.  "   Obesity     Stage II infiltrating ductal carcinoma of breast 6/16/2014    Followed by Dr. Virgen        Past Surgical History:  Past Surgical History:   Procedure Laterality Date    APPENDECTOMY      BREAST LUMPECTOMY Right 2014    COLONOSCOPY N/A 10/9/2018    [facility]    ESOPHAGOGASTRODUODENOSCOPY (EGD) N/A 10/9/2018    [facility] at [facility] END[facility]    TONSILLECTOMY, ADENOIDECTOMY           Family History:  Family History   Problem Relation Age of Onset    Cancer Mother         Stomach    Cancer Father         stomach    Heart disease Father     Stroke Son     Diabetes Paternal Aunt     Kidney disease Neg Hx        Social History:  Social History     Tobacco Use    Smoking status: Never Smoker    Smokeless tobacco: Never Used   Substance and Sexual Activity    Alcohol use: Yes     Alcohol/week: 0.0 oz     Comment: " occassionally"    Drug use: No    Sexual activity: No     Partners: Male     Birth control/protection: Post-menopausal        Review of Systems     Review of Systems   Constitutional: Negative for chills, diaphoresis and fever.        (+) generalized weakness  (+) fall   HENT: Negative for congestion, rhinorrhea and sore throat.         (+) head trauma   Respiratory: Negative for cough and shortness of breath.    Cardiovascular: Negative for chest pain.   Gastrointestinal: Negative for abdominal pain, diarrhea, nausea and vomiting.   Genitourinary: Negative for dysuria, frequency and hematuria.   Musculoskeletal: Positive for myalgias (Buttocks pain). Negative for arthralgias, back pain and neck pain.   Skin: Negative for rash.   Neurological: Positive for dizziness. Negative for syncope, weakness, numbness and headaches.   Hematological: Does not bruise/bleed easily.   All other systems reviewed and are negative.       Physical Exam     Initial Vitals [03/19/19 1357]   BP Pulse Resp Temp SpO2   (!) 146/66 109 18 98 °F (36.7 °C) " (!) 93 %      MAP       --          Physical Exam  Nursing Notes and Vital Signs Reviewed  Constitutional: Patient is in no acute distress. Awake and alert. Appropriate for age. Drinking juice  Head: Atraumatic. No facial instability or step-offs.   Eyes: PERRL. EOM normal. Conjunctivae normal.   HENT: Moist mucous membranes. No epistaxis. Patent airway.   Neck: No midline bony tenderness, deformities, or step-offs   Cardiovascular: Regular rate and rhythm. Heart sounds are normal. Intact distal pulses   Pulmonary/Chest: No respiratory distress. Breath sounds are normal. No decreased breath sounds. Chest wall is stable.   Abdominal: Soft and non-distended. Non-tender.   Back: No abrasions or ecchymosis. No midline bony tenderness to the T-spine or L-spine. No deformities or step-offs.   Musculoskeletal: Full range of motion in bilateral extremities. No obvious deformities.   Skin: Normal color. No cyanosis. No lacerations. No abrasions   Neurological: Awake and alert. Appropriate for age. GCS 15. Normal speech. Motor strength is normal at 5/5 bilaterally. Non-focal neurological examination.     ED Course   Critical Care  Date/Time: 3/19/2019 3:04 PM  Performed by: Rojas Anaya MD  Authorized by: Rojas Anaya MD   Direct patient critical care time: 15 minutes  Ordering / reviewing critical care time: 10 minutes  Documentation critical care time: 10 minutes  Total critical care time (exclusive of procedural time) : 35 minutes  Critical care time was exclusive of separately billable procedures and treating other patients and teaching time.  Critical care was necessary to treat or prevent imminent or life-threatening deterioration of the following conditions: Hypoglycemia glucose 29 requiring IV dextrose and multiple repeat glucose checks, hypokalemia, hypocalcemia, prolonged QT.  Critical care was time spent personally by me on the following activities: development of treatment plan with patient or surrogate,  "interpretation of cardiac output measurements, evaluation of patient's response to treatment, examination of patient, obtaining history from patient or surrogate, ordering and performing treatments and interventions, ordering and review of laboratory studies, ordering and review of radiographic studies, pulse oximetry, re-evaluation of patient's condition, review of old charts, blood draw for specimens and discussions with consultants.        ED Vital Signs:  Vitals:    03/19/19 1357 03/19/19 1400 03/19/19 1415 03/19/19 1449   BP: (!) 146/66   (!) 157/74   Pulse: 109  108 105   Resp: 18   20   Temp: 98 °F (36.7 °C)      TempSrc: Oral      SpO2: (!) 93%   99%   Weight:  69 kg (152 lb 1.9 oz)     Height: 4' 11" (1.499 m)       03/19/19 1500   BP: (!) 142/69   Pulse: 102   Resp: 18   Temp:    TempSrc:    SpO2: 96%   Weight:    Height:        Abnormal Lab Results:  Labs Reviewed   CBC W/ AUTO DIFFERENTIAL - Abnormal; Notable for the following components:       Result Value    RBC 2.69 (*)     Hemoglobin 8.8 (*)     Hematocrit 26.5 (*)     MCV 99 (*)     MCH 32.7 (*)     RDW 19.2 (*)     Platelets 117 (*)     Lymph # 0.7 (*)     Gran% 78.2 (*)     Lymph% 12.7 (*)     All other components within normal limits   COMPREHENSIVE METABOLIC PANEL - Abnormal; Notable for the following components:    Potassium 2.8 (*)     CO2 22 (*)     Glucose 32 (*)     Calcium 7.9 (*)     Total Bilirubin 1.3 (*)     eGFR if  59 (*)     eGFR if non  51 (*)     All other components within normal limits    Narrative:       glucose critical result(s) called and verbal readback obtained from   temo delaney rn, 03/19/2019 14:59   MAGNESIUM - Abnormal; Notable for the following components:    Magnesium 0.9 (*)     All other components within normal limits    Narrative:        MAGNESIUM critical result(s) called and verbal readback obtained   from JODIE PRESTON RN, 03/19/2019 15:24   POCT GLUCOSE - Abnormal; Notable for " the following components:    POCT Glucose 29 (*)     All other components within normal limits   MAGNESIUM   URINALYSIS, REFLEX TO URINE CULTURE   POCT GLUCOSE   POCT GLUCOSE   POCT GLUCOSE MONITORING CONTINUOUS   POCT GLUCOSE MONITORING CONTINUOUS        All Lab Results:  Results for orders placed or performed during the hospital encounter of 03/19/19   CBC auto differential   Result Value Ref Range    WBC 5.18 3.90 - 12.70 K/uL    RBC 2.69 (L) 4.00 - 5.40 M/uL    Hemoglobin 8.8 (L) 12.0 - 16.0 g/dL    Hematocrit 26.5 (L) 37.0 - 48.5 %    MCV 99 (H) 82 - 98 fL    MCH 32.7 (H) 27.0 - 31.0 pg    MCHC 33.2 32.0 - 36.0 g/dL    RDW 19.2 (H) 11.5 - 14.5 %    Platelets 117 (L) 150 - 350 K/uL    MPV 10.5 9.2 - 12.9 fL    Gran # (ANC) 4.1 1.8 - 7.7 K/uL    Lymph # 0.7 (L) 1.0 - 4.8 K/uL    Mono # 0.4 0.3 - 1.0 K/uL    Eos # 0.1 0.0 - 0.5 K/uL    Baso # 0.01 0.00 - 0.20 K/uL    Gran% 78.2 (H) 38.0 - 73.0 %    Lymph% 12.7 (L) 18.0 - 48.0 %    Mono% 7.9 4.0 - 15.0 %    Eosinophil% 1.0 0.0 - 8.0 %    Basophil% 0.2 0.0 - 1.9 %    Differential Method Automated    Comprehensive metabolic panel   Result Value Ref Range    Sodium 141 136 - 145 mmol/L    Potassium 2.8 (L) 3.5 - 5.1 mmol/L    Chloride 103 95 - 110 mmol/L    CO2 22 (L) 23 - 29 mmol/L    Glucose 32 (LL) 70 - 110 mg/dL    BUN, Bld 15 8 - 23 mg/dL    Creatinine 1.1 0.5 - 1.4 mg/dL    Calcium 7.9 (L) 8.7 - 10.5 mg/dL    Total Protein 6.4 6.0 - 8.4 g/dL    Albumin 3.5 3.5 - 5.2 g/dL    Total Bilirubin 1.3 (H) 0.1 - 1.0 mg/dL    Alkaline Phosphatase 68 55 - 135 U/L    AST 32 10 - 40 U/L    ALT 13 10 - 44 U/L    Anion Gap 16 8 - 16 mmol/L    eGFR if African American 59 (A) >60 mL/min/1.73 m^2    eGFR if non African American 51 (A) >60 mL/min/1.73 m^2   Magnesium   Result Value Ref Range    Magnesium 0.9 (LL) 1.6 - 2.6 mg/dL   POCT glucose   Result Value Ref Range    POCT Glucose 29 (LL) 70 - 110 mg/dL   POCT glucose   Result Value Ref Range    POCT Glucose 96 70 - 110 mg/dL    POCT glucose   Result Value Ref Range    POCT Glucose 75 70 - 110 mg/dL       Imaging Results:  Imaging Results          X-Ray Pelvis Routine AP (Final result)  Result time 03/19/19 14:55:57    Final result by Sae Mills MD (03/19/19 14:55:57)                 Impression:      Degenerative changes.      Electronically signed by: Sae Mills MD  Date:    03/19/2019  Time:    14:55             Narrative:    EXAMINATION:  XR PELVIS ROUTINE AP    CLINICAL HISTORY:  - Pelvic and perineal pain.    COMPARISON:  None    FINDINGS:  Mild bilateral sacroiliac DJD.  No acute fracture or dislocation.  No enthesopathic changes at the bilateral iliac wings.  Alignment is satisfactory.                               X-Ray Sacrum And Coccyx (Final result)  Result time 03/19/19 14:56:21    Final result by Sae Mills MD (03/19/19 14:56:21)                 Impression:      No acute findings.      Electronically signed by: Sae Mills MD  Date:    03/19/2019  Time:    14:56             Narrative:    EXAMINATION:  XR SACRUM AND COCCYX    CLINICAL HISTORY:  - Pelvic and perineal pain.    COMPARISON:  None    FINDINGS:  Mild bilateral sacroiliac DJD.  No acute fracture or dislocation.                                 The EKG was ordered, reviewed, and independently interpreted by the ED provider.  Interpretation time: 14:03  Rate: 113 BPM  Rhythm: sinus tachycardia w/ occasional PVCs  Interpretation: Normal WA intervals. Normal QRS intervals. Prolonged . No significant ST depression or elevation. No STEMI.             The Emergency Provider reviewed the vital signs and test results, which are outlined above.     ED Discussion     3:05 PM: Re-evaluated pt. Pt is resting comfortably and is in no acute distress.  D/w pt all pertinent results. D/w pt any concerns expressed at this time. Answered all questions. Pt expresses understanding at this time.     Repeat glucose after IV dextrose and food was > 100. Next repeat glucose in the  70's. Patient given more food and will need to be placed in the hospital given the long acting sulfonylurea effects    3:26 PM: Discussed case with DEA Epperson ([facility]). Dr. Dominguez agrees with current care and management of pt and accepts admission.   Admitting Service: Hospital Medicine  Admitting Physician: Dr. Dominguez  Admit to: Obs/ICU    3:33 PM: Re-evaluated pt. I have discussed test results, shared treatment plan, and the need for admission with patient and family at bedside. Pt and family express understanding at this time and agree with all information. All questions answered. Pt and family have no further questions or concerns at this time. Pt is ready for admit.    ED Medication(s):  Medications   dextrose 50% (D50W) solution (not administered)   potassium chloride 10 mEq in 100 mL IVPB (10 mEq Intravenous New Bag 3/19/19 1532)   calcium gluconate 1 g in dextrose 5 % 100 mL IVPB (1 g Intravenous New Bag 3/19/19 1532)   magnesium sulfate 2g in water 50mL IVPB (premix) (not administered)   dextrose 50% (D50W) solution 25 g (25 g Intravenous New Bag 3/19/19 1416)   potassium chloride SA CR tablet 40 mEq (40 mEq Oral Given 3/19/19 1522)       New Prescriptions    No medications on file                 Medical Decision Making:   Clinical Tests:   Lab Tests: Ordered and Reviewed  Radiological Study: Ordered and Reviewed  Medical Tests: Ordered and Reviewed             Scribe Attestation:   Scribe #1: I performed the above scribed service and the documentation accurately describes the services I performed. I attest to the accuracy of the note.     Attending:   Physician Attestation Statement for Scribe #1: I, Rojas Anaya MD, personally performed the services described in this documentation, as scribed by Maritza Suresh, in my presence, and it is both accurate and complete.           Clinical Impression       ICD-10-CM ICD-9-CM   1. Hypoglycemia secondary to sulfonylurea, accidental or  unintentional, initial encounter T38.3X1A 962.3    E16.0 E858.8   2. Pelvic pain R10.2 [id number]   3. Prolonged QT interval R94.31 794.31   4. Hypokalemia E87.6 276.8   5. Hypocalcemia E83.51 275.41   6. Hypomagnesemia E83.42 275.2       Disposition:   Disposition: Admitted  Condition: Bonnie Anaya MD  03/23/19 6321

## 2019-03-19 NOTE — CONSULTS
[facility] -   Critical Care Medicine  Consult Note    Patient Name: Luda Jc  MRN: [id number]  Admission Date: 3/19/2019  Hospital Length of Stay: 0 days  Code Status: Prior  Attending Physician: Yared Dominguez MD   Primary Care Provider: Leanne Bonilla DO   Principal Problem: <principal problem not specified>      Subjective:     HPI:  68-year-old female patient was sitting family member in ICU today when she had a fall with head trauma.  She felt dizzy and lightheaded prior to the event.  Patient fingerstick was 29.  Has history of right-sided breast CA status post definitive surgery with adjuvant chemo therapy and radiation 2014.  November/ 2018 left breast lumpectomy for breast CA currently on Xeloda adjuvant therapy.    Hospital/ICU Course:  Patient was found to be hypoglycemic with a fingerstick of 29, she received 1 amp of D50, repeat fingerstick was 96 than 75.  Found to be hypokalemic received potassium supplement.  She is on glimepiride, metformin, Lantus and NovoLog as outpatient.      Past Medical History:   Diagnosis Date    Abnormal LFTs (liver function tests) 10/11/2014    Arthritis     Chronic kidney disease     Diabetes mellitus, type 2     Encounter for blood transfusion     Fatty liver     CT Abdomen/pelvis 6/24/2015---Fatty infiltration of the liver.    Hyperlipidemia     Hypertension     Insulin long-term use 10/11/2014    Nephrolithiasis     CT Abdomen/pelvis 6/24/2015---right nephrolithiasis.    Obesity     Stage II infiltrating ductal carcinoma of breast 6/16/2014    Followed by Dr. Virgen        Past Surgical History:   Procedure Laterality Date    APPENDECTOMY      BREAST LUMPECTOMY Right 2014    COLONOSCOPY N/A 10/9/2018    [facility]formed [facility]    ESOPHAGOGASTRODUODENOSCOPY (EGD) N/A 10/9/2018    Performed by [facility] at [facility] END[facility]    TONSILLECTOMY, ADENOIDECTOMY         Review of patient's allergies indicates:  No  "Known Allergies    Family History     Problem Relation (Age of Onset)    Cancer Mother, Father    Diabetes Paternal Aunt    Heart disease Father    Stroke Son        Tobacco Use    Smoking status: Never Smoker    Smokeless tobacco: Never Used   Substance and Sexual Activity    Alcohol use: Yes     Alcohol/week: 0.0 oz     Comment: " occassionally"    Drug use: No    Sexual activity: No     Partners: Male     Birth control/protection: Post-menopausal         Review of Systems   Constitutional: Positive for fatigue. Negative for chills and fever.   HENT: Negative for nosebleeds.    Eyes: Negative for discharge.   Respiratory: Negative for choking.    Gastrointestinal: Negative for blood in stool.   Endocrine:        Diabetes mellitus type 2   Genitourinary: Negative for hematuria.        Right and left breast CA status post surgery and adjuvant chemotherapy 2014 and 2018   Musculoskeletal: Negative for neck stiffness.        Fall   Skin: Negative for rash.   Allergic/Immunologic: Positive for immunocompromised state.   Neurological: Positive for dizziness and light-headedness. Negative for seizures.   Hematological: Negative for adenopathy.   Psychiatric/Behavioral: Negative for behavioral problems.     Objective:     Vital Signs (Most Recent):  Temp: 98 °F (36.7 °C) (03/19/19 1357)  Pulse: 102 (03/19/19 1500)  Resp: 18 (03/19/19 1500)  BP: (!) 142/69 (03/19/19 1500)  SpO2: 96 % (03/19/19 1500) Vital Signs (24h Range):  Temp:  [98 °F (36.7 °C)] 98 °F (36.7 °C)  Pulse:  [102-109] 102  Resp:  [18-20] 18  SpO2:  [93 %-99 %] 96 %  BP: (142-157)/(66-74) 142/69     Weight: 69 kg (152 lb 1.9 oz)  Body mass index is 30.72 kg/m².    No intake or output data in the 24 hours ending 03/19/19 1613    Physical Exam   Constitutional: She is oriented to person, place, and time. She appears well-developed and well-nourished. No distress.   HENT:   Head: Normocephalic and atraumatic.   Eyes: EOM are normal.   Neck: Neck supple. "   Cardiovascular: Normal rate and regular rhythm.   Pulmonary/Chest: Effort normal.   Abdominal: Soft. There is no tenderness.   Musculoskeletal: She exhibits no edema.   Neurological: She is alert and oriented to person, place, and time.   Psychiatric: She has a normal mood and affect.   Nursing note and vitals reviewed.      Vents:       Lines/Drains/Airways     Peripheral Intravenous Line                 Peripheral IV - Single Lumen 06/24/15 1040 Right Antecubital 1364 days         Peripheral IV - Single Lumen 10/09/18 0740 Left Antecubital 161 days         Peripheral IV - Single Lumen 03/19/19 1413 Right Hand less than 1 day                Significant Labs:    CBC/Anemia Profile:  Recent Labs   Lab 03/19/19  1415   WBC 5.18   HGB 8.8*   HCT 26.5*   *   MCV 99*   RDW 19.2*        Chemistries:    Recent Labs   Lab 03/19/19  1415      K 2.8*      CO2 22*   BUN 15   CREATININE 1.1   CALCIUM 7.9*   ALBUMIN 3.5   PROT 6.4   BILITOT 1.3*   ALKPHOS 68   ALT 13   AST 32   MG 0.9*         Significant Imaging:   Sacral and pelvic x-ray shows no fracture.      ABG  No results for input(s): PH, PO2, PCO2, HCO3, BE in the last 168 hours.  Assessment/Plan:     Renal/   Hypokalemia    Replete magnesium.  Replete potassium.  Monitor potassium and magnesium levels.  Hold insulin.     Hypomagnesemia    Magnesium level less than 1 mg /dl, replete 4-8 g magnesium in 12-24 hrs. Repeat Mg.      Hematology   Thrombocytopenia    Likely chemo induced. Will give mechanical prophylaxis      Other   Hypoglycemia secondary to sulfonylurea    Fingerstick q.1 hour.  D50 IV as needed.  Keep off oral anti diabetics.         Critical Care Daily Checklist:    A: Awake: RASS Goal/Actual Goal:    Actual:     B: Spontaneous Breathing Trial Performed?     C: SAT & SBT Coordinated?  NA                      D: Delirium: CAM-ICU     E: Early Mobility Performed? Yes   F: Feeding Goal:    Status:     Current Diet Order   Procedures     Diet Adult Regular (IDDSI Level 7)      AS: Analgesia/Sedation No sedation   T: Thromboembolic Prophylaxis Mechanical    H: HOB > 300 Yes   U: Stress Ulcer Prophylaxis (if needed) Not indicated    G: Glucose Control FS q 1 hrs . No insulin . No oral antidiabetics    B: Bowel Function     I: Indwelling Catheter (Lines & Bo) Necessity No fole   D: De-escalation of Antimicrobials/Pharmacotherapies No ABX    Plan for the day/ETD Y    Code Status:  Family/Goals of Care: Prior  Y     Critical Care Time: 35 minutes  Critical secondary to Patient has a condition that poses threat to life and bodily function: severe hypoglycemia/ electrolyte disturbances      Critical care was time spent personally by me on the following activities: development of treatment plan with patient or surrogate and bedside caregivers, discussions with consultants, evaluation of patient's response to treatment, examination of patient, ordering and performing treatments and interventions, ordering and review of laboratory studies, ordering and review of radiographic studies, pulse oximetry, re-evaluation of patient's condition. This critical care time did not overlap with that of any other provider or involve time for any procedures.    Thank you for your consult. I will follow-up with patient. Please contact us if you have any additional questions.     Anderson Orellana MD  Critical Care Medicine  [facility] -

## 2019-03-19 NOTE — SUBJECTIVE & OBJECTIVE
Past Medical History:   Diagnosis Date    Abnormal LFTs (liver function tests) 10/11/2014    Arthritis     Chronic kidney disease     Diabetes mellitus, type 2     Encounter for blood transfusion     Fatty liver     CT Abdomen/pelvis 6/24/2015---Fatty infiltration of the liver.    Hyperlipidemia     Hypertension     Insulin long-term use 10/11/2014    Nephrolithiasis     CT Abdomen/pelvis 6/24/2015---right nephrolithiasis.    Obesity     Stage II infiltrating ductal carcinoma of breast 6/16/2014    Followed by Dr. Virgen        Past Surgical History:   Procedure Laterality Date    APPENDECTOMY      BREAST LUMPECTOMY Right 2014    COLONOSCOPY N/A 10/9/2018    [facility]formed [facility] END[facility]    ESOPHAGOGASTRODUODENOSCOPY (EGD) N/A 10/9/2018    Performed by [facility] at [facility] ENDO    TONSILLECTOMY, ADENOIDECTOMY         Review of patient's allergies indicates:  No Known Allergies    No current facility-administered medications on file prior to encounter.      Current Outpatient Medications on File Prior to Encounter   Medication Sig    amLODIPine (NORVASC) 10 MG tablet TAKE 1 TABLET BY MOUTH ONCE DAILY    atorvastatin (LIPITOR) 80 MG tablet TAKE 1 TABLET BY MOUTH ONCE DAILY    capecitabine (XELODA) 500 MG Tab Take 500 mg by mouth 2 (two) times daily.    fenofibrate micronized (LOFIBRA) 200 MG Cap TAKE 1 CAPSULE BY MOUTH ONCE DAILY WITH BREAKFAST    glimepiride (AMARYL) 2 MG tablet TAKE ONE TABLET BY MOUTH ONCE DAILY WITH  BREAKFAST    insulin (LANTUS SOLOSTAR U-100 INSULIN) glargine 100 units/mL (3mL) SubQ pen INJECT 34 UNITS SUBCUTANEOUSLY IN THE MORNING AND 30 IN THE EVENING    insulin aspart U-100 (NOVOLOG FLEXPEN U-100 INSULIN) 100 unit/mL InPn pen INJECT 10 UNITS SUBCUTANEOUSLY THREE TIMES DAILY WITH MEALS    lisinopril (PRINIVIL,ZESTRIL) 40 MG tablet TAKE 1 TABLET BY MOUTH TWICE DAILY    metFORMIN (GLUCOPHAGE) 1000 MG tablet TAKE 1 TABLET BY MOUTH TWICE DAILY WITH  "MEALS    pantoprazole (PROTONIX) 40 MG tablet Take 1 tablet (40 mg total) by mouth once daily.    ACCU-CHEK NICOLLE PLUS TEST STRP Strp USE ONE STRIP TO CHECK GLUCOSE 2 TO 3 TIMES DAILY AS DIRECTED    ACCU-CHEK SOFT DEV LANCETS Kit USE AS DIRECTED    blood-glucose meter kit Use as instructed    chlorthalidone (HYGROTEN) 50 MG Tab TAKE ONE TABLET BY MOUTH ONCE DAILY    cyclobenzaprine (FLEXERIL) 5 MG tablet Take 1 tablet (5 mg total) by mouth nightly.    diphenoxylate-atropine 2.5-0.025 mg (LOMOTIL) 2.5-0.025 mg per tablet Take 2.5 tablets by mouth.    lancets (ACCU-CHEK SOFTCLIX LANCETS) Misc USE AS DIRECTED TWICE DAILY    metoprolol succinate (TOPROL-XL) 25 MG 24 hr tablet Take 1 tablet (25 mg total) by mouth once daily.    omega-3 fatty acids-vitamin E (FISH OIL) 1,000 mg Cap Take two capsules daily.    pen needle, diabetic (BD ULTRA-FINE FATEMEH PEN NEEDLES) 32 gauge x 5/32" Ndle Use once daily. 250.00 IDDM    tamoxifen (NOLVADEX) 20 MG Tab     triamcinolone acetonide 0.1% (KENALOG) 0.1 % cream Apply topically 3 (three) times daily.     Family History     Problem Relation (Age of Onset)    Cancer Mother, Father    Diabetes Paternal Aunt    Heart disease Father    Stroke Son        Tobacco Use    Smoking status: Never Smoker    Smokeless tobacco: Never Used   Substance and Sexual Activity    Alcohol use: Yes     Alcohol/week: 0.0 oz     Comment: " occassionally"    Drug use: No    Sexual activity: No     Partners: Male     Birth control/protection: Post-menopausal     Review of Systems   Constitutional: Positive for appetite change (decrease). Negative for chills, diaphoresis, fatigue and fever.   HENT: Negative for congestion, ear pain, mouth sores, sore throat and trouble swallowing.    Eyes: Positive for visual disturbance. Negative for pain.   Respiratory: Negative for cough, chest tightness and shortness of breath.    Cardiovascular: Negative for chest pain, palpitations and leg swelling. "   Gastrointestinal: Negative for abdominal pain, constipation and nausea.   Endocrine: Negative for cold intolerance, heat intolerance, polydipsia and polyuria.   Genitourinary: Negative for dysuria, frequency and hematuria.   Musculoskeletal: Negative for arthralgias, back pain, myalgias and neck pain.   Skin: Negative for pallor, rash and wound.   Allergic/Immunologic: Negative for environmental allergies and immunocompromised state.   Neurological: Positive for dizziness. Negative for seizures, syncope, weakness, numbness and headaches.   Hematological: Negative for adenopathy. Does not bruise/bleed easily.   Psychiatric/Behavioral: Negative for agitation, confusion and sleep disturbance.     Objective:     Vital Signs (Most Recent):  Temp: 98.5 °F (36.9 °C) (03/19/19 1630)  Pulse: 109 (03/19/19 1630)  Resp: (!) 23 (03/19/19 1630)  BP: (!) 178/73 (03/19/19 1630)  SpO2: 95 % (03/19/19 1630) Vital Signs (24h Range):  Temp:  [98 °F (36.7 °C)-98.5 °F (36.9 °C)] 98.5 °F (36.9 °C)  Pulse:  [102-109] 109  Resp:  [18-23] 23  SpO2:  [93 %-99 %] 95 %  BP: (142-178)/(66-74) 178/73     Weight: 64.7 kg (142 lb 10.2 oz)  Body mass index is 28.81 kg/m².    Physical Exam   Constitutional: She is oriented to person, place, and time. She appears well-developed and well-nourished. No distress.   HENT:   Head: Normocephalic and atraumatic.   Eyes: Conjunctivae are normal.   PERRL   Neck: Neck supple. No JVD present.   Cardiovascular: Regular rhythm and normal heart sounds. Tachycardia present.   Pulmonary/Chest: Effort normal and breath sounds normal.   Abdominal: Soft. Bowel sounds are normal. She exhibits no distension. There is no tenderness.   Musculoskeletal: Normal range of motion. She exhibits edema (trace bilateral lower extremities).   Neurological: She is alert and oriented to person, place, and time.   Skin: Skin is warm and dry.   Psychiatric: She has a normal mood and affect. Her behavior is normal. Thought content  normal.   Nursing note and vitals reviewed.          Significant Labs:   CBC:   Recent Labs   Lab 03/19/19  1415   WBC 5.18   HGB 8.8*   HCT 26.5*   *     CMP:   Recent Labs   Lab 03/19/19  1415      K 2.8*      CO2 22*   GLU 32*   BUN 15   CREATININE 1.1   CALCIUM 7.9*   PROT 6.4   ALBUMIN 3.5   BILITOT 1.3*   ALKPHOS 68   AST 32   ALT 13   ANIONGAP 16   EGFRNONAA 51*     All pertinent labs within the past 24 hours have been reviewed.    Significant Imaging: I have reviewed all pertinent imaging results/findings within the past 24 hours.   Imaging Results          X-Ray Pelvis Routine AP (Final result)  Result time 03/19/19 14:55:57    Final result by Sae Mills MD (03/19/19 14:55:57)                 Impression:      Degenerative changes.      Electronically signed by: Sae Mills MD  Date:    03/19/2019  Time:    14:55             Narrative:    EXAMINATION:  XR PELVIS ROUTINE AP    CLINICAL HISTORY:  - Pelvic and perineal pain.    COMPARISON:  None    FINDINGS:  Mild bilateral sacroiliac DJD.  No acute fracture or dislocation.  No enthesopathic changes at the bilateral iliac wings.  Alignment is satisfactory.                               X-Ray Sacrum And Coccyx (Final result)  Result time 03/19/19 14:56:21    Final result by Sae Mills MD (03/19/19 14:56:21)                 Impression:      No acute findings.      Electronically signed by: Sae Mills MD  Date:    03/19/2019  Time:    14:56             Narrative:    EXAMINATION:  XR SACRUM AND COCCYX    CLINICAL HISTORY:  - Pelvic and perineal pain.    COMPARISON:  None    FINDINGS:  Mild bilateral sacroiliac DJD.  No acute fracture or dislocation.

## 2019-03-19 NOTE — ASSESSMENT & PLAN NOTE
-Hold metformin, glimepiride, Lantus and Novolog.   -D5 drip.   -Frequent accuchecks.   -Resume diet.   -Discussed the importance of adequate intake, especially after taking medications. Further discussed the possible need for medication adjustments upon discharge.

## 2019-03-19 NOTE — NURSING
1600 Receive telephone report from JOANNE Soto. Pt arrive to unit @1615, A&Ox4, c/o pain to left side of buttock. Pt able to ambulate to bed with stand-by assist. Head-to-toe assessment complete, skin intact, PIV to R hand infusing KCl 10mEq, Magnesium sulfate 2G, & calcium gluconate. Check pt glucose, reading <50, admin orange juice with 2 packs sugar, will reassess. Pt call son to have her home medication capecitabine 500mg brought to hospital. Pt states she is to have a dose @[facility] & in the AM, call pharmacy to check if the medication needs to be verified. Spoke with pharmacist, Mary, who states oncology has to be consulted to restart pt home med. Will securechat Dr Dominguez to notify. Pt asking for home pain med, admin pain med per MAR. Will continue to monitor pt. Marie Valencia RN  0305 Bedside report given to JOANNE Chun. Marie Valencia RN

## 2019-03-19 NOTE — ASSESSMENT & PLAN NOTE
-Most likely contributing to patient's inability to clear diabetic medications causing hypoglycemia.   -Creatinine at baseline.   -Monitor.

## 2019-03-19 NOTE — HOSPITAL COURSE
Patient was found to be hypoglycemic with a fingerstick of 29, she received 1 amp of D50, repeat fingerstick was 96 than 75.  Found to be hypokalemic received potassium supplement.  She is on glimepiride, metformin, Lantus and NovoLog as outpatient.  3/20 seen and examined, no complaints , afebrile , hypoglycemic overnight FS 69.

## 2019-03-20 VITALS
TEMPERATURE: 98 F | DIASTOLIC BLOOD PRESSURE: 49 MMHG | WEIGHT: 142.63 LBS | HEART RATE: 89 BPM | HEIGHT: 59 IN | SYSTOLIC BLOOD PRESSURE: 143 MMHG | RESPIRATION RATE: 25 BRPM | OXYGEN SATURATION: 96 % | BODY MASS INDEX: 28.76 KG/M2

## 2019-03-20 PROBLEM — E44.1 MILD MALNUTRITION: Status: ACTIVE | Noted: 2019-03-20

## 2019-03-20 LAB
ANION GAP SERPL CALC-SCNC: 10 MMOL/L
BASOPHILS # BLD AUTO: 0.02 K/UL
BASOPHILS NFR BLD: 0.6 %
BILIRUB UR QL STRIP: NEGATIVE
BUN SERPL-MCNC: 13 MG/DL
CALCIUM SERPL-MCNC: 8.1 MG/DL
CHLORIDE SERPL-SCNC: 111 MMOL/L
CLARITY UR: CLEAR
CO2 SERPL-SCNC: 21 MMOL/L
COLOR UR: YELLOW
CREAT SERPL-MCNC: 1 MG/DL
DIFFERENTIAL METHOD: ABNORMAL
EOSINOPHIL # BLD AUTO: 0.1 K/UL
EOSINOPHIL NFR BLD: 2.5 %
ERYTHROCYTE [DISTWIDTH] IN BLOOD BY AUTOMATED COUNT: 19.4 %
EST. GFR  (AFRICAN AMERICAN): >60 ML/MIN/1.73 M^2
EST. GFR  (NON AFRICAN AMERICAN): 58 ML/MIN/1.73 M^2
GLUCOSE SERPL-MCNC: 82 MG/DL
GLUCOSE UR QL STRIP: NEGATIVE
HCT VFR BLD AUTO: 23.4 %
HGB BLD-MCNC: 7.6 G/DL
HGB UR QL STRIP: NEGATIVE
KETONES UR QL STRIP: NEGATIVE
LEUKOCYTE ESTERASE UR QL STRIP: NEGATIVE
LYMPHOCYTES # BLD AUTO: 0.5 K/UL
LYMPHOCYTES NFR BLD: 16.6 %
MAGNESIUM SERPL-MCNC: 1.3 MG/DL
MCH RBC QN AUTO: 32.6 PG
MCHC RBC AUTO-ENTMCNC: 32.5 G/DL
MCV RBC AUTO: 100 FL
MONOCYTES # BLD AUTO: 0.3 K/UL
MONOCYTES NFR BLD: 8.9 %
NEUTROPHILS # BLD AUTO: 2.3 K/UL
NEUTROPHILS NFR BLD: 71.4 %
NITRITE UR QL STRIP: NEGATIVE
PH UR STRIP: 5 [PH] (ref 5–8)
PHOSPHATE SERPL-MCNC: 2.5 MG/DL
PLATELET # BLD AUTO: 102 K/UL
PMV BLD AUTO: 11.1 FL
POCT GLUCOSE: 100 MG/DL (ref 70–110)
POCT GLUCOSE: 141 MG/DL (ref 70–110)
POCT GLUCOSE: 160 MG/DL (ref 70–110)
POCT GLUCOSE: 201 MG/DL (ref 70–110)
POCT GLUCOSE: 381 MG/DL (ref 70–110)
POCT GLUCOSE: 57 MG/DL (ref 70–110)
POCT GLUCOSE: 63 MG/DL (ref 70–110)
POCT GLUCOSE: 70 MG/DL (ref 70–110)
POCT GLUCOSE: 87 MG/DL (ref 70–110)
POCT GLUCOSE: 90 MG/DL (ref 70–110)
POCT GLUCOSE: 91 MG/DL (ref 70–110)
POCT GLUCOSE: 92 MG/DL (ref 70–110)
POTASSIUM SERPL-SCNC: 5.1 MMOL/L
PROT UR QL STRIP: ABNORMAL
RBC # BLD AUTO: 2.33 M/UL
SODIUM SERPL-SCNC: 142 MMOL/L
SP GR UR STRIP: <=1.005 (ref 1–1.03)
URN SPEC COLLECT METH UR: ABNORMAL
UROBILINOGEN UR STRIP-ACNC: NEGATIVE EU/DL
WBC # BLD AUTO: 3.26 K/UL

## 2019-03-20 PROCEDURE — 36415 COLL VENOUS BLD VENIPUNCTURE: CPT | Mod: HCNC

## 2019-03-20 PROCEDURE — 84100 ASSAY OF PHOSPHORUS: CPT | Mod: HCNC

## 2019-03-20 PROCEDURE — G0378 HOSPITAL OBSERVATION PER HR: HCPCS | Mod: HCNC

## 2019-03-20 PROCEDURE — 83036 HEMOGLOBIN GLYCOSYLATED A1C: CPT | Mod: HCNC

## 2019-03-20 PROCEDURE — 80048 BASIC METABOLIC PNL TOTAL CA: CPT | Mod: HCNC

## 2019-03-20 PROCEDURE — 99291 PR CRITICAL CARE, E/M 30-74 MINUTES: ICD-10-PCS | Mod: HCNC,,, | Performed by: INTERNAL MEDICINE

## 2019-03-20 PROCEDURE — 83735 ASSAY OF MAGNESIUM: CPT | Mod: HCNC

## 2019-03-20 PROCEDURE — 63600175 PHARM REV CODE 636 W HCPCS: Mod: HCNC | Performed by: INTERNAL MEDICINE

## 2019-03-20 PROCEDURE — 97802 MEDICAL NUTRITION INDIV IN: CPT | Mod: HCNC

## 2019-03-20 PROCEDURE — 85025 COMPLETE CBC W/AUTO DIFF WBC: CPT | Mod: HCNC

## 2019-03-20 PROCEDURE — 99205 PR OFFICE/OUTPT VISIT, NEW, LEVL V, 60-74 MIN: ICD-10-PCS | Mod: HCNC,,, | Performed by: INTERNAL MEDICINE

## 2019-03-20 PROCEDURE — 63600175 PHARM REV CODE 636 W HCPCS: Mod: HCNC | Performed by: NURSE PRACTITIONER

## 2019-03-20 PROCEDURE — 25000003 PHARM REV CODE 250: Mod: HCNC | Performed by: PHYSICIAN ASSISTANT

## 2019-03-20 PROCEDURE — 99291 CRITICAL CARE FIRST HOUR: CPT | Mod: HCNC,,, | Performed by: INTERNAL MEDICINE

## 2019-03-20 PROCEDURE — 96366 THER/PROPH/DIAG IV INF ADDON: CPT | Performed by: EMERGENCY MEDICINE

## 2019-03-20 PROCEDURE — 96372 THER/PROPH/DIAG INJ SC/IM: CPT | Mod: 59 | Performed by: EMERGENCY MEDICINE

## 2019-03-20 PROCEDURE — 99205 OFFICE O/P NEW HI 60 MIN: CPT | Mod: HCNC,,, | Performed by: INTERNAL MEDICINE

## 2019-03-20 RX ORDER — METFORMIN HYDROCHLORIDE 1000 MG/1
500 TABLET ORAL 2 TIMES DAILY WITH MEALS
Qty: 180 TABLET | Refills: 1 | Status: SHIPPED | OUTPATIENT
Start: 2019-03-20 | End: 2020-03-02 | Stop reason: SDUPTHER

## 2019-03-20 RX ORDER — INSULIN ASPART 100 [IU]/ML
1-10 INJECTION, SOLUTION INTRAVENOUS; SUBCUTANEOUS
Status: DISCONTINUED | OUTPATIENT
Start: 2019-03-20 | End: 2019-03-20 | Stop reason: HOSPADM

## 2019-03-20 RX ORDER — IBUPROFEN 200 MG
16 TABLET ORAL
Status: DISCONTINUED | OUTPATIENT
Start: 2019-03-20 | End: 2019-03-20 | Stop reason: HOSPADM

## 2019-03-20 RX ORDER — MAGNESIUM SULFATE HEPTAHYDRATE 40 MG/ML
2 INJECTION, SOLUTION INTRAVENOUS ONCE
Status: COMPLETED | OUTPATIENT
Start: 2019-03-20 | End: 2019-03-20

## 2019-03-20 RX ORDER — IBUPROFEN 200 MG
24 TABLET ORAL
Status: DISCONTINUED | OUTPATIENT
Start: 2019-03-20 | End: 2019-03-20 | Stop reason: HOSPADM

## 2019-03-20 RX ORDER — GLUCAGON 1 MG
1 KIT INJECTION
Status: DISCONTINUED | OUTPATIENT
Start: 2019-03-20 | End: 2019-03-20 | Stop reason: HOSPADM

## 2019-03-20 RX ORDER — OCTREOTIDE ACETATE 50 UG/ML
50 INJECTION, SOLUTION INTRAVENOUS; SUBCUTANEOUS EVERY 6 HOURS
Status: DISCONTINUED | OUTPATIENT
Start: 2019-03-20 | End: 2019-03-20

## 2019-03-20 RX ORDER — METFORMIN HYDROCHLORIDE 1000 MG/1
500 TABLET ORAL 2 TIMES DAILY WITH MEALS
Qty: 180 TABLET | Refills: 1 | Status: CANCELLED | OUTPATIENT
Start: 2019-03-20

## 2019-03-20 RX ADMIN — ONDANSETRON 8 MG: 8 TABLET, ORALLY DISINTEGRATING ORAL at 02:03

## 2019-03-20 RX ADMIN — LISINOPRIL 40 MG: 20 TABLET ORAL at 08:03

## 2019-03-20 RX ADMIN — OCTREOTIDE ACETATE 50 MCG: 50 INJECTION, SOLUTION INTRAVENOUS; SUBCUTANEOUS at 12:03

## 2019-03-20 RX ADMIN — HYDROCODONE BITARTRATE AND ACETAMINOPHEN 1 TABLET: 5; 325 TABLET ORAL at 02:03

## 2019-03-20 RX ADMIN — PANTOPRAZOLE SODIUM 40 MG: 40 TABLET, DELAYED RELEASE ORAL at 08:03

## 2019-03-20 RX ADMIN — MAGNESIUM OXIDE TAB 400 MG (241.3 MG ELEMENTAL MG) 400 MG: 400 (241.3 MG) TAB at 08:03

## 2019-03-20 RX ADMIN — ATORVASTATIN CALCIUM 80 MG: 40 TABLET, FILM COATED ORAL at 08:03

## 2019-03-20 RX ADMIN — MAGNESIUM SULFATE IN WATER 2 G: 40 INJECTION, SOLUTION INTRAVENOUS at 07:03

## 2019-03-20 RX ADMIN — AMLODIPINE BESYLATE 10 MG: 10 TABLET ORAL at 08:03

## 2019-03-20 RX ADMIN — INSULIN ASPART 8 UNITS: 100 INJECTION, SOLUTION INTRAVENOUS; SUBCUTANEOUS at 02:03

## 2019-03-20 RX ADMIN — OCTREOTIDE ACETATE 50 MCG: 50 INJECTION, SOLUTION INTRAVENOUS; SUBCUTANEOUS at 07:03

## 2019-03-20 NOTE — ASSESSMENT & PLAN NOTE
Replete magnesium.  Replete potassium.  Monitor potassium and magnesium levels.  Hold insulin.  3/20 K 5.1 , monitor level .

## 2019-03-20 NOTE — PLAN OF CARE
03/20/19 1357   Post-Acute Status   Post-Acute Authorization Home Health/Hospice   Home Health/Hospice Status Referrals Sent

## 2019-03-20 NOTE — PLAN OF CARE
Problem: Adult Inpatient Plan of Care  Goal: Plan of Care Review  Outcome: Ongoing (interventions implemented as appropriate)  Recommendations     Recommendation: 1. Consider changing diet to diabetic cardiac. 2. Add boost glucose control TID.  3. Will continue to monitor.   Intervention: Provided diabetic diet education w/ handouts from the Nutrition Care Manual. Pt verbalized understanding. RD contact info provided.   Goals: Meet > 85  % EEN/EPN while admitted  Nutrition Goal Status: new  Communication of RD Recs: (POC, sticky note)

## 2019-03-20 NOTE — PROGRESS NOTES
[facility] -   Critical Care Medicine  Progress Note    Patient Name: Luda Jc  MRN: [id number]  Admission Date: 3/19/2019  Hospital Length of Stay: 0 days  Code Status: Full Code  Attending Provider: Yared Dominguez MD  Primary Care Provider: Leanne Bonilla DO   Principal Problem: Hypoglycemia secondary to sulfonylurea    Subjective:     HPI:  68-year-old female patient was sitting family member in ICU today when she had a fall with head trauma.  She felt dizzy and lightheaded prior to the event.  Patient fingerstick was 29.  Has history of right-sided breast CA status post definitive surgery with adjuvant chemo therapy and radiation 2014.  November/ 2018 left breast lumpectomy for breast CA currently on Xeloda adjuvant therapy.    Hospital/ICU Course:  Patient was found to be hypoglycemic with a fingerstick of 29, she received 1 amp of D50, repeat fingerstick was 96 than 75.  Found to be hypokalemic received potassium supplement.  She is on glimepiride, metformin, Lantus and NovoLog as outpatient.  3/20 seen and examined, no complaints , afebrile , hypoglycemic overnight FS 69.       Interval History:   Review of Systems   Constitutional: Positive for malaise/fatigue. Negative for chills and fever.   HENT: Negative for hearing loss and nosebleeds.    Eyes: Negative for discharge.   Respiratory: Negative for cough and shortness of breath.    Cardiovascular: Negative for chest pain and leg swelling.   Gastrointestinal: Negative for abdominal pain.   Genitourinary: Negative for hematuria.   Musculoskeletal: Negative for falls.        Fall    Skin: Negative for itching.   Neurological: Positive for weakness. Negative for speech change, seizures and loss of consciousness.   Endo/Heme/Allergies: Negative for polydipsia. Does not bruise/bleed easily.        DM2    Psychiatric/Behavioral: Negative for hallucinations.       Objective:     Vital Signs (Most Recent):  Temp: 98 °F (36.7 °C) (03/20/19  0305)  Pulse: 100 (03/20/19 0805)  Resp: (!) 22 (03/20/19 0805)  BP: (!) 155/57 (03/20/19 0805)  SpO2: 99 % (03/20/19 0805) Vital Signs (24h Range):  Temp:  [98 °F (36.7 °C)-98.5 °F (36.9 °C)] 98 °F (36.7 °C)  Pulse:  [] 100  Resp:  [18-27] 22  SpO2:  [93 %-99 %] 99 %  BP: (122-184)/() 155/57     Weight: 64.7 kg (142 lb 10.2 oz)  Body mass index is 28.81 kg/m².      Intake/Output Summary (Last 24 hours) at 3/20/2019 1018  Last data filed at 3/20/2019 0800  Gross per 24 hour   Intake 200 ml   Output 800 ml   Net -600 ml       Physical Exam   Constitutional: She is oriented to person, place, and time. She appears well-developed and well-nourished. No distress.   HENT:   Head: Normocephalic and atraumatic.   Eyes: EOM are normal.   Neck: Neck supple.   Cardiovascular: Normal rate and regular rhythm.   Pulmonary/Chest: Effort normal.   Abdominal: Soft. There is no tenderness.   Musculoskeletal: She exhibits no edema.   Neurological: She is alert and oriented to person, place, and time.   Psychiatric: She has a normal mood and affect.   Nursing note and vitals reviewed.      Vents:       Lines/Drains/Airways     Peripheral Intravenous Line                 Peripheral IV - Single Lumen 06/24/15 1040 Right Antecubital 1364 days         Peripheral IV - Single Lumen 10/09/18 0740 Left Antecubital 162 days         Peripheral IV - Single Lumen 03/19/19 1413 Right Hand less than 1 day                Significant Labs:    CBC/Anemia Profile:  Recent Labs   Lab 03/19/19  1415 03/20/19  0357   WBC 5.18 3.26*   HGB 8.8* 7.6*   HCT 26.5* 23.4*   * 102*   MCV 99* 100*   RDW 19.2* 19.4*        Chemistries:  Recent Labs   Lab 03/19/19  1415 03/20/19  0357    142   K 2.8* 5.1    111*   CO2 22* 21*   BUN 15 13   CREATININE 1.1 1.0   CALCIUM 7.9* 8.1*   ALBUMIN 3.5  --    PROT 6.4  --    BILITOT 1.3*  --    ALKPHOS 68  --    ALT 13  --    AST 32  --    MG 0.9* 1.3*   PHOS  --  2.5*         Significant  Imaging:  XRAY sacrum , pelvis no fx         ABG  No results for input(s): PH, PO2, PCO2, HCO3, BE in the last 168 hours.  Assessment/Plan:     Renal/   Hypokalemia    Replete magnesium.  Replete potassium.  Monitor potassium and magnesium levels.  Hold insulin.  3/20 K 5.1 , monitor level .      Hypomagnesemia    Magnesium level less than 1 mg /dl, replete 4-8 g magnesium in 12-24 hrs. Repeat Mg.   3/20 replete Mag. Monitor level .      Hematology   Thrombocytopenia    Likely chemo induced. Will give mechanical prophylaxis      Other   * Hypoglycemia secondary to diabetic medications    Fingerstick q.1 hour.  D50 IV as needed.  Keep off oral anti diabetics.  3/20 octreotide 50 mcg sc q 6 hrs . FS q 1 hr.           Critical Care Daily Checklist:    A: Awake: RASS Goal/Actual Goal:    Actual: Lyle Agitation Sedation Scale (RASS): Alert and calm   B: Spontaneous Breathing Trial Performed?     C: SAT & SBT Coordinated?  Y                      D: Delirium: CAM-ICU     E: Early Mobility Performed? Yes   F: Feeding Goal:    Status:     Current Diet Order   Procedures    Diet Adult Regular (IDDSI Level 7)      AS: Analgesia/Sedation No sedation    T: Thromboembolic Prophylaxis Y   H: HOB > 300 Yes   U: Stress Ulcer Prophylaxis (if needed) PPI   G: Glucose Control FS Q 1 hr    B: Bowel Function Stool Occurrence: 1   I: Indwelling Catheter (Lines & Alcantara) Necessity No alcantara    D: De-escalation of Antimicrobials/Pharmacotherapies No abx     Plan for the day/ETD Y    Code Status:  Family/Goals of Care: Full Code  Y     Critical Care Time: 35 minutes   Critical secondary to Patient has a condition that poses threat to life and bodily function: hypoglycemia/ severe electrolytes distirbances     Critical care was time spent personally by me on the following activities: development of treatment plan with patient or surrogate and bedside caregivers, discussions with consultants, evaluation of patient's response to treatment,  examination of patient, ordering and performing treatments and interventions, ordering and review of laboratory studies, ordering and review of radiographic studies, pulse oximetry, re-evaluation of patient's condition. This critical care time did not overlap with that of any other provider or involve time for any procedures.     Anderson Orellana MD  Critical Care Medicine  [facility] -

## 2019-03-20 NOTE — CONSULTS
"  [facility] -   Adult Nutrition  Consult Note    SUMMARY     Recommendations    Recommendation: 1. Consider changing diet to diabetic cardiac. 2. Add boost glucose control TID.  3. Will continue to monitor.   Intervention: Provided diabetic diet education w/ handouts from the Nutrition Care Manual. Pt verbalized understanding. RD contact info provided.   Goals: Meet > 85  % EEN/EPN while admitted  Nutrition Goal Status: new  Communication of RD Recs: (POC, sticky note)    Reason for Assessment    Reason For Assessment: consult   Dx:  1. Hypoglycemia secondary to sulfonylurea, accidental or unintentional, initial encounter    2. Pelvic pain    3. Prolonged QT interval    4. Hypokalemia    5. Hypocalcemia    6. Hypomagnesemia      Hx: Arthritis, CKD, DM type 2, fatty liver, HTN, HLD, Stage II infiltrating ductal carcinoma of breast    General info comments: Pt on regular diet. Pt reports decreased appetite today, PO intake 50 % this am. Pt reports UBW = 150 lbs, current weight =142 lbs (wt loss of 8 lbs within the last 6 months). Per pt, PO intake 75 % x > 6 months PTA. Per NFPE 3.20.19, mild subcutaneous fat and muscle loss. Pt agreed to try oral supplements. Pt was educated on diabetic diet.   Discharge plan: cardiac diabetic diet + ONS    Nutrition Risk Screen    Nutrition Risk Screen: no indicators present    Nutrition/Diet History    Spiritual, Cultural Beliefs, Voodoo Practices, Values that Affect Care: no    Anthropometrics    Temp: 98.3 °F (36.8 °C)  Height Method: Stated  Height: 4' 11" (149.9 cm)  Height (inches): 59 in  Weight Method: Bed Scale  Weight: 64.7 kg (142 lb 10.2 oz)  Weight (lb): 142.64 lb  Ideal Body Weight (IBW), Female: 95 lb  % Ideal Body Weight, Female (lb): 150.15 lb  BMI (Calculated): 28.9  BMI Grade: 25 - 29.9 - overweight  Usual Body Weight (UBW), k.18 kg  % Usual Body Weight: 95.09  % Weight Change From Usual Weight: -5.11 %       Lab/Procedures/Meds    Pertinent " Labs Reviewed: reviewed  BMP  Lab Results   Component Value Date     03/20/2019    K 5.1 03/20/2019     (H) 03/20/2019    CO2 21 (L) 03/20/2019    BUN 13 03/20/2019    CREATININE 1.0 03/20/2019    CALCIUM 8.1 (L) 03/20/2019    ANIONGAP 10 03/20/2019    ESTGFRAFRICA >60 03/20/2019    EGFRNONAA 58 (A) 03/20/2019     Lab Results   Component Value Date    CALCIUM 8.1 (L) 03/20/2019    PHOS 2.5 (L) 03/20/2019     Lab Results   Component Value Date    ALBUMIN 3.5 03/19/2019     Recent Labs   Lab 03/20/19  1105   POCTGLUCOSE 201*     Lab Results   Component Value Date    HGBA1C 6.8 (H) 08/08/2018       Pertinent Medications Reviewed: reviewed    Physical Findings/Assessment      skin: timur score 19     Estimated/Assessed Needs    Weight Used For Calorie Calculations: 64.7 kg (142 lb 10.2 oz)  Energy Calorie Requirements (kcal): 1941  Energy Need Method: Kcal/kg  Protein Requirements: 77 - 97 g  Weight Used For Protein Calculations: 64.7 kg (142 lb 10.2 oz)     Estimated Fluid Requirement Method: RDA Method(or pe rMD)  RDA Method (mL): 1941  CHO Requirement: 50 % EEN      Nutrition Prescription Ordered    Nutrition Order Comments: regular diet    Evaluation of Received Nutrient/Fluid Intake          Intake/Output Summary (Last 24 hours) at 3/20/2019 1140  Last data filed at 3/20/2019 1000  Gross per 24 hour   Intake 800 ml   Output 1100 ml   Net -300 ml       % Intake of Estimated Energy Needs: 50 - 75 %  % Meal Intake: 50 - 75 %    Nutrition Risk      1xweekly    Assessment and Plan    Mild malnutrition    Malnutrition in the context of Chronic Illness/Injury    Related to (etiology):  Physiological causes increasing nutrient needs d/t illness     Signs and Symptoms (as evidenced by):  Energy Intake:75 % of estimated energy requirement for > 6 months   Body Fat Depletion: mild depletion of orbitals   Muscle Mass Depletion: mild depletion of temples, clavicle region and scapular region   Weight Loss: 5 %  within the last 6 months     Interventions/Recommendations (treatment strategy):  See above     Nutrition Diagnosis Status:  New          Monitor and Evaluation    Food and Nutrient Intake: energy intake, food and beverage intake  Food and Nutrient Adminstration: diet order  Anthropometric Measurements: weight  Biochemical Data, Medical Tests and Procedures: electrolyte and renal panel, glucose/endocrine profile  Nutrition-Focused Physical Findings: overall appearance     Malnutrition Assessment                 Orbital Region (Subcutaneous Fat Loss): mild depletion   [employer] Region (Muscle Loss): mild depletion  Clavicle Bone Region (Muscle Loss): mild depletion  Clavicle and Acromion Bone Region (Muscle Loss): mild depletion  Scapular Bone Region (Muscle Loss): mild depletion                 Nutrition Follow-Up    RD Follow-up?: Yes

## 2019-03-20 NOTE — PROGRESS NOTES
[facility] -   Hematology/Oncology  Progress Note    Patient Name: Luda Jc  Admission Date: 3/19/2019  Hospital Length of Stay: 0 days  Code Status: Full Code     Subjective:     HPI:  Mrs. Jc is a 69 yo woman with diabetes and history of right-sided breast CA status post definitive surgery with adjuvant chemo therapy and radiation 2014.  November/ 2018 left breast lumpectomy for breast CA currently on Xeloda adjuvant therapy.  Presents with falling due to hypoglycemia.        Interval History: serum glucose improving. Clinically stable.    Oncology Treatment Plan:       Medications:  Continuous Infusions:  Scheduled Meds:   amLODIPine  10 mg Oral Daily    atorvastatin  80 mg Oral Daily    lisinopril  40 mg Oral BID    magnesium oxide  400 mg Oral BID    octreotide  50 mcg Subcutaneous Q6H    pantoprazole  40 mg Oral Daily     PRN Meds:acetaminophen, dextrose 50%, dextrose 50%, glucagon (human recombinant), glucose, glucose, hydrALAZINE, HYDROcodone-acetaminophen, ondansetron     Review of Systems   Constitutional: Negative.    HENT: Negative.    Eyes: Negative.    Respiratory: Negative.    Cardiovascular: Negative.    Gastrointestinal: Negative.    Endocrine: Negative.    Genitourinary: Negative.    Musculoskeletal: Negative.    Skin: Negative.    Allergic/Immunologic: Negative.    Neurological: Negative.    Hematological: Negative.    Psychiatric/Behavioral: Negative.      Objective:     Vital Signs (Most Recent):  Temp: 98.3 °F (36.8 °C) (03/20/19 0705)  Pulse: 94 (03/20/19 1105)  Resp: (!) 21 (03/20/19 1105)  BP: (!) 153/71 (03/20/19 1105)  SpO2: 98 % (03/20/19 1105) Vital Signs (24h Range):  Temp:  [98 °F (36.7 °C)-98.5 °F (36.9 °C)] 98.3 °F (36.8 °C)  Pulse:  [] 94  Resp:  [18-27] 21  SpO2:  [92 %-99 %] 98 %  BP: (122-184)/() 153/71     Weight: 64.7 kg (142 lb 10.2 oz)  Body mass index is 28.81 kg/m².  Body surface area is 1.64 meters squared.      Intake/Output Summary  (Last 24 hours) at 3/20/2019 1201  Last data filed at 3/20/2019 1000  Gross per 24 hour   Intake 800 ml   Output 1100 ml   Net -300 ml       Physical Exam   Constitutional: She is oriented to person, place, and time. She appears well-developed and well-nourished.   HENT:   Head: Normocephalic and atraumatic.   Eyes: Conjunctivae and EOM are normal.   Neck: Normal range of motion. Neck supple.   Cardiovascular: Normal rate and regular rhythm.   Pulmonary/Chest: Effort normal and breath sounds normal.   Abdominal: Soft. Bowel sounds are normal.   Musculoskeletal: Normal range of motion.   Neurological: She is alert and oriented to person, place, and time.   Skin: Skin is warm and dry.   Psychiatric: She has a normal mood and affect. Her behavior is normal. Judgment and thought content normal.   Nursing note and vitals reviewed.      Significant Labs:   All pertinent labs from the last 24 hours have been reviewed.    Diagnostic Results:  I have reviewed all pertinent imaging results/findings within the past 24 hours.    Assessment/Plan:     Stage II infiltrating ductal carcinoma of breast    Would hold tamoxifen and xeloda while in ICU.  Can resume after discharge.         Thank you for your consult. I will follow-up with patient. Please contact us if you have any additional questions.     Chava Sloan MD  Hematology/Oncology  [facility] -

## 2019-03-20 NOTE — PLAN OF CARE
Assessment completed.  Met with the patient/ family. CM explained and left info in blue transition of care folder regarding Advance Directives, Living Will ( FULL CODE) ,Pamphlet on D/C planning on admission and Pharmacy bedside delivery.  The role of CM explained for ICU transitions of care/ discharge planning. Per EMR,   DM II, breast cancer and CKD III who presented to the ED after a fall in the ICU while visiting her . She reports getting dizzy, having double vision, loosing her balance, hitting her head on two things and landing on her buttocks. She does not believe she lost consciousness and remembers the event. The patient reports decreased appetite since starting a new oral medication for breast cancer approximately 1-2 months ago.  Today, she reports only eating a small amount of lasagna around 11 am. She reports taking 1000 mg of metformin, 2 mg of glimepiride, 34 units of Lantus and 10 units of Novolog prior to eating. Initially, the patient's blood glucose was 29. Despite treatment with D50, subsequent measurements were 32, 96, 75 and most recently at 1657, 50. Labs in the ED were significant for a potassium of 2.8, calcium of 7.9 and magnesium of 0.9. Code status was discussed with the patient. She is a full code. Her  is in ICU due to CABG and her two sons are her secondary surrogate decision makers.   Patient has family support . Patient has [employer] insurance.  Patient has no needs at this time. CM to f/u for safe transition    [facility] [address] [place], [place] - [address] [address]  [address]  [place] [address]  Phone: [phone] Fax: [phone]    [facility] - [facility] [address] [facility]  [address] [facility] [address]  Phone: [phone] Fax: [phone]         03/20/19 1102   Discharge Assessment   Assessment Type Discharge Planning Assessment   Confirmed/corrected address and phone number on facesheet? No    Assessment information obtained from? Patient   Expected Length of Stay (days) (TBD)   Communicated expected length of stay with patient/caregiver no   Prior to hospitilization cognitive status: Alert/Oriented   Prior to hospitalization functional status: Assistive Equipment   Current cognitive status: Alert/Oriented   Current Functional Status: Assistive Equipment;Needs Assistance   Lives With alone   Able to Return to Prior Arrangements no   Is patient able to care for self after discharge? Yes   Patient's perception of discharge disposition home or selfcare   Readmission Within the Last 30 Days no previous admission in last 30 days   Patient currently being followed by outpatient case management? No   Patient currently receives any other outside agency services? No   Equipment Currently Used at Home walker, rolling;glucometer   Do you have any problems affording any of your prescribed medications? TBD   Is the patient taking medications as prescribed? yes   Does the patient have transportation home? Yes   Transportation Anticipated car, drives self;family or friend will provide   Does the patient receive services at the [facility]mad[facility] Clinic? No   Discharge Plan A Home with family   Discharge Plan B Home with family   DME Needed Upon Discharge  none   Patient/Family in Agreement with Plan yes

## 2019-03-20 NOTE — ASSESSMENT & PLAN NOTE
Fingerstick q.1 hour.  D50 IV as needed.  Keep off oral anti diabetics.  3/20 octreotide 50 mcg sc q 6 hrs . FS q 1 hr.

## 2019-03-20 NOTE — ASSESSMENT & PLAN NOTE
Malnutrition in the context of Chronic Illness/Injury    Related to (etiology):  Physiological causes increasing nutrient needs d/t illness     Signs and Symptoms (as evidenced by):  Energy Intake:75 % of estimated energy requirement for > 6 months   Body Fat Depletion: mild depletion of orbitals   Muscle Mass Depletion: mild depletion of temples, clavicle region and scapular region   Weight Loss: 5 % within the last 6 months     Interventions/Recommendations (treatment strategy):  See above     Nutrition Diagnosis Status:  New

## 2019-03-20 NOTE — HPI
Mrs. Jc is a 67 yo woman with diabetes and history of right-sided breast CA status post definitive surgery with adjuvant chemo therapy and radiation 2014.  November/ 2018 left breast lumpectomy for breast CA currently on Xeloda adjuvant therapy.  Presents with falling due to hypoglycemia.

## 2019-03-20 NOTE — PLAN OF CARE
CM spoke wih Dr. Dominguez and Maliha LUONG for D/C planning due patient very anxious to d/c home. She has her son at home to assist with her care.  Agreed patient is doing better but need MD f/u in the next 2 days and CM suggested HH. CM discussed d/c planning with patient . Patient agreed with D/C with HH. List given to the patient of [facility] with her insurance. Preference form signed for [facility], copy given to the patient and the original to the blue folder. . Cm placed referral in [facility] and notified liaison of the referral.

## 2019-03-20 NOTE — PLAN OF CARE
03/20/19 1101   Medicare Message   Important Message from Medicare regarding Discharge Appeal Rights Given to patient/caregiver;Explained to patient/caregiver;Signed/date by patient/caregiver   Date IMM was signed 03/20/19   Time IMM was signed 1100

## 2019-03-20 NOTE — SUBJECTIVE & OBJECTIVE
Interval History: serum glucose improving. Clinically stable.    Oncology Treatment Plan:       Medications:  Continuous Infusions:  Scheduled Meds:   amLODIPine  10 mg Oral Daily    atorvastatin  80 mg Oral Daily    lisinopril  40 mg Oral BID    magnesium oxide  400 mg Oral BID    octreotide  50 mcg Subcutaneous Q6H    pantoprazole  40 mg Oral Daily     PRN Meds:acetaminophen, dextrose 50%, dextrose 50%, glucagon (human recombinant), glucose, glucose, hydrALAZINE, HYDROcodone-acetaminophen, ondansetron     Review of Systems   Constitutional: Negative.    HENT: Negative.    Eyes: Negative.    Respiratory: Negative.    Cardiovascular: Negative.    Gastrointestinal: Negative.    Endocrine: Negative.    Genitourinary: Negative.    Musculoskeletal: Negative.    Skin: Negative.    Allergic/Immunologic: Negative.    Neurological: Negative.    Hematological: Negative.    Psychiatric/Behavioral: Negative.      Objective:     Vital Signs (Most Recent):  Temp: 98.3 °F (36.8 °C) (03/20/19 0705)  Pulse: 94 (03/20/19 1105)  Resp: (!) 21 (03/20/19 1105)  BP: (!) 153/71 (03/20/19 1105)  SpO2: 98 % (03/20/19 1105) Vital Signs (24h Range):  Temp:  [98 °F (36.7 °C)-98.5 °F (36.9 °C)] 98.3 °F (36.8 °C)  Pulse:  [] 94  Resp:  [18-27] 21  SpO2:  [92 %-99 %] 98 %  BP: (122-184)/() 153/71     Weight: 64.7 kg (142 lb 10.2 oz)  Body mass index is 28.81 kg/m².  Body surface area is 1.64 meters squared.      Intake/Output Summary (Last 24 hours) at 3/20/2019 1201  Last data filed at 3/20/2019 1000  Gross per 24 hour   Intake 800 ml   Output 1100 ml   Net -300 ml       Physical Exam   Constitutional: She is oriented to person, place, and time. She appears well-developed and well-nourished.   HENT:   Head: Normocephalic and atraumatic.   Eyes: Conjunctivae and EOM are normal.   Neck: Normal range of motion. Neck supple.   Cardiovascular: Normal rate and regular rhythm.   Pulmonary/Chest: Effort normal and breath sounds normal.    Abdominal: Soft. Bowel sounds are normal.   Musculoskeletal: Normal range of motion.   Neurological: She is alert and oriented to person, place, and time.   Skin: Skin is warm and dry.   Psychiatric: She has a normal mood and affect. Her behavior is normal. Judgment and thought content normal.   Nursing note and vitals reviewed.      Significant Labs:   All pertinent labs from the last 24 hours have been reviewed.    Diagnostic Results:  I have reviewed all pertinent imaging results/findings within the past 24 hours.

## 2019-03-20 NOTE — ASSESSMENT & PLAN NOTE
Magnesium level less than 1 mg /dl, replete 4-8 g magnesium in 12-24 hrs. Repeat Mg.   3/20 replete Mag. Monitor level .

## 2019-03-20 NOTE — H&P
[facility] Hospital Medicine  History & Physical    Patient Name: Luda Jc  MRN: [id number]  Admission Date: 3/19/2019  Attending Physician: Yared Dominguez MD   Primary Care Provider: Leanne Bonilla DO         Patient information was obtained from patient, past medical records and ER records.     Subjective:     Principal Problem:<principal problem not specified>    Chief Complaint:   Chief Complaint   Patient presents with    Fall     pt fell 2x upstairs when visiting  in ICU/ reports hitting head 2x on way down. feels weak        HPI: Luda Jc is a 68 year old female with DM II, breast cancer and CKD III who presented to the ED after a fall in the ICU while visiting her . She reports getting dizzy, having double vision, loosing her balance, hitting her head on two things and landing on her buttocks. She does not believe she lost consciousness and remembers the event. The patient reports decreased appetite since starting a new oral medication for breast cancer approximately 1-2 months ago. She denies fever and cough. Today, she reports only eating a small amount of lasagna around 11 am. She reports taking 1000 mg of metformin, 2 mg of glimepiride, 34 units of Lantus and 10 units of Novolog prior to eating. Initially, the patient's blood glucose was 29. Despite treatment with D50, subsequent measurements were 32, 96, 75 and most recently at 1657, 50. Labs in the ED were significant for a potassium of 2.8, calcium of 7.9 and magnesium of 0.9. Code status was discussed with the patient. She is a full code. Her , Remigio Jc, is her surrogate medical decision maker. If he is unable to make decisions due to his current illness, her two sons are her secondary surrogate decision makers.     Past Medical History:   Diagnosis Date    Abnormal LFTs (liver function tests) 10/11/2014    Arthritis     Chronic kidney disease     Diabetes mellitus, type 2     Encounter for  blood transfusion     Fatty liver     CT Abdomen/pelvis 6/24/2015---Fatty infiltration of the liver.    Hyperlipidemia     Hypertension     Insulin long-term use 10/11/2014    Nephrolithiasis     CT Abdomen/pelvis 6/24/2015---right nephrolithiasis.    Obesity     Stage II infiltrating ductal carcinoma of breast 6/16/2014    Followed by Dr. Virgen        Past Surgical History:   Procedure Laterality Date    APPENDECTOMY      BREAST LUMPECTOMY Right 2014    COLONOSCOPY N/A 10/9/2018    Performed by [facility]    ESOPHAGOGASTRODUODENOSCOPY (EGD) N/A 10/9/2018    Performed by [facility] at [facility]    TONSILLECTOMY, ADENOIDECTOMY         Review of patient's allergies indicates:  No Known Allergies    No current facility-administered medications on file prior to encounter.      Current Outpatient Medications on File Prior to Encounter   Medication Sig    amLODIPine (NORVASC) 10 MG tablet TAKE 1 TABLET BY MOUTH ONCE DAILY    atorvastatin (LIPITOR) 80 MG tablet TAKE 1 TABLET BY MOUTH ONCE DAILY    capecitabine (XELODA) 500 MG Tab Take 500 mg by mouth 2 (two) times daily.    fenofibrate micronized (LOFIBRA) 200 MG Cap TAKE 1 CAPSULE BY MOUTH ONCE DAILY WITH BREAKFAST    glimepiride (AMARYL) 2 MG tablet TAKE ONE TABLET BY MOUTH ONCE DAILY WITH  BREAKFAST    insulin (LANTUS SOLOSTAR U-100 INSULIN) glargine 100 units/mL (3mL) SubQ pen INJECT 34 UNITS SUBCUTANEOUSLY IN THE MORNING AND 30 IN THE EVENING    insulin aspart U-100 (NOVOLOG FLEXPEN U-100 INSULIN) 100 unit/mL InPn pen INJECT 10 UNITS SUBCUTANEOUSLY THREE TIMES DAILY WITH MEALS    lisinopril (PRINIVIL,ZESTRIL) 40 MG tablet TAKE 1 TABLET BY MOUTH TWICE DAILY    metFORMIN (GLUCOPHAGE) 1000 MG tablet TAKE 1 TABLET BY MOUTH TWICE DAILY WITH MEALS    pantoprazole (PROTONIX) 40 MG tablet Take 1 tablet (40 mg total) by mouth once daily.    ACCU-CHEK NICOLLE PLUS TEST STRP Strp USE ONE STRIP TO CHECK GLUCOSE 2 TO 3 TIMES  "DAILY AS DIRECTED    ACCU-CHEK SOFT DEV LANCETS Kit USE AS DIRECTED    blood-glucose meter kit Use as instructed    chlorthalidone (HYGROTEN) 50 MG Tab TAKE ONE TABLET BY MOUTH ONCE DAILY    cyclobenzaprine (FLEXERIL) 5 MG tablet Take 1 tablet (5 mg total) by mouth nightly.    diphenoxylate-atropine 2.5-0.025 mg (LOMOTIL) 2.5-0.025 mg per tablet Take 2.5 tablets by mouth.    lancets (ACCU-CHEK SOFTCLIX LANCETS) Misc USE AS DIRECTED TWICE DAILY    metoprolol succinate (TOPROL-XL) 25 MG 24 hr tablet Take 1 tablet (25 mg total) by mouth once daily.    omega-3 fatty acids-vitamin E (FISH OIL) 1,000 mg Cap Take two capsules daily.    pen needle, diabetic (BD ULTRA-FINE FATEMEH PEN NEEDLES) 32 gauge x 5/32" Ndle Use once daily. 250.00 IDDM    tamoxifen (NOLVADEX) 20 MG Tab     triamcinolone acetonide 0.1% (KENALOG) 0.1 % cream Apply topically 3 (three) times daily.     Family History     Problem Relation (Age of Onset)    Cancer Mother, Father    Diabetes Paternal Aunt    Heart disease Father    Stroke Son        Tobacco Use    Smoking status: Never Smoker    Smokeless tobacco: Never Used   Substance and Sexual Activity    Alcohol use: Yes     Alcohol/week: 0.0 oz     Comment: " occassionally"    Drug use: No    Sexual activity: No     Partners: Male     Birth control/protection: Post-menopausal     Review of Systems   Constitutional: Positive for appetite change (decrease). Negative for chills, diaphoresis, fatigue and fever.   HENT: Negative for congestion, ear pain, mouth sores, sore throat and trouble swallowing.    Eyes: Positive for visual disturbance. Negative for pain.   Respiratory: Negative for cough, chest tightness and shortness of breath.    Cardiovascular: Negative for chest pain, palpitations and leg swelling.   Gastrointestinal: Negative for abdominal pain, constipation and nausea.   Endocrine: Negative for cold intolerance, heat intolerance, polydipsia and polyuria.   Genitourinary: Negative " for dysuria, frequency and hematuria.   Musculoskeletal: Negative for arthralgias, back pain, myalgias and neck pain.   Skin: Negative for pallor, rash and wound.   Allergic/Immunologic: Negative for environmental allergies and immunocompromised state.   Neurological: Positive for dizziness. Negative for seizures, syncope, weakness, numbness and headaches.   Hematological: Negative for adenopathy. Does not bruise/bleed easily.   Psychiatric/Behavioral: Negative for agitation, confusion and sleep disturbance.     Objective:     Vital Signs (Most Recent):  Temp: 98.5 °F (36.9 °C) (03/19/19 1630)  Pulse: 109 (03/19/19 1630)  Resp: (!) 23 (03/19/19 1630)  BP: (!) 178/73 (03/19/19 1630)  SpO2: 95 % (03/19/19 1630) Vital Signs (24h Range):  Temp:  [98 °F (36.7 °C)-98.5 °F (36.9 °C)] 98.5 °F (36.9 °C)  Pulse:  [102-109] 109  Resp:  [18-23] 23  SpO2:  [93 %-99 %] 95 %  BP: (142-178)/(66-74) 178/73     Weight: 64.7 kg (142 lb 10.2 oz)  Body mass index is 28.81 kg/m².    Physical Exam   Constitutional: She is oriented to person, place, and time. She appears well-developed and well-nourished. No distress.   HENT:   Head: Normocephalic and atraumatic.   Eyes: Conjunctivae are normal.   PERRL   Neck: Neck supple. No JVD present.   Cardiovascular: Regular rhythm and normal heart sounds. Tachycardia present.   Pulmonary/Chest: Effort normal and breath sounds normal.   Abdominal: Soft. Bowel sounds are normal. She exhibits no distension. There is no tenderness.   Musculoskeletal: Normal range of motion. She exhibits edema (trace bilateral lower extremities).   Neurological: She is alert and oriented to person, place, and time.   Skin: Skin is warm and dry.   Psychiatric: She has a normal mood and affect. Her behavior is normal. Thought content normal.   Nursing note and vitals reviewed.          Significant Labs:   CBC:   Recent Labs   Lab 03/19/19  1415   WBC 5.18   HGB 8.8*   HCT 26.5*   *     CMP:   Recent Labs   Lab  03/19/19  1415      K 2.8*      CO2 22*   GLU 32*   BUN 15   CREATININE 1.1   CALCIUM 7.9*   PROT 6.4   ALBUMIN 3.5   BILITOT 1.3*   ALKPHOS 68   AST 32   ALT 13   ANIONGAP 16   EGFRNONAA 51*     All pertinent labs within the past 24 hours have been reviewed.    Significant Imaging: I have reviewed all pertinent imaging results/findings within the past 24 hours.   Imaging Results          X-Ray Pelvis Routine AP (Final result)  Result time 03/19/19 14:55:57    Final result by Sae Mills MD (03/19/19 14:55:57)                 Impression:      Degenerative changes.      Electronically signed by: Sae Mills MD  Date:    03/19/2019  Time:    14:55             Narrative:    EXAMINATION:  XR PELVIS ROUTINE AP    CLINICAL HISTORY:  - Pelvic and perineal pain.    COMPARISON:  None    FINDINGS:  Mild bilateral sacroiliac DJD.  No acute fracture or dislocation.  No enthesopathic changes at the bilateral iliac wings.  Alignment is satisfactory.                               X-Ray Sacrum And Coccyx (Final result)  Result time 03/19/19 14:56:21    Final result by Sae Mills MD (03/19/19 14:56:21)                 Impression:      No acute findings.      Electronically signed by: Sae Mills MD  Date:    03/19/2019  Time:    14:56             Narrative:    EXAMINATION:  XR SACRUM AND COCCYX    CLINICAL HISTORY:  - Pelvic and perineal pain.    COMPARISON:  None    FINDINGS:  Mild bilateral sacroiliac DJD.  No acute fracture or dislocation.                                  Assessment/Plan:     Hypoglycemia secondary to diabetic medications    -Hold metformin, glimepiride, Lantus and Novolog.   -D5 drip.   -Frequent accuchecks.   -Resume diet.   -Discussed the importance of adequate intake, especially after taking medications. Further discussed the possible need for medication adjustments upon discharge.        Thrombocytopenia    -Likely due to Xeloda.   -Monitor.        Hypokalemia    Monitor and replace as needed.         Hypomagnesemia    Monitor and replace as needed.        CKD (chronic kidney disease), stage III    -Most likely contributing to patient's inability to clear diabetic medications causing hypoglycemia.   -Creatinine at baseline.   -Monitor.        Hyperlipidemia, mixed    Continue statin.        Stage II infiltrating ductal carcinoma of breast    Continue capectilabine.        Hypertension goal BP (blood pressure) < 130/80    -Continue lisinopril and amlodipine.   -Hydralazine as needed.        Obesity (BMI 30.0-34.9)    Nutrition consult.          VTE Risk Mitigation (From admission, onward)        Ordered     Reason for No Pharmacological VTE Prophylaxis  Once      03/19/19 1742     Place sequential compression device  Until discontinued      03/19/19 1640        Critical care time spent on the evaluation and treatment of severe organ dysfunction, review of pertinent labs and imaging studies, discussions with consulting providers and discussions with patient/family: Greater than 90 minutes.     DEA Epperson  Department of Hospital Medicine   [facility]

## 2019-03-20 NOTE — SUBJECTIVE & OBJECTIVE
Interval History:   Review of Systems   Constitutional: Positive for malaise/fatigue. Negative for chills and fever.   HENT: Negative for hearing loss and nosebleeds.    Eyes: Negative for discharge.   Respiratory: Negative for cough and shortness of breath.    Cardiovascular: Negative for chest pain and leg swelling.   Gastrointestinal: Negative for abdominal pain.   Genitourinary: Negative for hematuria.   Musculoskeletal: Negative for falls.        Fall    Skin: Negative for itching.   Neurological: Positive for weakness. Negative for speech change, seizures and loss of consciousness.   Endo/Heme/Allergies: Negative for polydipsia. Does not bruise/bleed easily.        DM2    Psychiatric/Behavioral: Negative for hallucinations.       Objective:     Vital Signs (Most Recent):  Temp: 98 °F (36.7 °C) (03/20/19 0305)  Pulse: 100 (03/20/19 0805)  Resp: (!) 22 (03/20/19 0805)  BP: (!) 155/57 (03/20/19 0805)  SpO2: 99 % (03/20/19 0805) Vital Signs (24h Range):  Temp:  [98 °F (36.7 °C)-98.5 °F (36.9 °C)] 98 °F (36.7 °C)  Pulse:  [] 100  Resp:  [18-27] 22  SpO2:  [93 %-99 %] 99 %  BP: (122-184)/() 155/57     Weight: 64.7 kg (142 lb 10.2 oz)  Body mass index is 28.81 kg/m².      Intake/Output Summary (Last 24 hours) at 3/20/2019 1018  Last data filed at 3/20/2019 0800  Gross per 24 hour   Intake 200 ml   Output 800 ml   Net -600 ml       Physical Exam   Constitutional: She is oriented to person, place, and time. She appears well-developed and well-nourished. No distress.   HENT:   Head: Normocephalic and atraumatic.   Eyes: EOM are normal.   Neck: Neck supple.   Cardiovascular: Normal rate and regular rhythm.   Pulmonary/Chest: Effort normal.   Abdominal: Soft. There is no tenderness.   Musculoskeletal: She exhibits no edema.   Neurological: She is alert and oriented to person, place, and time.   Psychiatric: She has a normal mood and affect.   Nursing note and vitals reviewed.      Vents:        Lines/Drains/Airways     Peripheral Intravenous Line                 Peripheral IV - Single Lumen 06/24/15 1040 Right Antecubital 1364 days         Peripheral IV - Single Lumen 10/09/18 0740 Left Antecubital 162 days         Peripheral IV - Single Lumen 03/19/19 1413 Right Hand less than 1 day                Significant Labs:    CBC/Anemia Profile:  Recent Labs   Lab 03/19/19  1415 03/20/19  0357   WBC 5.18 3.26*   HGB 8.8* 7.6*   HCT 26.5* 23.4*   * 102*   MCV 99* 100*   RDW 19.2* 19.4*        Chemistries:  Recent Labs   Lab 03/19/19  1415 03/20/19  0357    142   K 2.8* 5.1    111*   CO2 22* 21*   BUN 15 13   CREATININE 1.1 1.0   CALCIUM 7.9* 8.1*   ALBUMIN 3.5  --    PROT 6.4  --    BILITOT 1.3*  --    ALKPHOS 68  --    ALT 13  --    AST 32  --    MG 0.9* 1.3*   PHOS  --  2.5*         Significant Imaging:  XRAY sacrum , pelvis no fx

## 2019-03-20 NOTE — NURSING
0715 Pt in bed A&Ox4, c/o mild pain to back. Pt ambulate to BSC with assist x1, unsteady gait noted. Pt c/o nausea, med admin earlier. Pt able to matt breakfast with no issues noted. Will continue to monitor. Marie Valencia RN  [id number] Call and set up follow-up appointment for pt on Friday with DEA Soriano, pt PCP, Dr Bonilla, had no available appts until 3/27/19. Marie Valencia RN  2777 Pt to d/c home today. PIV to R hand d/c with tip intact, pressure held, and dressing applied. AVS discharge instructions to include F/U appt, med changes, diet, and activity discuss and explain to pt, all questions answered and pt verbalize understanding. Prescription med called into pt pharmacy. Pt transport to vehicle via wheelchair, pt son with pt personal belongings. Marie Valencia RN

## 2019-03-21 ENCOUNTER — PATIENT OUTREACH (OUTPATIENT)
Dept: ADMINISTRATIVE | Facility: HOSPITAL | Age: 69
End: 2019-03-21

## 2019-03-21 LAB
ESTIMATED AVG GLUCOSE: 103 MG/DL
HBA1C MFR BLD HPLC: 5.2 %

## 2019-03-21 NOTE — DISCHARGE SUMMARY
[facility] Hospital Medicine  Discharge Summary      Patient Name: Luda Jc  MRN: [id number]  Admission Date: 3/19/2019  Hospital Length of Stay: 0 days  Discharge Date and Time: 3/20/2019  4:30 PM  Attending Physician:  Yared Dominguez MD  Discharging Provider: Yared Dominguez MD  Primary Care Provider: Leanne Bonilla DO      HPI:   Luda Jc is a 68 year old female with DM II, breast cancer and CKD III who presented to the ED after a fall in the ICU while visiting her . She reports getting dizzy, having double vision, loosing her balance, hitting her head on two things and landing on her buttocks. She does not believe she lost consciousness and remembers the event. The patient reports decreased appetite since starting a new oral medication for breast cancer approximately 1-2 months ago. She denies fever and cough. Today, she reports only eating a small amount of lasagna around 11 am. She reports taking 1000 mg of metformin, 2 mg of glimepiride, 34 units of Lantus and 10 units of Novolog prior to eating. Initially, the patient's blood glucose was 29. Despite treatment with D50, subsequent measurements were 32, 96, 75 and most recently at 1657, 50. Labs in the ED were significant for a potassium of 2.8, calcium of 7.9 and magnesium of 0.9. Code status was discussed with the patient. She is a full code. Her , Remigio Jc, is her surrogate medical decision maker. If he is unable to make decisions due to his current illness, her two sons are her secondary surrogate decision makers.     * No surgery found *      Hospital Course:   Admitted for evaluation and treatment of syncope.  Upon initial valuation revealed multiple electrolyte abnormalities.  Low blood sugar, low potassium, and low magnesium.  Treated with IV replacement.  Hourly blood sugar checks and repeat blood chemistries.  Symptomatic improvement after IV fluid resuscitation, electrolyte replacement, and persistent  normal glycemia.  Discharge plan to return home with home health.  Stop Clement bride and reduce daily Metformin dose by half.  Follow up with primary care provider soon.     Consults:   Consults (From admission, onward)        Status Ordering Provider     Inpatient consult to Registered Dietitian/Nutritionist  Once     Provider:  (Not yet assigned)    Completed JAMARI RANDHAWA consult to case management  Once     Provider:  (Not yet assigned)    Completed JOSE MIGUEL MONSALVE          No new Assessment & Plan notes have been filed under this hospital service since the last note was generated.  Service: Hospital Medicine    Final Active Diagnoses:    Diagnosis Date Noted POA    PRINCIPAL PROBLEM:  Hypoglycemia secondary to diabetic medications [T38.3X1A, E16.0] 03/19/2019 Yes    Mild malnutrition [E44.1] 03/20/2019 Unknown    Hypomagnesemia [E83.42] 03/19/2019 Yes    Hypokalemia [E87.6] 03/19/2019 Yes    Thrombocytopenia [D69.6] 03/19/2019 Yes    CKD (chronic kidney disease), stage III [N18.3] 03/19/2019 Yes    Obesity (BMI 30.0-34.9) [E66.9] 02/20/2018 Yes    Hyperlipidemia, mixed [E78.2] 04/20/2016 Yes     Chronic    Stage II infiltrating ductal carcinoma of breast [C50.919] 06/16/2014 Yes     Chronic    Hypertension goal BP (blood pressure) < 130/80 [I10]  Yes     Chronic      Problems Resolved During this Admission:       Discharged Condition: good    [place] [facility] [place]:  Follow-up Information     Leanne Bonilla DO In 2 days.    Specialty:  Internal Medicine  Why:  Hospital follow up for hypoglycemia and electrolyte abnormalities.  Contact information:  [address] [place] [address] [address] [place] [address]  [phone]             Adis Mcnair Iii, PA-C On 3/22/2019.    Specialty:  General Practice  Why:  HOSPITAL FOLLOW-UP FOR HYPOGLYCEMIA & ELECTROLYTE ABNORMALITIES  Contact information:  [address] [place] [address] [address] [place] [address]  [phone]             [facility]    Contact information:  [address] [facility] [address] [phone]                 Patient Instructions:      Diet diabetic     Activity as tolerated       Significant Diagnostic Studies: Labs: All labs within the past 24 hours have been reviewed    Pending Diagnostic Studies:     None         Medications:  Reconciled Home Medications:      Medication List      CHANGE how you take these medications    insulin aspart U-100 100 unit/mL Inpn pen  Commonly known as:  NovoLOG Flexpen U-100 Insulin  INJECT 10 UNITS SUBCUTANEOUSLY THREE TIMES DAILY WITH MEALS  What changed:    · how much to take  · how to take this  · when to take this  · additional instructions     metFORMIN 1000 MG tablet  Commonly known as:  GLUCOPHAGE  Take 0.5 tablets (500 mg total) by mouth 2 (two) times daily with meals.  What changed:  how much to take        CONTINUE taking these medications    ACCU-CHEK NICOLLE PLUS TEST STRP Strp  Generic drug:  blood sugar diagnostic  USE ONE STRIP TO CHECK GLUCOSE 2 TO 3 TIMES DAILY AS DIRECTED     ACCU-CHEK SOFT DEV LANCETS Kit  Generic drug:  lancing device with lancets  USE AS DIRECTED     amLODIPine 10 MG tablet  Commonly known as:  NORVASC  TAKE 1 TABLET BY MOUTH ONCE DAILY     atorvastatin 80 MG tablet  Commonly known as:  LIPITOR  TAKE 1 TABLET BY MOUTH ONCE DAILY     blood-glucose meter kit  Use as instructed     capecitabine 500 MG Tab  Commonly known as:  XELODA  Take 500 mg by mouth 2 (two) times daily.     fenofibrate micronized 200 MG Cap  Commonly known as:  LOFIBRA  TAKE 1 CAPSULE BY MOUTH ONCE DAILY WITH BREAKFAST     insulin glargine 100 units/mL (3mL) SubQ pen  Commonly known as:  LANTUS SOLOSTAR U-100 INSULIN  INJECT 34 UNITS SUBCUTANEOUSLY IN THE MORNING AND 30 IN THE EVENING     lancets Misc  Commonly known as:  ACCU-CHEK SOFTCLIX LANCETS  USE AS DIRECTED TWICE DAILY     lisinopril 40 MG tablet  Commonly known as:   "PRINIVIL,ZESTRIL  TAKE 1 TABLET BY MOUTH TWICE DAILY     pantoprazole 40 MG tablet  Commonly known as:  PROTONIX  Take 1 tablet (40 mg total) by mouth once daily.     pen needle, diabetic 32 gauge x 5/32" Ndle  Commonly known as:  BD ULTRA-FINE FATEMEH PEN NEEDLE  Use once daily. 250.00 IDDM        STOP taking these medications    glimepiride 2 MG tablet  Commonly known as:  AMARYL            Indwelling Lines/Drains at time of discharge:   Lines/Drains/Airways          None          Time spent on the discharge of patient: 30 minutes  Patient was seen and examined on the date of discharge and determined to be suitable for discharge.    Critical care time spent on the evaluation and treatment of severe organ dysfunction, review of pertinent labs and imaging studies, discussions with consulting providers and discussions with patient/family: 30 minutes.     Yared Dominguez MD  Department of Hospital Medicine  [facility]  "

## 2019-03-21 NOTE — HOSPITAL COURSE
Admitted for evaluation and treatment of syncope.  Upon initial valuation revealed multiple electrolyte abnormalities.  Low blood sugar, low potassium, and low magnesium.  Treated with IV replacement.  Hourly blood sugar checks and repeat blood chemistries.  Symptomatic improvement after IV fluid resuscitation, electrolyte replacement, and persistent normal glycemia.  Discharge plan to return home with home health.  Stop Clement bride and reduce daily Metformin dose by half.  Follow up with primary care provider soon.

## 2019-03-22 ENCOUNTER — TELEPHONE (OUTPATIENT)
Dept: INTERNAL MEDICINE | Facility: CLINIC | Age: 69
End: 2019-03-22

## 2019-03-22 ENCOUNTER — OFFICE VISIT (OUTPATIENT)
Dept: INTERNAL MEDICINE | Facility: CLINIC | Age: 69
End: 2019-03-22
Payer: MEDICARE

## 2019-03-22 VITALS
SYSTOLIC BLOOD PRESSURE: 138 MMHG | WEIGHT: 142 LBS | HEIGHT: 59 IN | DIASTOLIC BLOOD PRESSURE: 70 MMHG | OXYGEN SATURATION: 95 % | TEMPERATURE: 100 F | BODY MASS INDEX: 28.63 KG/M2 | HEART RATE: 71 BPM

## 2019-03-22 DIAGNOSIS — N18.30 CKD (CHRONIC KIDNEY DISEASE), STAGE III: ICD-10-CM

## 2019-03-22 DIAGNOSIS — D69.6 THROMBOCYTOPENIA: ICD-10-CM

## 2019-03-22 DIAGNOSIS — E16.0 HYPOGLYCEMIA SECONDARY TO SULFONYLUREA, ACCIDENTAL OR UNINTENTIONAL, SUBSEQUENT ENCOUNTER: Primary | ICD-10-CM

## 2019-03-22 DIAGNOSIS — T38.3X1D HYPOGLYCEMIA SECONDARY TO SULFONYLUREA, ACCIDENTAL OR UNINTENTIONAL, SUBSEQUENT ENCOUNTER: Primary | ICD-10-CM

## 2019-03-22 DIAGNOSIS — D64.9 ANEMIA, UNSPECIFIED TYPE: ICD-10-CM

## 2019-03-22 DIAGNOSIS — R50.9 FEVER, UNSPECIFIED FEVER CAUSE: ICD-10-CM

## 2019-03-22 DIAGNOSIS — E44.1 MILD MALNUTRITION: ICD-10-CM

## 2019-03-22 LAB
BACTERIA #/AREA URNS HPF: NORMAL /HPF
BILIRUB UR QL STRIP: NEGATIVE
CLARITY UR: CLEAR
COLOR UR: YELLOW
GLUCOSE UR QL STRIP: ABNORMAL
HGB UR QL STRIP: NEGATIVE
HYALINE CASTS #/AREA URNS LPF: NORMAL /LPF
KETONES UR QL STRIP: NEGATIVE
LEUKOCYTE ESTERASE UR QL STRIP: NEGATIVE
MICROSCOPIC COMMENT: NORMAL
NITRITE UR QL STRIP: NEGATIVE
PH UR STRIP: 8 [PH] (ref 5–8)
PROT UR QL STRIP: ABNORMAL
RBC #/AREA URNS HPF: 1 /HPF (ref 0–4)
SP GR UR STRIP: 1.01 (ref 1–1.03)
SQUAMOUS #/AREA URNS HPF: 12 /HPF
URN SPEC COLLECT METH UR: ABNORMAL
WBC #/AREA URNS HPF: 2 /HPF (ref 0–5)

## 2019-03-22 PROCEDURE — 1101F PR PT FALLS ASSESS DOC 0-1 FALLS W/OUT INJ PAST YR: ICD-10-PCS | Mod: HCNC,CPTII,S$GLB, | Performed by: PHYSICIAN ASSISTANT

## 2019-03-22 PROCEDURE — 3075F PR MOST RECENT SYSTOLIC BLOOD PRESS GE 130-139MM HG: ICD-10-PCS | Mod: HCNC,CPTII,S$GLB, | Performed by: PHYSICIAN ASSISTANT

## 2019-03-22 PROCEDURE — 99499 UNLISTED E&M SERVICE: CPT | Mod: HCNC,S$GLB,, | Performed by: PHYSICIAN ASSISTANT

## 2019-03-22 PROCEDURE — 99999 PR PBB SHADOW E&M-EST. PATIENT-LVL V: ICD-10-PCS | Mod: PBBFAC,HCNC,, | Performed by: PHYSICIAN ASSISTANT

## 2019-03-22 PROCEDURE — 99214 PR OFFICE/OUTPT VISIT, EST, LEVL IV, 30-39 MIN: ICD-10-PCS | Mod: HCNC,S$GLB,, | Performed by: PHYSICIAN ASSISTANT

## 2019-03-22 PROCEDURE — 3078F PR MOST RECENT DIASTOLIC BLOOD PRESSURE < 80 MM HG: ICD-10-PCS | Mod: HCNC,CPTII,S$GLB, | Performed by: PHYSICIAN ASSISTANT

## 2019-03-22 PROCEDURE — 3075F SYST BP GE 130 - 139MM HG: CPT | Mod: HCNC,CPTII,S$GLB, | Performed by: PHYSICIAN ASSISTANT

## 2019-03-22 PROCEDURE — 99999 PR PBB SHADOW E&M-EST. PATIENT-LVL V: CPT | Mod: PBBFAC,HCNC,, | Performed by: PHYSICIAN ASSISTANT

## 2019-03-22 PROCEDURE — 1101F PT FALLS ASSESS-DOCD LE1/YR: CPT | Mod: HCNC,CPTII,S$GLB, | Performed by: PHYSICIAN ASSISTANT

## 2019-03-22 PROCEDURE — 87086 URINE CULTURE/COLONY COUNT: CPT | Mod: HCNC

## 2019-03-22 PROCEDURE — 99214 OFFICE O/P EST MOD 30 MIN: CPT | Mod: HCNC,S$GLB,, | Performed by: PHYSICIAN ASSISTANT

## 2019-03-22 PROCEDURE — 81000 URINALYSIS NONAUTO W/SCOPE: CPT | Mod: HCNC

## 2019-03-22 PROCEDURE — 3078F DIAST BP <80 MM HG: CPT | Mod: HCNC,CPTII,S$GLB, | Performed by: PHYSICIAN ASSISTANT

## 2019-03-22 PROCEDURE — 99499 RISK ADDL DX/OHS AUDIT: ICD-10-PCS | Mod: HCNC,S$GLB,, | Performed by: PHYSICIAN ASSISTANT

## 2019-03-22 NOTE — TELEPHONE ENCOUNTER
----- Message from Adis Mcnair III, PA-C sent at 3/22/2019  3:23 PM CDT -----  ok  ----- Message -----  From: Abbie Merida MA  Sent: 3/22/2019   3:11 PM  To: Adis Mcnair III, PA-C    Called [facility] and spoke with Josefina. She will add cbc, cmp, magnesium and phosphorus to the lab draw on Monday for the patient.

## 2019-03-22 NOTE — PROGRESS NOTES
Transitional Care Note  Subjective:       Patient ID: Luda Jc is a 68 y.o. female.  Chief Complaint: Follow-up (ER  PCP Juan C Bonilla)    Family and/or Caretaker present at visit?  Yes.  Diagnostic tests reviewed/disposition: I have reviewed all completed as well as pending diagnostic tests at the time of discharge.  Disease/illness education: done  Home health/community services discussion/referrals: Patient has home health established at [facility].   Establishment or re-establishment of referral orders for community resources: No other necessary community resources.   Discussion with other health care providers: No discussion with other health care providers necessary.     HPI:   Luda Jc is a 68 year old female with DM II, breast cancer and CKD III who presented to the ED after a fall in the ICU while visiting her . She reports getting dizzy, having double vision, loosing her balance, hitting her head on two things and landing on her buttocks. She does not believe she lost consciousness and remembers the event. The patient reports decreased appetite since starting a new oral medication for breast cancer approximately 1-2 months ago. She denies fever and cough. Today, she reports only eating a small amount of lasagna around 11 am. She reports taking 1000 mg of metformin, 2 mg of glimepiride, 34 units of Lantus and 10 units of Novolog prior to eating. Initially, the patient's blood glucose was 29. Despite treatment with D50, subsequent measurements were 32, 96, 75 and most recently at 1657, 50. Labs in the ED were significant for a potassium of 2.8, calcium of 7.9 and magnesium of 0.9. Code status was discussed with the patient. She is a full code. Her , Remigio Jc, is her surrogate medical decision maker. If he is unable to make decisions due to his current illness, her two sons are her secondary surrogate decision makers.      * No surgery found *       Hospital Course:   Admitted for evaluation  and treatment of syncope.  Upon initial valuation revealed multiple electrolyte abnormalities.  Low blood sugar, low potassium, and low magnesium.  Treated with IV replacement.  Hourly blood sugar checks and repeat blood chemistries.  Symptomatic improvement after IV fluid resuscitation, electrolyte replacement, and persistent normal glycemia.  Discharge plan to return home with home health.  Stop Clement bride and reduce daily Metformin dose by half.  Follow up with primary care provider soon.           Past Medical History:   Diagnosis Date    Abnormal LFTs (liver function tests) 10/11/2014    Arthritis     Chronic kidney disease     Diabetes mellitus, type 2     Encounter for blood transfusion     Fatty liver     CT Abdomen/pelvis 6/24/2015---Fatty infiltration of the liver.    Hyperlipidemia     Hypertension     Insulin long-term use 10/11/2014    Nephrolithiasis     CT Abdomen/pelvis 6/24/2015---right nephrolithiasis.    Obesity     Stage II infiltrating ductal carcinoma of breast 6/16/2014    Followed by Dr. Virgen        Past Surgical History:   Procedure Laterality Date    APPENDECTOMY      BREAST LUMPECTOMY Right 2014    COLONOSCOPY N/A 10/9/2018    [facility]    ESOPHAGOGASTRODUODENOSCOPY (EGD) N/A 10/9/2018    Per[facility] at [facility]    TONSILLECTOMY, ADENOIDECTOMY         Family History   Problem Relation Age of Onset    Cancer Mother         Stomach    Cancer Father         stomach    Heart disease Father     Stroke Son     Diabetes Paternal Aunt     Kidney disease Neg Hx        Social History     Socioeconomic History    Marital status:      Spouse name: Not on file    Number of children: Not on file    Years of education: Not on file    Highest education level: Not on file   Social Needs    Financial resource strain: Not on file    Food insecurity - worry: Not on file    Food insecurity - inability: Not on  "file    Transportation needs - medical: Not on file    Transportation needs - non-medical: Not on file   Occupational History    Not on file   Tobacco Use    Smoking status: Never Smoker    Smokeless tobacco: Never Used   Substance and Sexual Activity    Alcohol use: Yes     Alcohol/week: 0.0 oz     Comment: " occassionally"    Drug use: No    Sexual activity: No     Partners: Male     Birth control/protection: Post-menopausal   Other Topics Concern    Not on file   Social History Narrative    . Lives with spouse. Has 2 children. Patient works full time for [employer].        Review of patient's allergies indicates:  No Known Allergies      Current Outpatient Medications:     amLODIPine (NORVASC) 10 MG tablet, TAKE 1 TABLET BY MOUTH ONCE DAILY, Disp: 90 tablet, Rfl: 1    atorvastatin (LIPITOR) 80 MG tablet, TAKE 1 TABLET BY MOUTH ONCE DAILY, Disp: 90 tablet, Rfl: 1    capecitabine (XELODA) 500 MG Tab, Take 500 mg by mouth 2 (two) times daily., Disp: , Rfl:     fenofibrate micronized (LOFIBRA) 200 MG Cap, TAKE 1 CAPSULE BY MOUTH ONCE DAILY WITH BREAKFAST, Disp: 90 capsule, Rfl: 1    insulin (LANTUS SOLOSTAR U-100 INSULIN) glargine 100 units/mL (3mL) SubQ pen, INJECT 34 UNITS SUBCUTANEOUSLY IN THE MORNING AND 30 IN THE EVENING, Disp: 2 Box, Rfl: 6    insulin aspart U-100 (NOVOLOG FLEXPEN U-100 INSULIN) 100 unit/mL InPn pen, INJECT 10 UNITS SUBCUTANEOUSLY THREE TIMES DAILY WITH MEALS (Patient taking differently: Inject 10 Units into the skin 2 (two) times daily. ), Disp: 1 Box, Rfl: 6    lisinopril (PRINIVIL,ZESTRIL) 40 MG tablet, TAKE 1 TABLET BY MOUTH TWICE DAILY, Disp: 180 tablet, Rfl: 2    metFORMIN (GLUCOPHAGE) 1000 MG tablet, Take 0.5 tablets (500 mg total) by mouth 2 (two) times daily with meals., Disp: 180 tablet, Rfl: 1    pantoprazole (PROTONIX) 40 MG tablet, Take 1 tablet (40 mg total) by mouth once daily., Disp: 30 tablet, Rfl: 11    ACCU-CHEK NICOLLE PLUS TEST STRP Strp, USE ONE STRIP " "TO CHECK GLUCOSE 2 TO 3 TIMES DAILY AS DIRECTED, Disp: 300 strip, Rfl: 3    ACCU-CHEK SOFT DEV LANCETS Kit, USE AS DIRECTED, Disp: 1 each, Rfl: 0    blood-glucose meter kit, Use as instructed, Disp: 1 each, Rfl: 0    lancets (ACCU-CHEK SOFTCLIX LANCETS) Misc, USE AS DIRECTED TWICE DAILY, Disp: 200 each, Rfl: 3    pen needle, diabetic (BD ULTRA-FINE FATEMEH PEN NEEDLES) 32 gauge x 5/32" Ndle, Use once daily. 250.00 IDDM, Disp: 100 each, Rfl: 3    /70 (BP Location: Right arm, Patient Position: Sitting, BP Method: Medium (Manual))   Pulse 71   Temp 100.1 °F (37.8 °C) (Tympanic)   Ht 4' 11" (1.499 m)   Wt 64.4 kg (142 lb)   SpO2 95%   BMI 28.68 kg/m²   Review of Systems   Constitutional: Positive for activity change, chills, fatigue and fever.   HENT: Negative.  Negative for congestion and postnasal drip.    Respiratory: Negative for chest tightness and shortness of breath.    Cardiovascular: Negative for chest pain and palpitations.   Gastrointestinal: Negative for abdominal pain.       Objective:      Physical Exam   Constitutional: Vital signs are normal. She has a sickly appearance. She appears ill. No distress.   HENT:   Head: Normocephalic and atraumatic.   Neck: Neck supple.   Cardiovascular: Normal rate and regular rhythm. Exam reveals no gallop and no friction rub.   No murmur heard.  Pulmonary/Chest: Effort normal and breath sounds normal. No stridor. No respiratory distress. She has no wheezes. She has no rales. She exhibits no tenderness.   Abdominal: Soft. She exhibits no distension and no mass. There is no tenderness. There is no rebound and no guarding. No hernia.   Lymphadenopathy:     She has no cervical adenopathy.   Neurological: She is alert.   Skin: She is not diaphoretic.   Nursing note and vitals reviewed.      Lab Results   Component Value Date    WBC 3.26 (L) 03/20/2019    HGB 7.6 (L) 03/20/2019    HCT 23.4 (L) 03/20/2019     (H) 03/20/2019     (L) 03/20/2019 "     CMP  Sodium   Date Value Ref Range Status   03/20/2019 142 136 - 145 mmol/L Final     Potassium   Date Value Ref Range Status   03/20/2019 5.1 3.5 - 5.1 mmol/L Final     Chloride   Date Value Ref Range Status   03/20/2019 111 (H) 95 - 110 mmol/L Final     CO2   Date Value Ref Range Status   03/20/2019 21 (L) 23 - 29 mmol/L Final     Glucose   Date Value Ref Range Status   03/20/2019 82 70 - 110 mg/dL Final     BUN, Bld   Date Value Ref Range Status   03/20/2019 13 8 - 23 mg/dL Final     Creatinine   Date Value Ref Range Status   03/20/2019 1.0 0.5 - 1.4 mg/dL Final     Calcium   Date Value Ref Range Status   03/20/2019 8.1 (L) 8.7 - 10.5 mg/dL Final     Total Protein   Date Value Ref Range Status   03/19/2019 6.4 6.0 - 8.4 g/dL Final     Albumin   Date Value Ref Range Status   03/19/2019 3.5 3.5 - 5.2 g/dL Final     Total Bilirubin   Date Value Ref Range Status   03/19/2019 1.3 (H) 0.1 - 1.0 mg/dL Final     Comment:     For infants and newborns, interpretation of results should be based  on gestational age, weight and in agreement with clinical  observations.  Premature Infant recommended reference ranges:  Up to 24 hours.............<8.0 mg/dL  Up to 48 hours............<12.0 mg/dL  3-5 days..................<15.0 mg/dL  6-29 days.................<15.0 mg/dL       Alkaline Phosphatase   Date Value Ref Range Status   03/19/2019 68 55 - 135 U/L Final     AST   Date Value Ref Range Status   03/19/2019 32 10 - 40 U/L Final     ALT   Date Value Ref Range Status   03/19/2019 13 10 - 44 U/L Final     Anion Gap   Date Value Ref Range Status   03/20/2019 10 8 - 16 mmol/L Final     eGFR if    Date Value Ref Range Status   03/20/2019 >60 >60 mL/min/1.73 m^2 Final     eGFR if non    Date Value Ref Range Status   03/20/2019 58 (A) >60 mL/min/1.73 m^2 Final     Comment:     Calculation used to obtain the estimated glomerular filtration  rate (eGFR) is the CKD-EPI equation.        Lab Results    Component Value Date    CHOL 141 08/08/2018     Lab Results   Component Value Date    HDL 24 (L) 08/08/2018     Lab Results   Component Value Date    LDLCALC 39.0 (L) 08/08/2018     Lab Results   Component Value Date    TRIG 390 (H) 08/08/2018     Lab Results   Component Value Date    CHOLHDL 17.0 (L) 08/08/2018     Lab Results   Component Value Date    HGBA1C 5.2 03/20/2019   I reviewed x-rays that were done I the hospital.     Assessment:       1. Hypoglycemia secondary to sulfonylurea, accidental or unintentional, subsequent encounter    2. CKD (chronic kidney disease), stage III    3. Thrombocytopenia    4. Anemia, unspecified type    5. Fever, unspecified fever cause    6. Mild malnutrition        Plan:       Hypoglycemia secondary to sulfonylurea, accidental or unintentional, subsequent encounter  Instructed to continue to monitor glucose and continue with the medication changes.     Suggest to keep appointments with Oncology    CKD (chronic kidney disease), stage III  -     Comprehensive metabolic panel; Future; Expected date: 03/22/2019    Thrombocytopenia    Anemia, unspecified type  -     CBC auto differential; Future; Expected date: 03/22/2019    Fever, unspecified fever cause  -     Urinalysis  -     Urine culture    Mild malnutrition  -     Comprehensive metabolic panel; Future; Expected date: 03/22/2019  -     Magnesium; Future; Expected date: 03/22/2019  -     PHOSPHORUS; Future; Expected date: 03/22/2019

## 2019-03-23 LAB — BACTERIA UR CULT: NORMAL

## 2019-03-24 NOTE — PLAN OF CARE
03/24/19 1537   Final Note   Assessment Type Final Discharge Note   Anticipated Discharge Disposition Home-Health  ([facility])   Discharge plans and expectations educations in teach back method with documentation complete? Yes   Right Care Referral Info   Post Acute Recommendation   ([facility])

## 2019-03-26 ENCOUNTER — TELEPHONE (OUTPATIENT)
Dept: ADMINISTRATIVE | Facility: CLINIC | Age: 69
End: 2019-03-26

## 2019-03-26 ENCOUNTER — TELEPHONE (OUTPATIENT)
Dept: INTERNAL MEDICINE | Facility: CLINIC | Age: 69
End: 2019-03-26

## 2019-03-26 ENCOUNTER — LAB VISIT (OUTPATIENT)
Dept: LAB | Facility: HOSPITAL | Age: 69
End: 2019-03-26
Attending: INTERNAL MEDICINE
Payer: MEDICARE

## 2019-03-26 DIAGNOSIS — N18.6 TYPE 2 DIABETES MELLITUS WITH END-STAGE RENAL DISEASE: Primary | ICD-10-CM

## 2019-03-26 DIAGNOSIS — E83.42 HYPOMAGNESEMIA: ICD-10-CM

## 2019-03-26 DIAGNOSIS — D69.6 THROMBOCYTOPENIA, UNSPECIFIED: ICD-10-CM

## 2019-03-26 DIAGNOSIS — N18.30 CHRONIC KIDNEY DISEASE, STAGE III (MODERATE): ICD-10-CM

## 2019-03-26 DIAGNOSIS — E11.22 TYPE 2 DIABETES MELLITUS WITH END-STAGE RENAL DISEASE: Primary | ICD-10-CM

## 2019-03-26 DIAGNOSIS — E87.6 HYPOPOTASSEMIA: ICD-10-CM

## 2019-03-26 LAB
ALBUMIN SERPL BCP-MCNC: 4.2 G/DL (ref 3.5–5.2)
ALP SERPL-CCNC: 66 U/L (ref 55–135)
ALT SERPL W/O P-5'-P-CCNC: 11 U/L (ref 10–44)
ANION GAP SERPL CALC-SCNC: 12 MMOL/L (ref 8–16)
ANION GAP SERPL CALC-SCNC: 12 MMOL/L (ref 8–16)
AST SERPL-CCNC: 33 U/L (ref 10–40)
BILIRUB SERPL-MCNC: 1.6 MG/DL (ref 0.1–1)
BUN SERPL-MCNC: 15 MG/DL (ref 8–23)
BUN SERPL-MCNC: 15 MG/DL (ref 8–23)
CALCIUM SERPL-MCNC: 8.9 MG/DL (ref 8.7–10.5)
CALCIUM SERPL-MCNC: 8.9 MG/DL (ref 8.7–10.5)
CHLORIDE SERPL-SCNC: 102 MMOL/L (ref 95–110)
CHLORIDE SERPL-SCNC: 102 MMOL/L (ref 95–110)
CO2 SERPL-SCNC: 25 MMOL/L (ref 23–29)
CO2 SERPL-SCNC: 25 MMOL/L (ref 23–29)
CREAT SERPL-MCNC: 1 MG/DL (ref 0.5–1.4)
CREAT SERPL-MCNC: 1 MG/DL (ref 0.5–1.4)
ERYTHROCYTE [DISTWIDTH] IN BLOOD BY AUTOMATED COUNT: 18.4 % (ref 11.5–14.5)
EST. GFR  (AFRICAN AMERICAN): >60 ML/MIN/1.73 M^2
EST. GFR  (AFRICAN AMERICAN): >60 ML/MIN/1.73 M^2
EST. GFR  (NON AFRICAN AMERICAN): 58 ML/MIN/1.73 M^2
EST. GFR  (NON AFRICAN AMERICAN): 58 ML/MIN/1.73 M^2
GLUCOSE SERPL-MCNC: 192 MG/DL (ref 70–110)
GLUCOSE SERPL-MCNC: 192 MG/DL (ref 70–110)
HCT VFR BLD AUTO: 25.5 % (ref 37–48.5)
HGB BLD-MCNC: 8.5 G/DL (ref 12–16)
MAGNESIUM SERPL-MCNC: 0.9 MG/DL (ref 1.6–2.6)
MCH RBC QN AUTO: 34.1 PG (ref 27–31)
MCHC RBC AUTO-ENTMCNC: 33.3 G/DL (ref 32–36)
MCV RBC AUTO: 102 FL (ref 82–98)
PHOSPHATE SERPL-MCNC: 3.8 MG/DL (ref 2.7–4.5)
PLATELET # BLD AUTO: 160 K/UL (ref 150–350)
PLATELET BLD QL SMEAR: NORMAL
PMV BLD AUTO: 10.3 FL (ref 9.2–12.9)
POTASSIUM SERPL-SCNC: 4 MMOL/L (ref 3.5–5.1)
POTASSIUM SERPL-SCNC: 4 MMOL/L (ref 3.5–5.1)
PROT SERPL-MCNC: 6.7 G/DL (ref 6–8.4)
RBC # BLD AUTO: 2.49 M/UL (ref 4–5.4)
SODIUM SERPL-SCNC: 139 MMOL/L (ref 136–145)
SODIUM SERPL-SCNC: 139 MMOL/L (ref 136–145)
WBC # BLD AUTO: 3.34 K/UL (ref 3.9–12.7)

## 2019-03-26 PROCEDURE — 80053 COMPREHEN METABOLIC PANEL: CPT | Mod: HCNC

## 2019-03-26 PROCEDURE — 85027 COMPLETE CBC AUTOMATED: CPT | Mod: HCNC

## 2019-03-26 PROCEDURE — 83735 ASSAY OF MAGNESIUM: CPT | Mod: HCNC

## 2019-03-26 PROCEDURE — 84100 ASSAY OF PHOSPHORUS: CPT | Mod: HCNC

## 2019-03-26 RX ORDER — LANOLIN ALCOHOL/MO/W.PET/CERES
400 CREAM (GRAM) TOPICAL 3 TIMES DAILY
Qty: 45 TABLET | Refills: 0 | COMMUNITY
Start: 2019-03-26

## 2019-03-26 NOTE — PROGRESS NOTES
Subjective:       Patient ID: Luda Jc is a 68 y.o. female.    Chief Complaint: No chief complaint on file.    HPI  Review of Systems    Objective:      Physical Exam    Assessment:       No diagnosis found.    Plan:   There are no diagnoses linked to this encounter.      Critical lab  0.9  Asymptomatic - discussed w her on phone  Oral repletion   Discussed mg oxide 400 TID  Messaged PCP team    No follow-ups on file.

## 2019-03-28 ENCOUNTER — TELEPHONE (OUTPATIENT)
Dept: INTERNAL MEDICINE | Facility: CLINIC | Age: 69
End: 2019-03-28

## 2019-03-28 DIAGNOSIS — N28.9 RENAL INSUFFICIENCY: ICD-10-CM

## 2019-03-28 DIAGNOSIS — E83.42 HYPOMAGNESEMIA: Primary | ICD-10-CM

## 2019-04-04 ENCOUNTER — PATIENT OUTREACH (OUTPATIENT)
Dept: ADMINISTRATIVE | Facility: HOSPITAL | Age: 69
End: 2019-04-04

## 2019-04-04 ENCOUNTER — APPOINTMENT (OUTPATIENT)
Dept: LAB | Facility: HOSPITAL | Age: 69
End: 2019-04-04
Attending: INTERNAL MEDICINE
Payer: MEDICARE

## 2019-04-04 DIAGNOSIS — M89.9 BONE DISORDER: Primary | ICD-10-CM

## 2019-04-04 DIAGNOSIS — M85.80 OSTEOPENIA, UNSPECIFIED LOCATION: ICD-10-CM

## 2019-04-12 ENCOUNTER — LAB VISIT (OUTPATIENT)
Dept: LAB | Facility: HOSPITAL | Age: 69
End: 2019-04-12
Attending: INTERNAL MEDICINE
Payer: MEDICARE

## 2019-04-12 DIAGNOSIS — E83.42 HYPOMAGNESEMIA: ICD-10-CM

## 2019-04-12 DIAGNOSIS — N28.9 RENAL INSUFFICIENCY: ICD-10-CM

## 2019-04-12 LAB
ANION GAP SERPL CALC-SCNC: 9 MMOL/L (ref 8–16)
BUN SERPL-MCNC: 15 MG/DL (ref 8–23)
CALCIUM SERPL-MCNC: 10 MG/DL (ref 8.7–10.5)
CHLORIDE SERPL-SCNC: 102 MMOL/L (ref 95–110)
CO2 SERPL-SCNC: 29 MMOL/L (ref 23–29)
CREAT SERPL-MCNC: 1.2 MG/DL (ref 0.5–1.4)
EST. GFR  (AFRICAN AMERICAN): 53.3 ML/MIN/1.73 M^2
EST. GFR  (NON AFRICAN AMERICAN): 46.2 ML/MIN/1.73 M^2
GLUCOSE SERPL-MCNC: 87 MG/DL (ref 70–110)
MAGNESIUM SERPL-MCNC: 1.8 MG/DL (ref 1.6–2.6)
POTASSIUM SERPL-SCNC: 3.9 MMOL/L (ref 3.5–5.1)
SODIUM SERPL-SCNC: 140 MMOL/L (ref 136–145)

## 2019-04-12 PROCEDURE — 83735 ASSAY OF MAGNESIUM: CPT | Mod: HCNC

## 2019-04-12 PROCEDURE — 36415 COLL VENOUS BLD VENIPUNCTURE: CPT | Mod: HCNC

## 2019-04-12 PROCEDURE — 80048 BASIC METABOLIC PNL TOTAL CA: CPT | Mod: HCNC

## 2019-04-15 ENCOUNTER — TELEPHONE (OUTPATIENT)
Dept: INTERNAL MEDICINE | Facility: CLINIC | Age: 69
End: 2019-04-15

## 2019-04-15 NOTE — TELEPHONE ENCOUNTER
----- Message from Leanne Bonilla DO sent at 4/8/2019  6:06 PM CDT -----  BMP shows chronic kidney disease. Electrolytes are within normal range.   Magnesium was not done. Please schedule.

## 2019-04-16 RX ORDER — FENOFIBRATE 200 MG/1
CAPSULE ORAL
Qty: 90 CAPSULE | Refills: 1 | Status: SHIPPED | OUTPATIENT
Start: 2019-04-16 | End: 2020-02-03

## 2019-04-16 RX ORDER — CHLORTHALIDONE 50 MG/1
TABLET ORAL
Qty: 30 TABLET | Refills: 6 | Status: SHIPPED | OUTPATIENT
Start: 2019-04-16 | End: 2019-11-23 | Stop reason: SDUPTHER

## 2019-04-17 ENCOUNTER — TELEPHONE (OUTPATIENT)
Dept: INTERNAL MEDICINE | Facility: CLINIC | Age: 69
End: 2019-04-17

## 2019-04-17 NOTE — TELEPHONE ENCOUNTER
Returned call to Theil with Home Health. Needs an updated medication list. Requested information faxed to number provided, [phone].

## 2019-04-17 NOTE — TELEPHONE ENCOUNTER
----- Message from Chloe Olson sent at 4/17/2019 12:08 PM CDT -----  Contact: [facility] - Ms Canales  Stated she will like a call back about pt medication, stated she will like a call from Mr Adis Mcnair, she can be reached at [phone] Thanks

## 2019-04-18 ENCOUNTER — TELEPHONE (OUTPATIENT)
Dept: INTERNAL MEDICINE | Facility: CLINIC | Age: 69
End: 2019-04-18

## 2019-04-18 NOTE — TELEPHONE ENCOUNTER
----- Message from Uri Almeida MA sent at 4/18/2019  8:18 AM CDT -----  Called Patient regarding her HRA visit on today which needs to be rescheduled. Patient stated that she also wanted to cancel her visits that she has today with Doctor Chad as well due to bad weather    Uri Almeida  4/18/2019

## 2019-04-30 ENCOUNTER — LAB VISIT (OUTPATIENT)
Dept: LAB | Facility: HOSPITAL | Age: 69
End: 2019-04-30
Attending: DERMATOLOGY
Payer: MEDICARE

## 2019-04-30 DIAGNOSIS — L40.0 PSORIASIS VULGARIS: Primary | ICD-10-CM

## 2019-04-30 LAB
ALBUMIN SERPL BCP-MCNC: 4.2 G/DL (ref 3.5–5.2)
ALP SERPL-CCNC: 55 U/L (ref 55–135)
ALT SERPL W/O P-5'-P-CCNC: 10 U/L (ref 10–44)
ANION GAP SERPL CALC-SCNC: 10 MMOL/L (ref 8–16)
AST SERPL-CCNC: 23 U/L (ref 10–40)
BASOPHILS # BLD AUTO: 0.04 K/UL (ref 0–0.2)
BASOPHILS NFR BLD: 0.6 % (ref 0–1.9)
BILIRUB SERPL-MCNC: 0.8 MG/DL (ref 0.1–1)
BUN SERPL-MCNC: 23 MG/DL (ref 8–23)
CALCIUM SERPL-MCNC: 10 MG/DL (ref 8.7–10.5)
CHLORIDE SERPL-SCNC: 100 MMOL/L (ref 95–110)
CHOLEST SERPL-MCNC: 148 MG/DL (ref 120–199)
CHOLEST/HDLC SERPL: 5.3 {RATIO} (ref 2–5)
CO2 SERPL-SCNC: 25 MMOL/L (ref 23–29)
CREAT SERPL-MCNC: 1.3 MG/DL (ref 0.5–1.4)
DIFFERENTIAL METHOD: ABNORMAL
EOSINOPHIL # BLD AUTO: 0.2 K/UL (ref 0–0.5)
EOSINOPHIL NFR BLD: 2.7 % (ref 0–8)
ERYTHROCYTE [DISTWIDTH] IN BLOOD BY AUTOMATED COUNT: 14.9 % (ref 11.5–14.5)
EST. GFR  (AFRICAN AMERICAN): 48.4 ML/MIN/1.73 M^2
EST. GFR  (NON AFRICAN AMERICAN): 42 ML/MIN/1.73 M^2
GLUCOSE SERPL-MCNC: 146 MG/DL (ref 70–110)
HCT VFR BLD AUTO: 31.7 % (ref 37–48.5)
HDLC SERPL-MCNC: 28 MG/DL (ref 40–75)
HDLC SERPL: 18.9 % (ref 20–50)
HGB BLD-MCNC: 9.7 G/DL (ref 12–16)
IMM GRANULOCYTES # BLD AUTO: 0.02 K/UL (ref 0–0.04)
IMM GRANULOCYTES NFR BLD AUTO: 0.3 % (ref 0–0.5)
LDLC SERPL CALC-MCNC: 66 MG/DL (ref 63–159)
LYMPHOCYTES # BLD AUTO: 0.9 K/UL (ref 1–4.8)
LYMPHOCYTES NFR BLD: 13.5 % (ref 18–48)
MCH RBC QN AUTO: 30 PG (ref 27–31)
MCHC RBC AUTO-ENTMCNC: 30.6 G/DL (ref 32–36)
MCV RBC AUTO: 98 FL (ref 82–98)
MONOCYTES # BLD AUTO: 0.4 K/UL (ref 0.3–1)
MONOCYTES NFR BLD: 5.6 % (ref 4–15)
NEUTROPHILS # BLD AUTO: 5.1 K/UL (ref 1.8–7.7)
NEUTROPHILS NFR BLD: 77.3 % (ref 38–73)
NONHDLC SERPL-MCNC: 120 MG/DL
NRBC BLD-RTO: 0 /100 WBC
PLATELET # BLD AUTO: 137 K/UL (ref 150–350)
PMV BLD AUTO: 11.2 FL (ref 9.2–12.9)
POTASSIUM SERPL-SCNC: 4.2 MMOL/L (ref 3.5–5.1)
PROT SERPL-MCNC: 6.8 G/DL (ref 6–8.4)
RBC # BLD AUTO: 3.23 M/UL (ref 4–5.4)
SODIUM SERPL-SCNC: 135 MMOL/L (ref 136–145)
TRIGL SERPL-MCNC: 270 MG/DL (ref 30–150)
WBC # BLD AUTO: 6.61 K/UL (ref 3.9–12.7)

## 2019-04-30 PROCEDURE — 80061 LIPID PANEL: CPT | Mod: HCNC

## 2019-04-30 PROCEDURE — 36415 COLL VENOUS BLD VENIPUNCTURE: CPT | Mod: HCNC

## 2019-04-30 PROCEDURE — 85025 COMPLETE CBC W/AUTO DIFF WBC: CPT | Mod: HCNC

## 2019-04-30 PROCEDURE — 80053 COMPREHEN METABOLIC PANEL: CPT | Mod: HCNC

## 2019-05-13 RX ORDER — AMLODIPINE BESYLATE 10 MG/1
TABLET ORAL
Qty: 90 TABLET | Refills: 1 | Status: SHIPPED | OUTPATIENT
Start: 2019-05-13 | End: 2020-02-24

## 2019-05-17 RX ORDER — METFORMIN HYDROCHLORIDE 1000 MG/1
TABLET ORAL
Qty: 180 TABLET | Refills: 1 | Status: SHIPPED | OUTPATIENT
Start: 2019-05-17 | End: 2019-06-05 | Stop reason: SDUPTHER

## 2019-06-05 ENCOUNTER — HOSPITAL ENCOUNTER (OUTPATIENT)
Dept: RADIOLOGY | Facility: HOSPITAL | Age: 69
Discharge: HOME | End: 2019-06-05
Attending: PHYSICIAN ASSISTANT
Payer: MEDICARE

## 2019-06-05 ENCOUNTER — OFFICE VISIT (OUTPATIENT)
Dept: ORTHOPEDICS | Facility: CLINIC | Age: 69
End: 2019-06-05
Payer: MEDICARE

## 2019-06-05 VITALS
HEIGHT: 59 IN | BODY MASS INDEX: 26 KG/M2 | SYSTOLIC BLOOD PRESSURE: 147 MMHG | DIASTOLIC BLOOD PRESSURE: 73 MMHG | HEART RATE: 90 BPM | WEIGHT: 129 LBS

## 2019-06-05 DIAGNOSIS — M25.511 BILATERAL SHOULDER PAIN, UNSPECIFIED CHRONICITY: ICD-10-CM

## 2019-06-05 DIAGNOSIS — M25.512 CHRONIC PAIN OF BOTH SHOULDERS: ICD-10-CM

## 2019-06-05 DIAGNOSIS — M25.512 BILATERAL SHOULDER PAIN, UNSPECIFIED CHRONICITY: Primary | ICD-10-CM

## 2019-06-05 DIAGNOSIS — M25.511 BILATERAL SHOULDER PAIN, UNSPECIFIED CHRONICITY: Primary | ICD-10-CM

## 2019-06-05 DIAGNOSIS — M12.811 ROTATOR CUFF ARTHROPATHY OF BOTH SHOULDERS: Primary | ICD-10-CM

## 2019-06-05 DIAGNOSIS — G89.29 CHRONIC PAIN OF BOTH SHOULDERS: ICD-10-CM

## 2019-06-05 DIAGNOSIS — M25.512 BILATERAL SHOULDER PAIN, UNSPECIFIED CHRONICITY: ICD-10-CM

## 2019-06-05 DIAGNOSIS — M12.812 ROTATOR CUFF ARTHROPATHY OF BOTH SHOULDERS: Primary | ICD-10-CM

## 2019-06-05 DIAGNOSIS — M25.511 CHRONIC PAIN OF BOTH SHOULDERS: ICD-10-CM

## 2019-06-05 PROCEDURE — 73030 X-RAY EXAM OF SHOULDER: CPT | Mod: TC,50,HCNC

## 2019-06-05 PROCEDURE — 99999 PR PBB SHADOW E&M-EST. PATIENT-LVL IV: CPT | Mod: PBBFAC,HCNC,, | Performed by: PHYSICIAN ASSISTANT

## 2019-06-05 PROCEDURE — 99203 OFFICE O/P NEW LOW 30 MIN: CPT | Mod: 25,HCNC,S$GLB, | Performed by: PHYSICIAN ASSISTANT

## 2019-06-05 PROCEDURE — 3077F SYST BP >= 140 MM HG: CPT | Mod: HCNC,CPTII,S$GLB, | Performed by: PHYSICIAN ASSISTANT

## 2019-06-05 PROCEDURE — 3044F PR MOST RECENT HEMOGLOBIN A1C LEVEL <7.0%: ICD-10-PCS | Mod: HCNC,CPTII,S$GLB, | Performed by: PHYSICIAN ASSISTANT

## 2019-06-05 PROCEDURE — 20610 PR DRAIN/INJECT LARGE JOINT/BURSA: ICD-10-PCS | Mod: 50,HCNC,S$GLB, | Performed by: PHYSICIAN ASSISTANT

## 2019-06-05 PROCEDURE — 3077F PR MOST RECENT SYSTOLIC BLOOD PRESSURE >= 140 MM HG: ICD-10-PCS | Mod: HCNC,CPTII,S$GLB, | Performed by: PHYSICIAN ASSISTANT

## 2019-06-05 PROCEDURE — 1101F PR PT FALLS ASSESS DOC 0-1 FALLS W/OUT INJ PAST YR: ICD-10-PCS | Mod: HCNC,CPTII,S$GLB, | Performed by: PHYSICIAN ASSISTANT

## 2019-06-05 PROCEDURE — 99203 PR OFFICE/OUTPT VISIT, NEW, LEVL III, 30-44 MIN: ICD-10-PCS | Mod: 25,HCNC,S$GLB, | Performed by: PHYSICIAN ASSISTANT

## 2019-06-05 PROCEDURE — 3078F DIAST BP <80 MM HG: CPT | Mod: HCNC,CPTII,S$GLB, | Performed by: PHYSICIAN ASSISTANT

## 2019-06-05 PROCEDURE — 73030 X-RAY EXAM OF SHOULDER: CPT | Mod: 26,50,HCNC, | Performed by: RADIOLOGY

## 2019-06-05 PROCEDURE — 20610 DRAIN/INJ JOINT/BURSA W/O US: CPT | Mod: 50,HCNC,S$GLB, | Performed by: PHYSICIAN ASSISTANT

## 2019-06-05 PROCEDURE — 73030 XR SHOULDER COMPLETE 2 OR MORE VIEWS BILATERAL: ICD-10-PCS | Mod: 26,50,HCNC, | Performed by: RADIOLOGY

## 2019-06-05 PROCEDURE — 99999 PR PBB SHADOW E&M-EST. PATIENT-LVL IV: ICD-10-PCS | Mod: PBBFAC,HCNC,, | Performed by: PHYSICIAN ASSISTANT

## 2019-06-05 PROCEDURE — 3044F HG A1C LEVEL LT 7.0%: CPT | Mod: HCNC,CPTII,S$GLB, | Performed by: PHYSICIAN ASSISTANT

## 2019-06-05 PROCEDURE — 1101F PT FALLS ASSESS-DOCD LE1/YR: CPT | Mod: HCNC,CPTII,S$GLB, | Performed by: PHYSICIAN ASSISTANT

## 2019-06-05 PROCEDURE — 3078F PR MOST RECENT DIASTOLIC BLOOD PRESSURE < 80 MM HG: ICD-10-PCS | Mod: HCNC,CPTII,S$GLB, | Performed by: PHYSICIAN ASSISTANT

## 2019-06-05 RX ORDER — METOPROLOL SUCCINATE 25 MG/1
25 TABLET, EXTENDED RELEASE ORAL 2 TIMES DAILY
COMMUNITY
End: 2020-07-06

## 2019-06-05 RX ORDER — HYDROCODONE BITARTRATE AND ACETAMINOPHEN 10; 325 MG/1; MG/1
10-325 TABLET ORAL 2 TIMES DAILY PRN
COMMUNITY
End: 2020-07-06

## 2019-06-05 RX ORDER — METHYLPREDNISOLONE ACETATE 80 MG/ML
80 INJECTION, SUSPENSION INTRA-ARTICULAR; INTRALESIONAL; INTRAMUSCULAR; SOFT TISSUE ONCE
Status: COMPLETED | OUTPATIENT
Start: 2019-06-05 | End: 2019-06-05

## 2019-06-05 RX ORDER — DIPHENOXYLATE HYDROCHLORIDE AND ATROPINE SULFATE 2.5; .025 MG/1; MG/1
2 TABLET ORAL 4 TIMES DAILY PRN
COMMUNITY
Start: 2019-05-16 | End: 2020-07-06

## 2019-06-05 RX ORDER — OXYCODONE HYDROCHLORIDE 15 MG/1
15 TABLET ORAL EVERY 4 HOURS PRN
Status: ON HOLD | COMMUNITY
Start: 2019-06-17 | End: 2021-08-04 | Stop reason: HOSPADM

## 2019-06-05 RX ORDER — ONDANSETRON HYDROCHLORIDE 8 MG/1
8 TABLET, FILM COATED ORAL EVERY 8 HOURS PRN
COMMUNITY
Start: 2019-04-29

## 2019-06-05 RX ADMIN — METHYLPREDNISOLONE ACETATE 80 MG: 80 INJECTION, SUSPENSION INTRA-ARTICULAR; INTRALESIONAL; INTRAMUSCULAR; SOFT TISSUE at 03:06

## 2019-06-05 NOTE — PROGRESS NOTES
Subjective:      Patient ID: Luda Jc is a 68 y.o. female.    Chief Complaint: Pain of the Left Shoulder and Pain of the Right Shoulder      HPI: Luda Jc  is a 68 y.o. female who c/o Pain of the Left Shoulder and Pain of the Right Shoulder   for duration of years, but worse since she had a fall 3 months ago.  She was visiting her  in the hospital when she fell.  Left is worse than the right shoulder.  Pain level 8/10 in severity.  Quality is aching and throbbing.  It is intermittent.  Alleviating factors include pain medications from her pain management doctor as well as rest.  Aggravating factors include raising the arms over shoulder level. She has had shoulder problems prior to her fall but they just worsened.    Past Medical History:   Diagnosis Date    Abnormal LFTs (liver function tests) 10/11/2014    Arthritis     Chronic kidney disease     Diabetes mellitus, type 2     Encounter for blood transfusion     Fatty liver     CT Abdomen/pelvis 6/24/2015---Fatty infiltration of the liver.    Hyperlipidemia     Hypertension     Insulin long-term use 10/11/2014    Nephrolithiasis     CT Abdomen/pelvis 6/24/2015---right nephrolithiasis.    Obesity     Stage II infiltrating ductal carcinoma of breast 6/16/2014    Followed by Dr. Virgen      Past Surgical History:   Procedure Laterality Date    APPENDECTOMY      BREAST LUMPECTOMY Right 2014    COLONOSCOPY N/A 10/9/2018    [facility]    ESOPHAGOGASTRODUODENOSCOPY (EGD) N/A 10/9/2018    [facility]    TONSILLECTOMY, ADENOIDECTOMY       Family History   Problem Relation Age of Onset    Cancer Mother         Stomach    Cancer Father         stomach    Heart disease Father     Stroke Son     Diabetes Paternal Aunt     Kidney disease Neg Hx      Social History     Socioeconomic History    Marital status:      Spouse name: Not on file    Number of children: Not  "on file    Years of education: Not on file    Highest education level: Not on file   Occupational History    Not on file   Social Needs    Financial resource strain: Not on file    Food insecurity:     Worry: Not on file     Inability: Not on file    Transportation needs:     Medical: Not on file     Non-medical: Not on file   Tobacco Use    Smoking status: Never Smoker    Smokeless tobacco: Never Used   Substance and Sexual Activity    Alcohol use: Yes     Alcohol/week: 0.0 oz     Comment: " occassionally"    Drug use: No    Sexual activity: Never     Partners: Male     Birth control/protection: Post-menopausal   Lifestyle    Physical activity:     Days per week: Not on file     Minutes per session: Not on file    Stress: Not on file   Relationships    Social connections:     Talks on phone: Not on file     Gets together: Not on file     Attends Worship service: Not on file     Active member of club or organization: Not on file     Attends meetings of clubs or organizations: Not on file     Relationship status: Not on file   Other Topics Concern    Not on file   Social History Narrative    . Lives with spouse. Has 2 children. Patient works full time for [employer].      Medication List with Changes/Refills   Current Medications    ACCU-CHEK NICOLLE PLUS TEST STRP STRP    USE ONE STRIP TO CHECK GLUCOSE 2 TO 3 TIMES DAILY AS DIRECTED    ACCU-CHEK SOFT DEV LANCETS KIT    USE AS DIRECTED    AMLODIPINE (NORVASC) 10 MG TABLET    TAKE 1 TABLET BY MOUTH ONCE DAILY    ATORVASTATIN (LIPITOR) 80 MG TABLET    TAKE 1 TABLET BY MOUTH ONCE DAILY    BLOOD-GLUCOSE METER KIT    Use as instructed    CAPECITABINE (XELODA) 500 MG TAB    Take 500 mg by mouth 2 (two) times daily.    CHLORTHALIDONE (HYGROTEN) 50 MG TAB    TAKE 1 TABLET BY MOUTH ONCE DAILY    DIPHENOXYLATE-ATROPINE 2.5-0.025 MG (LOMOTIL) 2.5-0.025 MG PER TABLET    Take 2 tablets by mouth 4 (four) times daily as needed.    FENOFIBRATE MICRONIZED " "(LOFIBRA) 200 MG CAP    TAKE 1 CAPSULE BY MOUTH WITH BREAKFAST ONCE DAILY    HYDROCODONE-ACETAMINOPHEN (NORCO)  MG PER TABLET    Take  mg by mouth 2 (two) times daily as needed.    INSULIN (LANTUS SOLOSTAR U-100 INSULIN) GLARGINE 100 UNITS/ML (3ML) SUBQ PEN    INJECT 34 UNITS SUBCUTANEOUSLY IN THE MORNING AND 30 IN THE EVENING    INSULIN ASPART U-100 (NOVOLOG FLEXPEN U-100 INSULIN) 100 UNIT/ML INPN PEN    INJECT 10 UNITS SUBCUTANEOUSLY THREE TIMES DAILY WITH MEALS    LANCETS (ACCU-CHEK SOFTCLIX LANCETS) MISC    USE AS DIRECTED TWICE DAILY    LISINOPRIL (PRINIVIL,ZESTRIL) 40 MG TABLET    TAKE 1 TABLET BY MOUTH TWICE DAILY    MAGNESIUM OXIDE (MAG-OX) 400 MG (241.3 MG MAGNESIUM) TABLET    Take 1 tablet (400 mg total) by mouth 3 (three) times daily.    METFORMIN (GLUCOPHAGE) 1000 MG TABLET    Take 0.5 tablets (500 mg total) by mouth 2 (two) times daily with meals.    METOPROLOL SUCCINATE (TOPROL-XL) 25 MG 24 HR TABLET    Take 25 mg by mouth 2 (two) times daily.    ONDANSETRON (ZOFRAN) 8 MG TABLET    Take 8 mg by mouth every 8 (eight) hours as needed.    OXYCODONE (ROXICODONE) 15 MG TAB    Take 15 mg by mouth every 4 (four) hours as needed.    PANTOPRAZOLE (PROTONIX) 40 MG TABLET    Take 1 tablet (40 mg total) by mouth once daily.    PEN NEEDLE, DIABETIC (BD ULTRA-FINE FATEMEH PEN NEEDLES) 32 GAUGE X 5/32" NDLE    Use once daily. 250.00 IDDM   Discontinued Medications    METFORMIN (GLUCOPHAGE) 1000 MG TABLET    TAKE 1 TABLET BY MOUTH TWICE DAILY WITH MEALS     Review of patient's allergies indicates:  No Known Allergies    Review of Systems   Constitution: Negative for fever.   Cardiovascular: Negative for chest pain.   Respiratory: Negative for cough and shortness of breath.    Skin: Negative for rash.   Musculoskeletal: Positive for joint pain, muscle weakness and stiffness. Negative for joint swelling.   Gastrointestinal: Negative for heartburn.   Neurological: Negative for headaches and numbness.       "   Objective:        General    Nursing note and vitals reviewed.  Constitutional: She is oriented to person, place, and time. She appears well-developed and well-nourished.   HENT:   Head: Normocephalic and atraumatic.   Eyes: EOM are normal.   Cardiovascular: Normal rate and regular rhythm.    Pulmonary/Chest: Effort normal.   Abdominal: Soft.   Neurological: She is alert and oriented to person, place, and time.   Psychiatric: She has a normal mood and affect. Her behavior is normal.         Right Shoulder Exam     Inspection/Observation   Swelling: absent  Bruising: absent  Scars: absent  Deformity: absent  Scapular Dyskinesia: negative    Tenderness   The patient is tender to palpation of the greater tuberosity and acromioclavicular joint.    Range of Motion   Active abduction: abnormal   Passive abduction: abnormal   Extension: abnormal   Forward Flexion: abnormal   Forward Elevation: abnormal  Adduction: abnormal  External Rotation 0 degrees: abnormal   Internal rotation 0 degrees: abnormal     Tests & Signs   Cross arm: negative  Drop arm: negative  Carranza test: positive  Impingement: positive  Active Compression Test ([place]'s Sign): positive  Speed's Test: positive    Other   Sensation: normal    Left Shoulder Exam     Inspection/Observation   Swelling: absent  Bruising: absent  Scars: absent  Deformity: absent  Scapular Dyskinesia: negative    Tenderness   The patient is tender to palpation of the greater tuberosity and acromioclavicular joint.    Range of Motion   Active abduction: abnormal   Passive abduction: abnormal   Extension: abnormal   Forward Flexion: abnormal   Forward Elevation: abnormal  Adduction: abnormal  External Rotation 0 degrees: abnormal   Internal rotation 0 degrees: abnormal     Tests & Signs   Cross arm: negative  Drop arm: negative  Carranza test: positive  Impingement: positive  Active Compression Test ([place]'s Sign): positive  Speed's Test: positive    Other   Sensation: normal        Muscle Strength   Right Upper Extremity   Shoulder Abduction: 5/5   Shoulder Internal Rotation: 4/5   Shoulder External Rotation: 4/5   Left Upper Extremity  Shoulder Abduction: 3/5   Shoulder Internal Rotation: 3/5   Shoulder External Rotation: 3/5     Vascular Exam     Right Pulses      Radial:                    2+      Left Pulses      Radial:                    2+      Capillary Refill  Right Hand: normal capillary refill  Left Hand: normal capillary refill            Xray:   Bilateral shoulders from today images and report were reviewed today.  I agree with the radiologist's interpretation.  Bilateral degenerative changes at the AC and glenohumeral joints.  Bilateral superior subluxation of the humeral heads with narrowing of the subacromial spaces.  Calcific density noted projecting along the inferior margin of the glenoid labrum.  Minimal spurring along the inferior medial margin of the humeral head.  Remaining osseous structures appear intact.    Assessment:       Encounter Diagnoses   Name Primary?    Rotator cuff arthropathy of both shoulders Yes    Chronic pain of both shoulders     Uncontrolled type 2 diabetes mellitus with stage 3 chronic kidney disease, with long-term current use of insulin           Plan:       Luda was seen today for pain and pain.    Diagnoses and all orders for this visit:    Rotator cuff arthropathy of both shoulders  -     methylPREDNISolone acetate injection 80 mg  -     Ambulatory Referral to Physical/Occupational Therapy  -     methylPREDNISolone acetate injection 80 mg    Chronic pain of both shoulders  -     methylPREDNISolone acetate injection 80 mg  -     Ambulatory Referral to Physical/Occupational Therapy  -     methylPREDNISolone acetate injection 80 mg    Uncontrolled type 2 diabetes mellitus with stage 3 chronic kidney disease, with long-term current use of insulin      Ms. Jc is a new patient with a new problem.  She has rotator cuff arthropathy  bilaterally.  We have discussed optimizing conservative treatment.  After discussing risks and benefits of a steroid injection, she wishes to proceed with bilateral shoulder injections today.  I will also get her started with some physical therapy to optimize range of motion, function, and strength. I do not expect him to regain full motion or strength. However, I am hopeful that it will help improve her pain. I will see her back in the office in 6 weeks to re-evaluate her progress.  She should not take any oral anti-inflammatories due to kidney dysfunction.  She may take over-the-counter Tylenol as needed. She verbalizes understanding and agrees.    Follow up in about 6 weeks (around 7/17/2019).    Left Shoulder Injection Report:  After verbal consent was obtained for left shoulder injection, patient ID, site, and side were verified.  The left shoulder was sterilly prepped in the standard fashion.  A 22-gauge needle was introduced into left subacromial space from the posterior portal approach without complication. The left shoulder was then injected with 20 mg lidocaine plain and 80 mg depomedrol.  A sterile bandaid was applied.  The patient was informed to apply an ice pack approximately 10min once arriving home and not to do anything strenuous for 24hours. She was instructed to call if there were any problems. The patient was discharged in stable condition.    Right Shoulder Injection Report:  After verbal consent was obtained for right shoulder injection, patient ID, site, and side were verified.  The  right  Shoulder was sterilly prepped in the standard fashion.  A 22-gauge needle was introduced into right subacromial space from the posterior portal approach without complication. The right shoulder was then injected with 20 mg lidocaine plain and 80 mg depomedrol.  A sterile bandaid was applied.  The patient was informed to apply an ice pack approximately 10min once arriving home and not to do anything  strenuous for 24hours. She was instructed to call if there were any problems. The patient was discharged in stable condition.    The patient understands, chooses and consents to this plan and accepts all   the risks which include but are not limited to the risks mentioned above.     Disclaimer: This note was prepared using a voice recognition system and is likely to have sound alike errors within the text.

## 2019-06-28 RX ORDER — ATORVASTATIN CALCIUM 80 MG/1
TABLET, FILM COATED ORAL
Qty: 90 TABLET | Refills: 1 | Status: SHIPPED | OUTPATIENT
Start: 2019-06-28 | End: 2019-12-23

## 2019-07-03 ENCOUNTER — TELEPHONE (OUTPATIENT)
Dept: ORTHOPEDICS | Facility: CLINIC | Age: 69
End: 2019-07-03

## 2019-07-03 NOTE — TELEPHONE ENCOUNTER
Explained to Ronnie  nurse, that pt was referred to Ambulatory PT/OT, not . He asked if it was ok to discharge her from , and I explained that from an Orthopedic standpoint she had her as ambulatory, but I was not saying to d/c from . He verbalized understanding, AL, LPN.     ----- Message from Janie Cox sent at 7/3/2019  2:07 PM CDT -----  Contact: Ronnie- [facility]  Please call back at Ph [phone].

## 2019-07-03 NOTE — TELEPHONE ENCOUNTER
Attempted to reach out the  from [facility]. No answer. Left voice message in reference to whether the patient is to receive in home physical therapy or be seen in a clinic setting. Per DEA Briones patient is to have physical therapy in the clinic.//DG          ----- Message from Sarah Garcia sent at 7/3/2019 12:14 PM CDT -----  Contact: [facility]  needs to make sure patient needs to do outpatient physical therapy for rotor cuff or in home physical therapy...[phone]

## 2019-07-24 ENCOUNTER — OFFICE VISIT (OUTPATIENT)
Dept: ORTHOPEDICS | Facility: CLINIC | Age: 69
End: 2019-07-24
Payer: MEDICARE

## 2019-07-24 VITALS
SYSTOLIC BLOOD PRESSURE: 136 MMHG | WEIGHT: 129 LBS | HEART RATE: 93 BPM | DIASTOLIC BLOOD PRESSURE: 64 MMHG | BODY MASS INDEX: 26 KG/M2 | HEIGHT: 59 IN

## 2019-07-24 DIAGNOSIS — C50.919: Chronic | ICD-10-CM

## 2019-07-24 DIAGNOSIS — M12.812 ROTATOR CUFF ARTHROPATHY OF BOTH SHOULDERS: Primary | ICD-10-CM

## 2019-07-24 DIAGNOSIS — M25.512 CHRONIC PAIN OF BOTH SHOULDERS: ICD-10-CM

## 2019-07-24 DIAGNOSIS — M12.811 ROTATOR CUFF ARTHROPATHY OF BOTH SHOULDERS: Primary | ICD-10-CM

## 2019-07-24 DIAGNOSIS — M25.511 CHRONIC PAIN OF BOTH SHOULDERS: ICD-10-CM

## 2019-07-24 DIAGNOSIS — G89.29 CHRONIC PAIN OF BOTH SHOULDERS: ICD-10-CM

## 2019-07-24 PROCEDURE — 3078F DIAST BP <80 MM HG: CPT | Mod: HCNC,CPTII,S$GLB, | Performed by: PHYSICIAN ASSISTANT

## 2019-07-24 PROCEDURE — 99213 OFFICE O/P EST LOW 20 MIN: CPT | Mod: HCNC,S$GLB,, | Performed by: PHYSICIAN ASSISTANT

## 2019-07-24 PROCEDURE — 1101F PR PT FALLS ASSESS DOC 0-1 FALLS W/OUT INJ PAST YR: ICD-10-PCS | Mod: HCNC,CPTII,S$GLB, | Performed by: PHYSICIAN ASSISTANT

## 2019-07-24 PROCEDURE — 3078F PR MOST RECENT DIASTOLIC BLOOD PRESSURE < 80 MM HG: ICD-10-PCS | Mod: HCNC,CPTII,S$GLB, | Performed by: PHYSICIAN ASSISTANT

## 2019-07-24 PROCEDURE — 3075F PR MOST RECENT SYSTOLIC BLOOD PRESS GE 130-139MM HG: ICD-10-PCS | Mod: HCNC,CPTII,S$GLB, | Performed by: PHYSICIAN ASSISTANT

## 2019-07-24 PROCEDURE — 99999 PR PBB SHADOW E&M-EST. PATIENT-LVL IV: CPT | Mod: PBBFAC,HCNC,, | Performed by: PHYSICIAN ASSISTANT

## 2019-07-24 PROCEDURE — 3044F PR MOST RECENT HEMOGLOBIN A1C LEVEL <7.0%: ICD-10-PCS | Mod: HCNC,CPTII,S$GLB, | Performed by: PHYSICIAN ASSISTANT

## 2019-07-24 PROCEDURE — 99213 PR OFFICE/OUTPT VISIT, EST, LEVL III, 20-29 MIN: ICD-10-PCS | Mod: HCNC,S$GLB,, | Performed by: PHYSICIAN ASSISTANT

## 2019-07-24 PROCEDURE — 1101F PT FALLS ASSESS-DOCD LE1/YR: CPT | Mod: HCNC,CPTII,S$GLB, | Performed by: PHYSICIAN ASSISTANT

## 2019-07-24 PROCEDURE — 3075F SYST BP GE 130 - 139MM HG: CPT | Mod: HCNC,CPTII,S$GLB, | Performed by: PHYSICIAN ASSISTANT

## 2019-07-24 PROCEDURE — 3044F HG A1C LEVEL LT 7.0%: CPT | Mod: HCNC,CPTII,S$GLB, | Performed by: PHYSICIAN ASSISTANT

## 2019-07-24 PROCEDURE — 99999 PR PBB SHADOW E&M-EST. PATIENT-LVL IV: ICD-10-PCS | Mod: PBBFAC,HCNC,, | Performed by: PHYSICIAN ASSISTANT

## 2019-07-24 RX ORDER — ACITRETIN 10 MG/1
10 CAPSULE ORAL DAILY
Refills: 3 | Status: ON HOLD | COMMUNITY
Start: 2019-07-15 | End: 2021-08-04 | Stop reason: HOSPADM

## 2019-07-24 RX ORDER — DOXEPIN HYDROCHLORIDE 25 MG/1
25 CAPSULE ORAL EVERY 6 HOURS
Refills: 3 | COMMUNITY
Start: 2019-07-17 | End: 2020-07-06

## 2019-07-24 NOTE — PROGRESS NOTES
Patient ID: Luda Jc is a 68 y.o. female.    Chief Complaint: Pain of the Left Shoulder and Pain of the Right Shoulder      HPI: Luda Jc  is a 68 y.o. female who c/o Pain of the Left Shoulder and Pain of the Right Shoulder   for duration of years, but worse over the last few months.  Pain level is 9/10 in severity.  The left shoulder is giving her the most trouble.  The right shoulder is actually doing better.  I gave her injections on 06/05/2019 in both.  She had some relief on the left side.  It worked quite well on the right side. Unfortunately, pain and function continues to be an issue especially for the left side. Quality is throbbing and intermittent.  Alleviating factors include pain medications.  Aggravating factors include movement.    Past Medical History:   Diagnosis Date    Abnormal LFTs (liver function tests) 10/11/2014    Arthritis     Chronic kidney disease     Diabetes mellitus, type 2     Encounter for blood transfusion     Fatty liver     CT Abdomen/pelvis 6/24/2015---Fatty infiltration of the liver.    Hyperlipidemia     Hypertension     Insulin long-term use 10/11/2014    Nephrolithiasis     CT Abdomen/pelvis 6/24/2015---right nephrolithiasis.    Obesity     Stage II infiltrating ductal carcinoma of breast 6/16/2014    Followed by Dr. Virgen      Past Surgical History:   Procedure Laterality Date    APPENDECTOMY      BREAST LUMPECTOMY Right 2014    COLONOSCOPY N/A 10/9/2018    [facility]    ESOPHAGOGASTRODUODENOSCOPY (EGD) N/A 10/9/2018    [facility]    TONSILLECTOMY, ADENOIDECTOMY       Family History   Problem Relation Age of Onset    Cancer Mother         Stomach    Cancer Father         stomach    Heart disease Father     Stroke Son     Diabetes Paternal Aunt     Kidney disease Neg Hx      Social History     Socioeconomic History    Marital status:      Spouse name: Not on file     "Number of children: Not on file    Years of education: Not on file    Highest education level: Not on file   Occupational History    Not on file   Social Needs    Financial resource strain: Not on file    Food insecurity:     Worry: Not on file     Inability: Not on file    Transportation needs:     Medical: Not on file     Non-medical: Not on file   Tobacco Use    Smoking status: Never Smoker    Smokeless tobacco: Never Used   Substance and Sexual Activity    Alcohol use: Yes     Alcohol/week: 0.0 oz     Comment: " occassionally"    Drug use: No    Sexual activity: Never     Partners: Male     Birth control/protection: Post-menopausal   Lifestyle    Physical activity:     Days per week: Not on file     Minutes per session: Not on file    Stress: Not on file   Relationships    Social connections:     Talks on phone: Not on file     Gets together: Not on file     Attends Scientologist service: Not on file     Active member of club or organization: Not on file     Attends meetings of clubs or organizations: Not on file     Relationship status: Not on file   Other Topics Concern    Not on file   Social History Narrative    . Lives with spouse. Has 2 children. Patient works full time for [employer].      Medication List with Changes/Refills   Current Medications    ACCU-CHEK NICOLLE PLUS TEST STRP STRP    USE ONE STRIP TO CHECK GLUCOSE 2 TO 3 TIMES DAILY AS DIRECTED    ACCU-CHEK SOFT DEV LANCETS KIT    USE AS DIRECTED    ACITRETIN (SORIATANE) 10 MG CAPSULE    Take 10 mg by mouth once daily.    AMLODIPINE (NORVASC) 10 MG TABLET    TAKE 1 TABLET BY MOUTH ONCE DAILY    ATORVASTATIN (LIPITOR) 80 MG TABLET    TAKE 1 TABLET BY MOUTH ONCE DAILY    BLOOD-GLUCOSE METER KIT    Use as instructed    CAPECITABINE (XELODA) 500 MG TAB    Take 500 mg by mouth 2 (two) times daily.    CHLORTHALIDONE (HYGROTEN) 50 MG TAB    TAKE 1 TABLET BY MOUTH ONCE DAILY    DIPHENOXYLATE-ATROPINE 2.5-0.025 MG (LOMOTIL) 2.5-0.025 MG PER " "TABLET    Take 2 tablets by mouth 4 (four) times daily as needed.    DOXEPIN (SINEQUAN) 25 MG CAPSULE    Take 25 mg by mouth every 6 (six) hours.    FENOFIBRATE MICRONIZED (LOFIBRA) 200 MG CAP    TAKE 1 CAPSULE BY MOUTH WITH BREAKFAST ONCE DAILY    HYDROCODONE-ACETAMINOPHEN (NORCO)  MG PER TABLET    Take  mg by mouth 2 (two) times daily as needed.    INSULIN (LANTUS SOLOSTAR U-100 INSULIN) GLARGINE 100 UNITS/ML (3ML) SUBQ PEN    INJECT 34 UNITS SUBCUTANEOUSLY IN THE MORNING AND 30 IN THE EVENING    INSULIN ASPART U-100 (NOVOLOG FLEXPEN U-100 INSULIN) 100 UNIT/ML INPN PEN    INJECT 10 UNITS SUBCUTANEOUSLY THREE TIMES DAILY WITH MEALS    LANCETS (ACCU-CHEK SOFTCLIX LANCETS) MISC    USE AS DIRECTED TWICE DAILY    LISINOPRIL (PRINIVIL,ZESTRIL) 40 MG TABLET    TAKE 1 TABLET BY MOUTH TWICE DAILY    MAGNESIUM OXIDE (MAG-OX) 400 MG (241.3 MG MAGNESIUM) TABLET    Take 1 tablet (400 mg total) by mouth 3 (three) times daily.    METFORMIN (GLUCOPHAGE) 1000 MG TABLET    Take 0.5 tablets (500 mg total) by mouth 2 (two) times daily with meals.    METOPROLOL SUCCINATE (TOPROL-XL) 25 MG 24 HR TABLET    Take 25 mg by mouth 2 (two) times daily.    ONDANSETRON (ZOFRAN) 8 MG TABLET    Take 8 mg by mouth every 8 (eight) hours as needed.    OXYCODONE (ROXICODONE) 15 MG TAB    Take 15 mg by mouth every 4 (four) hours as needed.    PANTOPRAZOLE (PROTONIX) 40 MG TABLET    Take 1 tablet (40 mg total) by mouth once daily.    PEN NEEDLE, DIABETIC (BD ULTRA-FINE FATEMEH PEN NEEDLES) 32 GAUGE X 5/32" NDLE    Use once daily. 250.00 IDDM     Review of patient's allergies indicates:  No Known Allergies        Objective:        General    Nursing note and vitals reviewed.  Constitutional: She is oriented to person, place, and time. She appears well-developed and well-nourished.   HENT:   Head: Normocephalic and atraumatic.   Eyes: EOM are normal.   Cardiovascular: Normal rate and regular rhythm.    Pulmonary/Chest: Effort normal. "   Abdominal: Soft.   Neurological: She is alert and oriented to person, place, and time.   Psychiatric: She has a normal mood and affect. Her behavior is normal.         Right Shoulder Exam     Inspection/Observation   Swelling: absent  Bruising: absent  Scars: absent  Deformity: absent  Scapular Dyskinesia: negative    Tenderness   The patient is tender to palpation of the greater tuberosity and acromioclavicular joint.    Range of Motion   Active abduction:  90 abnormal   Passive abduction: abnormal   Extension: abnormal   Forward Flexion:  120 abnormal   Forward Elevation: abnormal  Adduction: abnormal  External Rotation 0 degrees: abnormal   Internal rotation 0 degrees:  Lumbar abnormal     Tests & Signs   Cross arm: negative  Drop arm: negative  Carranza test: positive  Impingement: positive  Active Compression Test ([place]'s Sign): positive  Speed's Test: positive    Other   Sensation: normal    Left Shoulder Exam     Inspection/Observation   Swelling: absent  Bruising: absent  Scars: absent  Deformity: absent  Scapular Dyskinesia: negative    Tenderness   The patient is tender to palpation of the greater tuberosity and acromioclavicular joint.    Range of Motion   Active abduction:  80 abnormal   Passive abduction: abnormal   Extension: abnormal   Forward Flexion:  80 abnormal   Forward Elevation: abnormal  Adduction: abnormal  External Rotation 0 degrees: abnormal   Internal rotation 0 degrees:  Sacrum abnormal     Tests & Signs   Cross arm: negative  Drop arm: negative  Carranza test: positive  Impingement: positive  Active Compression Test ([place]'s Sign): positive  Speed's Test: positive    Other   Sensation: normal       Muscle Strength   Right Upper Extremity   Shoulder Abduction: 5/5   Shoulder Internal Rotation: 4/5   Shoulder External Rotation: 4/5   Left Upper Extremity  Shoulder Abduction: 3/5   Shoulder Internal Rotation: 3/5   Shoulder External Rotation: 3/5     Vascular Exam     Right  Pulses      Radial:                    2+      Left Pulses      Radial:                    2+      Capillary Refill  Right Hand: normal capillary refill  Left Hand: normal capillary refill              Assessment:       Encounter Diagnoses   Name Primary?    Rotator cuff arthropathy of both shoulders Yes    Chronic pain of both shoulders     Uncontrolled type 2 diabetes mellitus with stage 3 chronic kidney disease, with long-term current use of insulin     Infiltrating ductal carcinoma of breast, stage 2, unspecified laterality           Plan:       Luda was seen today for pain and pain.    Diagnoses and all orders for this visit:    Rotator cuff arthropathy of both shoulders    Chronic pain of both shoulders    Uncontrolled type 2 diabetes mellitus with stage 3 chronic kidney disease, with long-term current use of insulin    Infiltrating ductal carcinoma of breast, stage 2, unspecified laterality        Luda Jc is an established pt here for follow-up on the above.  She says the right shoulder is doing well.  As for the left shoulder, we had a long discussion regarding her pain as well as diagnosis rotator cuff arthropathy.  She would potentially do well to consider surgical intervention for possible total shoulder replacement.  Obviously, she would need further workup prior to that decision being made.  However, she is actively being treated for breast cancer.  Her  says she is starting radiation in the near future.  She would obviously like to hold off on any sort of surgical intervention for the left shoulder until treatment of the breast cancer is completed.  She has an appointment with her oncologist next month.  I have encouraged her to talk to him in regards to timing for any sort of surgical intervention for the left shoulder.  We will discuss at her follow-up in 6 weeks.  She may continue physical therapy in the meantime.  She verbalizes understanding and agrees.    Follow up in about 6  weeks (around 9/4/2019) for no xray.          The patient understands, chooses and consents to this plan and accepts all   the risks which include but are not limited to the risks mentioned above.     Disclaimer: This note was prepared using a voice recognition system and is likely to have sound alike errors within the text.

## 2019-09-04 ENCOUNTER — OFFICE VISIT (OUTPATIENT)
Dept: ORTHOPEDICS | Facility: CLINIC | Age: 69
End: 2019-09-04
Payer: MEDICARE

## 2019-09-04 VITALS
SYSTOLIC BLOOD PRESSURE: 150 MMHG | WEIGHT: 129 LBS | HEIGHT: 59 IN | BODY MASS INDEX: 26 KG/M2 | DIASTOLIC BLOOD PRESSURE: 63 MMHG | HEART RATE: 83 BPM

## 2019-09-04 DIAGNOSIS — M12.812 ROTATOR CUFF ARTHROPATHY OF BOTH SHOULDERS: Primary | ICD-10-CM

## 2019-09-04 DIAGNOSIS — N18.30 CKD (CHRONIC KIDNEY DISEASE), STAGE III: ICD-10-CM

## 2019-09-04 DIAGNOSIS — M25.511 CHRONIC PAIN OF BOTH SHOULDERS: ICD-10-CM

## 2019-09-04 DIAGNOSIS — M25.512 CHRONIC PAIN OF BOTH SHOULDERS: ICD-10-CM

## 2019-09-04 DIAGNOSIS — M12.811 ROTATOR CUFF ARTHROPATHY OF BOTH SHOULDERS: Primary | ICD-10-CM

## 2019-09-04 DIAGNOSIS — G89.29 CHRONIC PAIN OF BOTH SHOULDERS: ICD-10-CM

## 2019-09-04 PROCEDURE — 3077F SYST BP >= 140 MM HG: CPT | Mod: HCNC,CPTII,S$GLB, | Performed by: PHYSICIAN ASSISTANT

## 2019-09-04 PROCEDURE — 3044F HG A1C LEVEL LT 7.0%: CPT | Mod: HCNC,CPTII,S$GLB, | Performed by: PHYSICIAN ASSISTANT

## 2019-09-04 PROCEDURE — 99499 RISK ADDL DX/OHS AUDIT: ICD-10-PCS | Mod: HCNC,S$GLB,, | Performed by: PHYSICIAN ASSISTANT

## 2019-09-04 PROCEDURE — 99499 UNLISTED E&M SERVICE: CPT | Mod: HCNC,S$GLB,, | Performed by: PHYSICIAN ASSISTANT

## 2019-09-04 PROCEDURE — 99999 PR PBB SHADOW E&M-EST. PATIENT-LVL IV: CPT | Mod: PBBFAC,HCNC,, | Performed by: PHYSICIAN ASSISTANT

## 2019-09-04 PROCEDURE — 1101F PR PT FALLS ASSESS DOC 0-1 FALLS W/OUT INJ PAST YR: ICD-10-PCS | Mod: HCNC,CPTII,S$GLB, | Performed by: PHYSICIAN ASSISTANT

## 2019-09-04 PROCEDURE — 20610 PR DRAIN/INJECT LARGE JOINT/BURSA: ICD-10-PCS | Mod: 50,HCNC,S$GLB, | Performed by: PHYSICIAN ASSISTANT

## 2019-09-04 PROCEDURE — 3078F PR MOST RECENT DIASTOLIC BLOOD PRESSURE < 80 MM HG: ICD-10-PCS | Mod: HCNC,CPTII,S$GLB, | Performed by: PHYSICIAN ASSISTANT

## 2019-09-04 PROCEDURE — 3044F PR MOST RECENT HEMOGLOBIN A1C LEVEL <7.0%: ICD-10-PCS | Mod: HCNC,CPTII,S$GLB, | Performed by: PHYSICIAN ASSISTANT

## 2019-09-04 PROCEDURE — 3078F DIAST BP <80 MM HG: CPT | Mod: HCNC,CPTII,S$GLB, | Performed by: PHYSICIAN ASSISTANT

## 2019-09-04 PROCEDURE — 99999 PR PBB SHADOW E&M-EST. PATIENT-LVL IV: ICD-10-PCS | Mod: PBBFAC,HCNC,, | Performed by: PHYSICIAN ASSISTANT

## 2019-09-04 PROCEDURE — 1101F PT FALLS ASSESS-DOCD LE1/YR: CPT | Mod: HCNC,CPTII,S$GLB, | Performed by: PHYSICIAN ASSISTANT

## 2019-09-04 PROCEDURE — 99214 PR OFFICE/OUTPT VISIT, EST, LEVL IV, 30-39 MIN: ICD-10-PCS | Mod: 25,HCNC,S$GLB, | Performed by: PHYSICIAN ASSISTANT

## 2019-09-04 PROCEDURE — 3077F PR MOST RECENT SYSTOLIC BLOOD PRESSURE >= 140 MM HG: ICD-10-PCS | Mod: HCNC,CPTII,S$GLB, | Performed by: PHYSICIAN ASSISTANT

## 2019-09-04 PROCEDURE — 99214 OFFICE O/P EST MOD 30 MIN: CPT | Mod: 25,HCNC,S$GLB, | Performed by: PHYSICIAN ASSISTANT

## 2019-09-04 PROCEDURE — 20610 DRAIN/INJ JOINT/BURSA W/O US: CPT | Mod: 50,HCNC,S$GLB, | Performed by: PHYSICIAN ASSISTANT

## 2019-09-04 RX ORDER — METHYLPREDNISOLONE ACETATE 80 MG/ML
80 INJECTION, SUSPENSION INTRA-ARTICULAR; INTRALESIONAL; INTRAMUSCULAR; SOFT TISSUE ONCE
Status: COMPLETED | OUTPATIENT
Start: 2019-09-04 | End: 2019-09-04

## 2019-09-04 RX ORDER — TRIAMCINOLONE ACETONIDE 1 MG/G
CREAM TOPICAL
Refills: 0 | COMMUNITY
Start: 2019-08-28

## 2019-09-04 RX ADMIN — METHYLPREDNISOLONE ACETATE 80 MG: 80 INJECTION, SUSPENSION INTRA-ARTICULAR; INTRALESIONAL; INTRAMUSCULAR; SOFT TISSUE at 01:09

## 2019-09-04 NOTE — PROGRESS NOTES
Patient ID: Luda Jc is a 68 y.o. female.    Chief Complaint: Pain of the Left Shoulder and Pain of the Right Shoulder      HPI: Luda Jc  is a 68 y.o. female who c/o Pain of the Left Shoulder and Pain of the Right Shoulder   for duration of chronically.  The left shoulder is worse than the right.  It is 9/10 in severity.  Worsened with range of motion above shoulder level.  She complains of associated stiffness.  Alleviating factors include corticosteroid injection which worked but briefly.  Aggravating factors as above.  She is also undergoing active breast cancer treatments.  She meets with her oncologist next week at [facility].  She has been working with physical therapy which she needs to give some relief as well. She has rotator cuff arthropathy bilaterally.  Ultimately, she may benefit from a surgical intervention for the left shoulder.  However, she is obviously concentrating on her breast cancer treatments.    Past Medical History:   Diagnosis Date    Abnormal LFTs (liver function tests) 10/11/2014    Arthritis     Chronic kidney disease     Diabetes mellitus, type 2     Encounter for blood transfusion     Fatty liver     CT Abdomen/pelvis 6/24/2015---Fatty infiltration of the liver.    Hyperlipidemia     Hypertension     Insulin long-term use 10/11/2014    Nephrolithiasis     CT Abdomen/pelvis 6/24/2015---right nephrolithiasis.    Obesity     Stage II infiltrating ductal carcinoma of breast 6/16/2014    Followed by Dr. Virgen      Past Surgical History:   Procedure Laterality Date    APPENDECTOMY      BREAST LUMPECTOMY Right 2014    COLONOSCOPY N/A 10/9/2018    [facility]    ESOPHAGOGASTRODUODENOSCOPY (EGD) N/A 10/9/2018    [facility] END[facility]    TONSILLECTOMY, ADENOIDECTOMY       Family History   Problem Relation Age of Onset    Cancer Mother         Stomach    Cancer Father         stomach    Heart disease  "Father     Stroke Son     Diabetes Paternal Aunt     Kidney disease Neg Hx      Social History     Socioeconomic History    Marital status:      Spouse name: Not on file    Number of children: Not on file    Years of education: Not on file    Highest education level: Not on file   Occupational History    Not on file   Social Needs    Financial resource strain: Not on file    Food insecurity:     Worry: Not on file     Inability: Not on file    Transportation needs:     Medical: Not on file     Non-medical: Not on file   Tobacco Use    Smoking status: Never Smoker    Smokeless tobacco: Never Used   Substance and Sexual Activity    Alcohol use: Yes     Alcohol/week: 0.0 oz     Comment: " occassionally"    Drug use: No    Sexual activity: Never     Partners: Male     Birth control/protection: Post-menopausal   Lifestyle    Physical activity:     Days per week: Not on file     Minutes per session: Not on file    Stress: Not on file   Relationships    Social connections:     Talks on phone: Not on file     Gets together: Not on file     Attends Synagogue service: Not on file     Active member of club or organization: Not on file     Attends meetings of clubs or organizations: Not on file     Relationship status: Not on file   Other Topics Concern    Not on file   Social History Narrative    . Lives with spouse. Has 2 children. Patient works full time for [employer].      Medication List with Changes/Refills   Current Medications    ACCU-CHEK NICOLLE PLUS TEST STRP STRP    USE ONE STRIP TO CHECK GLUCOSE 2 TO 3 TIMES DAILY AS DIRECTED    ACCU-CHEK SOFT DEV LANCETS KIT    USE AS DIRECTED    ACITRETIN (SORIATANE) 10 MG CAPSULE    Take 10 mg by mouth once daily.    AMLODIPINE (NORVASC) 10 MG TABLET    TAKE 1 TABLET BY MOUTH ONCE DAILY    ATORVASTATIN (LIPITOR) 80 MG TABLET    TAKE 1 TABLET BY MOUTH ONCE DAILY    BLOOD-GLUCOSE METER KIT    Use as instructed    CAPECITABINE (XELODA) 500 MG TAB    " "Take 500 mg by mouth 2 (two) times daily.    CHLORTHALIDONE (HYGROTEN) 50 MG TAB    TAKE 1 TABLET BY MOUTH ONCE DAILY    DIPHENOXYLATE-ATROPINE 2.5-0.025 MG (LOMOTIL) 2.5-0.025 MG PER TABLET    Take 2 tablets by mouth 4 (four) times daily as needed.    DOXEPIN (SINEQUAN) 25 MG CAPSULE    Take 25 mg by mouth every 6 (six) hours.    FENOFIBRATE MICRONIZED (LOFIBRA) 200 MG CAP    TAKE 1 CAPSULE BY MOUTH WITH BREAKFAST ONCE DAILY    HYDROCODONE-ACETAMINOPHEN (NORCO)  MG PER TABLET    Take  mg by mouth 2 (two) times daily as needed.    INSULIN (LANTUS SOLOSTAR U-100 INSULIN) GLARGINE 100 UNITS/ML (3ML) SUBQ PEN    INJECT 34 UNITS SUBCUTANEOUSLY IN THE MORNING AND 30 IN THE EVENING    INSULIN ASPART U-100 (NOVOLOG FLEXPEN U-100 INSULIN) 100 UNIT/ML INPN PEN    INJECT 10 UNITS SUBCUTANEOUSLY THREE TIMES DAILY WITH MEALS    LANCETS (ACCU-CHEK SOFTCLIX LANCETS) MISC    USE AS DIRECTED TWICE DAILY    LISINOPRIL (PRINIVIL,ZESTRIL) 40 MG TABLET    TAKE 1 TABLET BY MOUTH TWICE DAILY    MAGNESIUM OXIDE (MAG-OX) 400 MG (241.3 MG MAGNESIUM) TABLET    Take 1 tablet (400 mg total) by mouth 3 (three) times daily.    METFORMIN (GLUCOPHAGE) 1000 MG TABLET    Take 0.5 tablets (500 mg total) by mouth 2 (two) times daily with meals.    METOPROLOL SUCCINATE (TOPROL-XL) 25 MG 24 HR TABLET    Take 25 mg by mouth 2 (two) times daily.    ONDANSETRON (ZOFRAN) 8 MG TABLET    Take 8 mg by mouth every 8 (eight) hours as needed.    OXYCODONE (ROXICODONE) 15 MG TAB    Take 15 mg by mouth every 4 (four) hours as needed.    PANTOPRAZOLE (PROTONIX) 40 MG TABLET    Take 1 tablet (40 mg total) by mouth once daily.    PEN NEEDLE, DIABETIC (BD ULTRA-FINE FATEMEH PEN NEEDLES) 32 GAUGE X 5/32" NDLE    Use once daily. 250.00 IDDM    TRIAMCINOLONE ACETONIDE 0.1% (KENALOG) 0.1 % CREAM    APPLY ONE APPLICATION ON THE SKIN TWICE DAILY     Review of patient's allergies indicates:  No Known Allergies        Objective:        General    Nursing note and " vitals reviewed.  Constitutional: She is oriented to person, place, and time. She appears well-developed and well-nourished.   HENT:   Head: Normocephalic and atraumatic.   Eyes: EOM are normal.   Cardiovascular: Normal rate and regular rhythm.    Pulmonary/Chest: Effort normal.   Abdominal: Soft.   Neurological: She is alert and oriented to person, place, and time.   Psychiatric: She has a normal mood and affect. Her behavior is normal.         Right Shoulder Exam     Inspection/Observation   Swelling: absent  Bruising: absent  Scars: absent  Deformity: absent  Scapular Dyskinesia: negative    Tenderness   The patient is tender to palpation of the greater tuberosity and acromioclavicular joint.    Range of Motion   Active abduction:  130 abnormal   Passive abduction: abnormal   Extension: abnormal   Forward Flexion:  140 abnormal   Forward Elevation: abnormal  Adduction: abnormal  External Rotation 0 degrees: normal   Internal rotation 0 degrees:  Lumbar abnormal     Tests & Signs   Cross arm: negative  Drop arm: negative  Carranza test: positive  Impingement: positive  Active Compression Test (Mitchell's Sign): positive  Speed's Test: positive    Other   Sensation: normal    Left Shoulder Exam     Inspection/Observation   Swelling: absent  Bruising: absent  Scars: absent  Deformity: absent  Scapular Dyskinesia: negative    Tenderness   The patient is tender to palpation of the greater tuberosity and acromioclavicular joint.    Range of Motion   Active abduction:  80 abnormal   Passive abduction: abnormal   Extension: abnormal   Forward Flexion:  80 abnormal   Forward Elevation: abnormal  Adduction: abnormal  External Rotation 0 degrees: abnormal   Internal rotation 0 degrees:  Sacrum abnormal     Tests & Signs   Cross arm: negative  Drop arm: negative  Carranza test: positive  Impingement: positive  Active Compression Test (Mitchell's Sign): positive  Speed's Test: positive    Other   Sensation: normal       Muscle  Strength   Right Upper Extremity   Shoulder Abduction: 5/5   Shoulder Internal Rotation: 4/5   Shoulder External Rotation: 4/5   Left Upper Extremity  Shoulder Abduction: 3/5   Shoulder Internal Rotation: 3/5   Shoulder External Rotation: 3/5     Vascular Exam     Right Pulses      Radial:                    2+      Left Pulses      Radial:                    2+      Capillary Refill  Right Hand: normal capillary refill  Left Hand: normal capillary refill              Assessment:       Encounter Diagnoses   Name Primary?    Rotator cuff arthropathy of both shoulders Yes    Chronic pain of both shoulders     Uncontrolled type 2 diabetes mellitus with stage 3 chronic kidney disease, with long-term current use of insulin     CKD (chronic kidney disease), stage III           Plan:       Luda was seen today for pain and pain.    Diagnoses and all orders for this visit:    Rotator cuff arthropathy of both shoulders  -     methylPREDNISolone acetate injection 80 mg  -     methylPREDNISolone acetate injection 80 mg    Chronic pain of both shoulders  -     methylPREDNISolone acetate injection 80 mg  -     methylPREDNISolone acetate injection 80 mg    Uncontrolled type 2 diabetes mellitus with stage 3 chronic kidney disease, with long-term current use of insulin    CKD (chronic kidney disease), stage III        Luda GOMEZ Jc is an established pt here for follow-up on the above.  She is not a candidate for oral anti-inflammatories due to kidney disease. We have again discussed risks and benefits of repeating corticosteroid injections in the bilateral shoulders.  She wishes to proceed.  She will continue to monitor her blood sugars closely.  If she develops difficulty in managing her sugars, she will notify her primary care doctor.  I will see her back in the office in 3 months to touch base with her and see how she is doing.  She can always do periodic steroid injections until she is ready for more definitive management.  She verbalizes understanding and agrees.    Follow up in about 3 months (around 12/4/2019).        Left Shoulder Injection Report:  After verbal consent was obtained for left shoulder injection, patient ID, site, and side were verified.  The left shoulder was sterilly prepped in the standard fashion.  A 22-gauge needle was introduced into left subacromial space from the posterior portal approach without complication. The left shoulder was then injected with 20 mg lidocaine plain and 80 mg depomedrol.  A sterile bandaid was applied.  The patient was informed to apply an ice pack approximately 10min once arriving home and not to do anything strenuous for 24hours. She was instructed to call if there were any problems. The patient was discharged in stable condition.    Right Shoulder Injection Report:  After verbal consent was obtained for right shoulder injection, patient ID, site, and side were verified.  The  right  Shoulder was sterilly prepped in the standard fashion.  A 22-gauge needle was introduced into right subacromial space from the posterior portal approach without complication. The right shoulder was then injected with 20 mg lidocaine plain and 80 mg depomedrol.  A sterile bandaid was applied.  The patient was informed to apply an ice pack approximately 10min once arriving home and not to do anything strenuous for 24hours. She was instructed to call if there were any problems. The patient was discharged in stable condition.    The patient understands, chooses and consents to this plan and accepts all   the risks which include but are not limited to the risks mentioned above.     Disclaimer: This note was prepared using a voice recognition system and is likely to have sound alike errors within the text.

## 2019-09-04 NOTE — LETTER
September 4, 2019      Leanne Bonilla DO  [address] [place] [address] [place] [address]           [facility]  [address] [place] [address] [address]  [place] [address]  Phone: [phone]  Fax: [phone]          Patient: Luda Jc   MR Number: [id number]   YOB: 1950   Date of Visit: 9/4/2019       Dear Dr. Leanne Bonilla:    Thank you for referring Luda Jc to me for evaluation. Attached you will find relevant portions of my assessment and plan of care.    If you have questions, please do not hesitate to call me. I look forward to following Luda Jc along with you.    Sincerely,    Milly Cobos PA-C    Enclosure  CC:  No Recipients    If you would like to receive this communication electronically, please contact [email or web address] or [phone] to request more information on [employer] Link access.    For providers and/or their staff who would like to refer a patient to [facility], please contact us through our one-stop-shop provider referral line, Rufino Mendez, at [phone].    If you feel you have received this communication in error or would no longer like to receive these types of communications, please e-mail [email or web address]

## 2019-09-17 ENCOUNTER — PES CALL (OUTPATIENT)
Dept: ADMINISTRATIVE | Facility: CLINIC | Age: 69
End: 2019-09-17

## 2019-09-17 DIAGNOSIS — E11.29 DIABETES MELLITUS WITH MICROALBUMINURIA: ICD-10-CM

## 2019-09-17 DIAGNOSIS — R80.9 DIABETES MELLITUS WITH MICROALBUMINURIA: ICD-10-CM

## 2019-09-17 DIAGNOSIS — Z79.4 LONG-TERM INSULIN USE: ICD-10-CM

## 2019-09-17 RX ORDER — INSULIN ASPART 100 [IU]/ML
INJECTION, SOLUTION INTRAVENOUS; SUBCUTANEOUS
Qty: 15 ML | Refills: 3 | Status: SHIPPED | OUTPATIENT
Start: 2019-09-17 | End: 2021-01-04

## 2019-10-01 ENCOUNTER — OFFICE VISIT (OUTPATIENT)
Dept: INTERNAL MEDICINE | Facility: CLINIC | Age: 69
End: 2019-10-01
Payer: MEDICARE

## 2019-10-01 ENCOUNTER — HOSPITAL ENCOUNTER (OUTPATIENT)
Dept: RADIOLOGY | Facility: HOSPITAL | Age: 69
Discharge: HOME | End: 2019-10-01
Attending: INTERNAL MEDICINE
Payer: MEDICARE

## 2019-10-01 ENCOUNTER — CLINICAL SUPPORT (OUTPATIENT)
Dept: CARDIOLOGY | Facility: CLINIC | Age: 69
End: 2019-10-01
Payer: MEDICARE

## 2019-10-01 VITALS
WEIGHT: 137.38 LBS | HEART RATE: 92 BPM | BODY MASS INDEX: 27.69 KG/M2 | TEMPERATURE: 98 F | HEIGHT: 59 IN | DIASTOLIC BLOOD PRESSURE: 60 MMHG | SYSTOLIC BLOOD PRESSURE: 120 MMHG | OXYGEN SATURATION: 98 %

## 2019-10-01 DIAGNOSIS — E11.22 TYPE 2 DIABETES MELLITUS WITH STAGE 3 CHRONIC KIDNEY DISEASE, WITH LONG-TERM CURRENT USE OF INSULIN: ICD-10-CM

## 2019-10-01 DIAGNOSIS — D64.9 NORMOCYTIC ANEMIA: ICD-10-CM

## 2019-10-01 DIAGNOSIS — N18.30 TYPE 2 DIABETES MELLITUS WITH STAGE 3 CHRONIC KIDNEY DISEASE, WITH LONG-TERM CURRENT USE OF INSULIN: ICD-10-CM

## 2019-10-01 DIAGNOSIS — Z23 IMMUNIZATION DUE: ICD-10-CM

## 2019-10-01 DIAGNOSIS — Z01.818 PREOPERATIVE EXAMINATION: ICD-10-CM

## 2019-10-01 DIAGNOSIS — Z79.4 TYPE 2 DIABETES MELLITUS WITH STAGE 3 CHRONIC KIDNEY DISEASE, WITH LONG-TERM CURRENT USE OF INSULIN: ICD-10-CM

## 2019-10-01 DIAGNOSIS — I15.2 HYPERTENSION ASSOCIATED WITH DIABETES: ICD-10-CM

## 2019-10-01 DIAGNOSIS — E11.59 HYPERTENSION ASSOCIATED WITH DIABETES: ICD-10-CM

## 2019-10-01 DIAGNOSIS — Z85.3 HISTORY OF BREAST CANCER: ICD-10-CM

## 2019-10-01 DIAGNOSIS — Z01.818 PREOPERATIVE EXAMINATION: Primary | ICD-10-CM

## 2019-10-01 PROCEDURE — 3044F PR MOST RECENT HEMOGLOBIN A1C LEVEL <7.0%: ICD-10-PCS | Mod: HCNC,CPTII,S$GLB, | Performed by: INTERNAL MEDICINE

## 2019-10-01 PROCEDURE — 3078F PR MOST RECENT DIASTOLIC BLOOD PRESSURE < 80 MM HG: ICD-10-PCS | Mod: HCNC,CPTII,S$GLB, | Performed by: INTERNAL MEDICINE

## 2019-10-01 PROCEDURE — 71046 X-RAY EXAM CHEST 2 VIEWS: CPT | Mod: TC,HCNC

## 2019-10-01 PROCEDURE — 90662 FLU VACCINE - HIGH DOSE (65+) PRESERVATIVE FREE IM: ICD-10-PCS | Mod: HCNC,S$GLB,, | Performed by: INTERNAL MEDICINE

## 2019-10-01 PROCEDURE — 3044F HG A1C LEVEL LT 7.0%: CPT | Mod: HCNC,CPTII,S$GLB, | Performed by: INTERNAL MEDICINE

## 2019-10-01 PROCEDURE — 1101F PT FALLS ASSESS-DOCD LE1/YR: CPT | Mod: HCNC,CPTII,S$GLB, | Performed by: INTERNAL MEDICINE

## 2019-10-01 PROCEDURE — 90662 IIV NO PRSV INCREASED AG IM: CPT | Mod: HCNC,S$GLB,, | Performed by: INTERNAL MEDICINE

## 2019-10-01 PROCEDURE — 3074F SYST BP LT 130 MM HG: CPT | Mod: HCNC,CPTII,S$GLB, | Performed by: INTERNAL MEDICINE

## 2019-10-01 PROCEDURE — 71046 XR CHEST PA AND LATERAL: ICD-10-PCS | Mod: 26,HCNC,, | Performed by: RADIOLOGY

## 2019-10-01 PROCEDURE — 93005 ELECTROCARDIOGRAM TRACING: CPT | Mod: HCNC,S$GLB,, | Performed by: INTERNAL MEDICINE

## 2019-10-01 PROCEDURE — 3078F DIAST BP <80 MM HG: CPT | Mod: HCNC,CPTII,S$GLB, | Performed by: INTERNAL MEDICINE

## 2019-10-01 PROCEDURE — 99214 OFFICE O/P EST MOD 30 MIN: CPT | Mod: 25,HCNC,S$GLB, | Performed by: INTERNAL MEDICINE

## 2019-10-01 PROCEDURE — 93010 EKG 12-LEAD: ICD-10-PCS | Mod: HCNC,S$GLB,, | Performed by: INTERNAL MEDICINE

## 2019-10-01 PROCEDURE — 99999 PR PBB SHADOW E&M-EST. PATIENT-LVL III: CPT | Mod: PBBFAC,HCNC,, | Performed by: INTERNAL MEDICINE

## 2019-10-01 PROCEDURE — 93005 EKG 12-LEAD: ICD-10-PCS | Mod: HCNC,S$GLB,, | Performed by: INTERNAL MEDICINE

## 2019-10-01 PROCEDURE — 99999 PR PBB SHADOW E&M-EST. PATIENT-LVL III: ICD-10-PCS | Mod: PBBFAC,HCNC,, | Performed by: INTERNAL MEDICINE

## 2019-10-01 PROCEDURE — 99214 PR OFFICE/OUTPT VISIT, EST, LEVL IV, 30-39 MIN: ICD-10-PCS | Mod: 25,HCNC,S$GLB, | Performed by: INTERNAL MEDICINE

## 2019-10-01 PROCEDURE — 3074F PR MOST RECENT SYSTOLIC BLOOD PRESSURE < 130 MM HG: ICD-10-PCS | Mod: HCNC,CPTII,S$GLB, | Performed by: INTERNAL MEDICINE

## 2019-10-01 PROCEDURE — G0008 FLU VACCINE - HIGH DOSE (65+) PRESERVATIVE FREE IM: ICD-10-PCS | Mod: HCNC,S$GLB,, | Performed by: INTERNAL MEDICINE

## 2019-10-01 PROCEDURE — G0008 ADMIN INFLUENZA VIRUS VAC: HCPCS | Mod: HCNC,S$GLB,, | Performed by: INTERNAL MEDICINE

## 2019-10-01 PROCEDURE — 93010 ELECTROCARDIOGRAM REPORT: CPT | Mod: HCNC,S$GLB,, | Performed by: INTERNAL MEDICINE

## 2019-10-01 PROCEDURE — 71046 X-RAY EXAM CHEST 2 VIEWS: CPT | Mod: 26,HCNC,, | Performed by: RADIOLOGY

## 2019-10-01 PROCEDURE — 1101F PR PT FALLS ASSESS DOC 0-1 FALLS W/OUT INJ PAST YR: ICD-10-PCS | Mod: HCNC,CPTII,S$GLB, | Performed by: INTERNAL MEDICINE

## 2019-10-01 NOTE — PROGRESS NOTES
Subjective:       Patient ID: Luda Jc is a 68 y.o. female.    Chief Complaint: Pre-op Exam    Luda Jc  68 y.o. White female     Patient presents with:  Pre-op Exam    HPI: Here today for a preoperative evaluation. She will be having breast surgery by Dr. Jg Salinas. Her surgery has not yet been scheduled. She has a history of bilateral breast cancer.   She denies prior history of surgical or anesthetic complications.  HTN--stable on amlodipine, lisinopril, chlorthalidone and metoprolol.   Diabetes--stable on metformin and insulin.                      HGBA1C                   5.2                 03/20/2019            CKD III--stable on lisinopril.     Past Medical History:  10/11/2014: Abnormal LFTs (liver function tests)  Arthritis  Chronic kidney disease  Diabetes mellitus, type 2  Encounter for blood transfusion  Fatty liver      Comment:  CT Abdomen/pelvis 6/24/2015---Fatty infiltration of the                liver.  Hyperlipidemia  Hypertension  10/11/2014: Insulin long-term use  Nephrolithiasis      Comment:  CT Abdomen/pelvis 6/24/2015---right nephrolithiasis.  Obesity  06/16/2014: Stage II infiltrating ductal carcinoma of breast      Comment:  Followed by Dr. Virgen/Dr. DAVEY Salinas    Past Surgical History:  APPENDECTOMY  2014: BREAST LUMPECTOMY; Right  10/9/2018: COLONOSCOPY; N/A      Comment:  Procedure: COLONOSCOPY;  Surgeon: Branden Carpenter MD;                 Location: [facility];  Service: Endoscopy;  Laterality:                N/A;  10/9/2018: ESOPHAGOGASTRODUODENOSCOPY; N/A      Comment:  Procedure: ESOPHAGOGASTRODUODENOSCOPY (EGD);  Surgeon:                Branden Carpenter MD;  Location: [facility];  Service:                Endoscopy;  Laterality: N/A;  TONSILLECTOMY, ADENOIDECTOMY    Review of patient's family history indicates:  Problem: Cancer      Relation: Mother          Age of Onset: (Not Specified)          Comment: Stomach  Problem: Cancer      Relation: Father          Age of  Onset: (Not Specified)          Comment: stomach  Problem: Heart disease      Relation: Father          Age of Onset: (Not Specified)  Problem: Stroke      Relation: Son          Age of Onset: (Not Specified)  Problem: Diabetes      Relation: Paternal Aunt          Age of Onset: (Not Specified)  Problem: Kidney disease      Relation: Neg Hx          Age of Onset: (Not Specified)    Current Outpatient Medications on File Prior to Visit:  ACCU-CHEK NICOLLE PLUS TEST STRP Strp, USE ONE STRIP TO CHECK GLUCOSE 2 TO 3 TIMES DAILY AS DIRECTED, Disp: 300 strip, Rfl: 3  ACCU-CHEK SOFT DEV LANCETS Kit, USE AS DIRECTED, Disp: 1 each, Rfl: 0  acitretin (SORIATANE) 10 MG capsule, Take 10 mg by mouth once daily., Disp: , Rfl: 3  amLODIPine (NORVASC) 10 MG tablet, TAKE 1 TABLET BY MOUTH ONCE DAILY, Disp: 90 tablet, Rfl: 1  atorvastatin (LIPITOR) 80 MG tablet, TAKE 1 TABLET BY MOUTH ONCE DAILY, Disp: 90 tablet, Rfl: 1  blood-glucose meter kit, Use as instructed, Disp: 1 each, Rfl: 0  chlorthalidone (HYGROTEN) 50 MG Tab, TAKE 1 TABLET BY MOUTH ONCE DAILY, Disp: 30 tablet, Rfl: 6  doxepin (SINEQUAN) 25 MG capsule, Take 25 mg by mouth every 6 (six) hours., Disp: , Rfl: 3  fenofibrate micronized (LOFIBRA) 200 MG Cap, TAKE 1 CAPSULE BY MOUTH WITH BREAKFAST ONCE DAILY, Disp: 90 capsule, Rfl: 1  insulin (LANTUS SOLOSTAR U-100 INSULIN) glargine 100 units/mL (3mL) SubQ pen, INJECT 34 UNITS SUBCUTANEOUSLY IN THE MORNING AND 30 IN THE EVENING, Disp: 2 Box, Rfl: 6  insulin aspart U-100 (NOVOLOG FLEXPEN U-100 INSULIN) 100 unit/mL (3 mL) InPn pen, INJECT 10 UNITS SUBCUTANEOUSLY THREE TIMES DAILY WITH MEALS, Disp: 15 mL, Rfl: 3  lancets (ACCU-CHEK SOFTCLIX LANCETS) Misc, USE AS DIRECTED TWICE DAILY, Disp: 200 each, Rfl: 3  lisinopril (PRINIVIL,ZESTRIL) 40 MG tablet, TAKE 1 TABLET BY MOUTH TWICE DAILY, Disp: 180 tablet, Rfl: 2  magnesium oxide (MAG-OX) 400 mg (241.3 mg magnesium) tablet, Take 1 tablet (400 mg total) by mouth 3 (three) times daily.,  "Disp: 45 tablet, Rfl: 0  metFORMIN (GLUCOPHAGE) 1000 MG tablet, Take 0.5 tablets (500 mg total) by mouth 2 (two) times daily with meals., Disp: 180 tablet, Rfl: 1  metoprolol succinate (TOPROL-XL) 25 MG 24 hr tablet, Take 25 mg by mouth 2 (two) times daily., Disp: , Rfl:   ondansetron (ZOFRAN) 8 MG tablet, Take 8 mg by mouth every 8 (eight) hours as needed., Disp: , Rfl:   oxyCODONE (ROXICODONE) 15 MG Tab, Take 15 mg by mouth every 4 (four) hours as needed., Disp: , Rfl:   pantoprazole (PROTONIX) 40 MG tablet, Take 1 tablet (40 mg total) by mouth once daily., Disp: 30 tablet, Rfl: 11  pen needle, diabetic (BD ULTRA-FINE FATEMEH PEN NEEDLES) 32 gauge x 5/32" Ndle, Use once daily. 250.00 IDDM, Disp: 100 each, Rfl: 3  triamcinolone acetonide 0.1% (KENALOG) 0.1 % cream, APPLY ONE APPLICATION ON THE SKIN TWICE DAILY, Disp: , Rfl: 0  capecitabine (XELODA) 500 MG Tab, Take 500 mg by mouth 2 (two) times daily., Disp: , Rfl:   diphenoxylate-atropine 2.5-0.025 mg (LOMOTIL) 2.5-0.025 mg per tablet, Take 2 tablets by mouth 4 (four) times daily as needed., Disp: , Rfl:   HYDROcodone-acetaminophen (NORCO)  mg per tablet, Take  mg by mouth 2 (two) times daily as needed., Disp: , Rfl:     Allergies:  Review of patient's allergies indicates:  No Known Allergies      Review of Systems   Constitutional: Negative for chills and fever.   HENT: Positive for hearing loss (left ear). Negative for congestion, ear pain, sinus pain and sore throat.    Respiratory: Negative for cough and shortness of breath.    Cardiovascular: Negative for chest pain and leg swelling.   Gastrointestinal: Negative for abdominal pain, blood in stool and diarrhea.   Genitourinary: Negative for difficulty urinating and dysuria.   Neurological: Negative for dizziness.       Objective:      Physical Exam   Constitutional: She is oriented to person, place, and time. She appears well-developed and well-nourished. No distress.   HENT:   Mouth/Throat: No " oropharyngeal exudate.   Bilateral TM's intact, external canals patent   Eyes: No scleral icterus.   Neck: No tracheal deviation present. No thyromegaly present.   Cardiovascular: Normal rate, regular rhythm and normal heart sounds.   Pulmonary/Chest: Effort normal and breath sounds normal. No respiratory distress. She has no wheezes. She has no rales.   Abdominal: Soft. Bowel sounds are normal.   Musculoskeletal: She exhibits no edema.   Lymphadenopathy:     She has no cervical adenopathy.   Neurological: She is alert and oriented to person, place, and time.   Skin: Skin is warm and dry.   Psychiatric: She has a normal mood and affect.   Vitals reviewed.      Assessment:       1. Preoperative examination    2. Hypertension associated with diabetes    3. Type 2 diabetes mellitus with stage 3 chronic kidney disease, with long-term current use of insulin    4. Normocytic anemia    5. History of breast cancer    6. Immunization due        Plan:       Luda was seen today for pre-op exam.    Diagnoses and all orders for this visit:    Preoperative examination  -     CBC auto differential; Future  -     CMP; Future   -     SCHEDULED EKG 12-LEAD (to Muse); Future  -     X-Ray Chest PA And Lateral; Future    Hypertension associated with diabetes    Type 2 diabetes mellitus with stage 3 chronic kidney disease, with long-term current use of insulin    Normocytic anemia    History of breast cancer    Immunization due   -     Influenza - High Dose (65+) (PF) (IM)    Schedule labs, CXR and EKG.     Recommendations to follow.     Addendum:   Reviewed labs, EKG and CXR. Labs show stable anemia and slightly worsened diabetes. EKG shows normal sinus rhythm. CXR does not show any acute findings.   Proceed with surgery as planned.

## 2019-10-17 RX ORDER — PANTOPRAZOLE SODIUM 40 MG/1
TABLET, DELAYED RELEASE ORAL
Qty: 30 TABLET | Refills: 11 | Status: SHIPPED | OUTPATIENT
Start: 2019-10-17 | End: 2020-10-19

## 2019-11-04 RX ORDER — AMLODIPINE BESYLATE 10 MG/1
TABLET ORAL
Qty: 90 TABLET | Refills: 1 | OUTPATIENT
Start: 2019-11-04

## 2019-11-12 RX ORDER — AMLODIPINE BESYLATE 10 MG/1
TABLET ORAL
Qty: 90 TABLET | Refills: 1 | OUTPATIENT
Start: 2019-11-12

## 2019-11-16 DIAGNOSIS — I10 HYPERTENSION GOAL BP (BLOOD PRESSURE) < 130/80: Chronic | ICD-10-CM

## 2019-11-18 RX ORDER — LISINOPRIL 40 MG/1
TABLET ORAL
Qty: 180 TABLET | Refills: 2 | OUTPATIENT
Start: 2019-11-18

## 2019-11-22 DIAGNOSIS — I10 HYPERTENSION GOAL BP (BLOOD PRESSURE) < 130/80: Chronic | ICD-10-CM

## 2019-11-25 RX ORDER — LISINOPRIL 40 MG/1
TABLET ORAL
Qty: 180 TABLET | Refills: 2 | OUTPATIENT
Start: 2019-11-25

## 2019-11-25 RX ORDER — CHLORTHALIDONE 50 MG/1
TABLET ORAL
Qty: 30 TABLET | Refills: 6 | Status: SHIPPED | OUTPATIENT
Start: 2019-11-25 | End: 2020-06-19

## 2019-12-07 DIAGNOSIS — I10 HYPERTENSION GOAL BP (BLOOD PRESSURE) < 130/80: Chronic | ICD-10-CM

## 2019-12-09 RX ORDER — LISINOPRIL 40 MG/1
TABLET ORAL
Qty: 180 TABLET | Refills: 2 | OUTPATIENT
Start: 2019-12-09

## 2019-12-09 RX ORDER — AMLODIPINE BESYLATE 10 MG/1
TABLET ORAL
Qty: 90 TABLET | Refills: 1 | OUTPATIENT
Start: 2019-12-09

## 2019-12-23 RX ORDER — ATORVASTATIN CALCIUM 80 MG/1
TABLET, FILM COATED ORAL
Qty: 90 TABLET | Refills: 1 | Status: SHIPPED | OUTPATIENT
Start: 2019-12-23 | End: 2020-06-15

## 2020-01-05 DIAGNOSIS — M79.18 MUSCULOSKELETAL PAIN: ICD-10-CM

## 2020-01-07 RX ORDER — CYCLOBENZAPRINE HCL 5 MG
TABLET ORAL
Refills: 0 | OUTPATIENT
Start: 2020-01-07

## 2020-01-17 RX ORDER — FENOFIBRATE 200 MG/1
CAPSULE ORAL
Refills: 0 | OUTPATIENT
Start: 2020-01-17

## 2020-01-22 DIAGNOSIS — M79.18 MUSCULOSKELETAL PAIN: ICD-10-CM

## 2020-01-22 RX ORDER — CYCLOBENZAPRINE HCL 5 MG
TABLET ORAL
Refills: 0 | OUTPATIENT
Start: 2020-01-22

## 2020-02-01 DIAGNOSIS — M79.18 MUSCULOSKELETAL PAIN: ICD-10-CM

## 2020-02-03 RX ORDER — CYCLOBENZAPRINE HCL 5 MG
TABLET ORAL
Refills: 0 | OUTPATIENT
Start: 2020-02-03

## 2020-02-03 RX ORDER — FENOFIBRATE 200 MG/1
CAPSULE ORAL
Qty: 90 CAPSULE | Refills: 1 | Status: SHIPPED | OUTPATIENT
Start: 2020-02-03 | End: 2020-08-03

## 2020-02-24 LAB
CHOLEST SERPL-MSCNC: 150 MG/DL (ref 0–200)
HDLC SERPL-MCNC: 19 MG/DL
LDLC SERPL CALC-MCNC: 59 MG/DL
NON HDL CHOL. (LDL+VLDL): 131
TRIGL SERPL-MCNC: 360 MG/DL

## 2020-02-24 RX ORDER — AMLODIPINE BESYLATE 10 MG/1
TABLET ORAL
Qty: 30 TABLET | Refills: 0 | Status: SHIPPED | OUTPATIENT
Start: 2020-02-24 | End: 2020-03-23

## 2020-02-26 ENCOUNTER — PATIENT OUTREACH (OUTPATIENT)
Dept: ADMINISTRATIVE | Facility: HOSPITAL | Age: 70
End: 2020-02-26

## 2020-03-02 RX ORDER — METFORMIN HYDROCHLORIDE 1000 MG/1
500 TABLET ORAL 2 TIMES DAILY WITH MEALS
Qty: 180 TABLET | Refills: 1 | Status: SHIPPED | OUTPATIENT
Start: 2020-03-02 | End: 2020-10-06

## 2020-03-02 NOTE — TELEPHONE ENCOUNTER
----- Message from Matt Ortiz sent at 3/2/2020  9:03 AM CST -----  Contact: Pt   Type:  RX Refill Request    Who Called:  Pt   Refill or New Rx:refill   RX Name and Strength:metformin 1000mg   How is the patient currently taking it? (ex. 1XDay):twice daily   Is this a 30 day or 90 day RX: 30 days  Preferred Pharmacy with phone number:[facility] in [place]  Local or Mail Order: local   Ordering Provider:daniel   Would the patient rather a call back or a response via [facility]? Phone   Best Call Back Number: [phone]  Additional Information:

## 2020-03-23 RX ORDER — AMLODIPINE BESYLATE 10 MG/1
TABLET ORAL
Qty: 30 TABLET | Refills: 6 | Status: SHIPPED | OUTPATIENT
Start: 2020-03-23 | End: 2020-11-07

## 2020-04-15 ENCOUNTER — PATIENT MESSAGE (OUTPATIENT)
Dept: INTERNAL MEDICINE | Facility: CLINIC | Age: 70
End: 2020-04-15

## 2020-05-10 DIAGNOSIS — R80.9 DIABETES MELLITUS WITH MICROALBUMINURIA: ICD-10-CM

## 2020-05-10 DIAGNOSIS — E78.2 COMBINED HYPERLIPIDEMIA ASSOCIATED WITH TYPE 2 DIABETES MELLITUS: ICD-10-CM

## 2020-05-10 DIAGNOSIS — E11.59 HYPERTENSION ASSOCIATED WITH DIABETES: ICD-10-CM

## 2020-05-10 DIAGNOSIS — Z79.4 TYPE 2 DIABETES MELLITUS WITH STAGE 3 CHRONIC KIDNEY DISEASE, WITH LONG-TERM CURRENT USE OF INSULIN: ICD-10-CM

## 2020-05-10 DIAGNOSIS — N18.30 TYPE 2 DIABETES MELLITUS WITH STAGE 3 CHRONIC KIDNEY DISEASE, WITH LONG-TERM CURRENT USE OF INSULIN: ICD-10-CM

## 2020-05-10 DIAGNOSIS — E11.69 COMBINED HYPERLIPIDEMIA ASSOCIATED WITH TYPE 2 DIABETES MELLITUS: ICD-10-CM

## 2020-05-10 DIAGNOSIS — E11.22 TYPE 2 DIABETES MELLITUS WITH STAGE 3 CHRONIC KIDNEY DISEASE, WITH LONG-TERM CURRENT USE OF INSULIN: ICD-10-CM

## 2020-05-10 DIAGNOSIS — E11.29 DIABETES MELLITUS WITH MICROALBUMINURIA: ICD-10-CM

## 2020-05-10 DIAGNOSIS — I15.2 HYPERTENSION ASSOCIATED WITH DIABETES: ICD-10-CM

## 2020-05-11 RX ORDER — INSULIN GLARGINE 100 [IU]/ML
INJECTION, SOLUTION SUBCUTANEOUS
Qty: 30 ML | Refills: 0 | Status: SHIPPED | OUTPATIENT
Start: 2020-05-11 | End: 2020-07-30

## 2020-06-18 ENCOUNTER — PATIENT OUTREACH (OUTPATIENT)
Dept: ADMINISTRATIVE | Facility: HOSPITAL | Age: 70
End: 2020-06-18

## 2020-06-18 NOTE — PROGRESS NOTES
I called pt to schedule A1c, urine Micro albumin, mammogram, eye exam and dexa scan. I was unsuccessful with reaching pt LVM.

## 2020-07-06 ENCOUNTER — OFFICE VISIT (OUTPATIENT)
Dept: INTERNAL MEDICINE | Facility: CLINIC | Age: 70
End: 2020-07-06
Payer: MEDICARE

## 2020-07-06 VITALS
SYSTOLIC BLOOD PRESSURE: 154 MMHG | TEMPERATURE: 99 F | HEIGHT: 59 IN | WEIGHT: 128.75 LBS | BODY MASS INDEX: 25.96 KG/M2 | DIASTOLIC BLOOD PRESSURE: 66 MMHG | HEART RATE: 97 BPM | OXYGEN SATURATION: 96 %

## 2020-07-06 DIAGNOSIS — Z79.4 TYPE 2 DIABETES MELLITUS WITH STAGE 3 CHRONIC KIDNEY DISEASE, WITH LONG-TERM CURRENT USE OF INSULIN: ICD-10-CM

## 2020-07-06 DIAGNOSIS — D69.6 THROMBOCYTOPENIA: ICD-10-CM

## 2020-07-06 DIAGNOSIS — E11.29 DIABETES MELLITUS WITH PROTEINURIA: ICD-10-CM

## 2020-07-06 DIAGNOSIS — D64.9 NORMOCYTIC ANEMIA: ICD-10-CM

## 2020-07-06 DIAGNOSIS — N18.30 TYPE 2 DIABETES MELLITUS WITH STAGE 3 CHRONIC KIDNEY DISEASE, WITH LONG-TERM CURRENT USE OF INSULIN: ICD-10-CM

## 2020-07-06 DIAGNOSIS — I10 HYPERTENSION GOAL BP (BLOOD PRESSURE) < 130/80: Primary | ICD-10-CM

## 2020-07-06 DIAGNOSIS — E11.22 TYPE 2 DIABETES MELLITUS WITH STAGE 3 CHRONIC KIDNEY DISEASE, WITH LONG-TERM CURRENT USE OF INSULIN: ICD-10-CM

## 2020-07-06 DIAGNOSIS — R80.9 DIABETES MELLITUS WITH PROTEINURIA: ICD-10-CM

## 2020-07-06 DIAGNOSIS — E78.5 HYPERLIPIDEMIA LDL GOAL <70: ICD-10-CM

## 2020-07-06 PROCEDURE — 3077F SYST BP >= 140 MM HG: CPT | Mod: HCNC,CPTII,S$GLB, | Performed by: INTERNAL MEDICINE

## 2020-07-06 PROCEDURE — 3078F DIAST BP <80 MM HG: CPT | Mod: HCNC,CPTII,S$GLB, | Performed by: INTERNAL MEDICINE

## 2020-07-06 PROCEDURE — 1159F MED LIST DOCD IN RCRD: CPT | Mod: HCNC,S$GLB,, | Performed by: INTERNAL MEDICINE

## 2020-07-06 PROCEDURE — 3044F PR MOST RECENT HEMOGLOBIN A1C LEVEL <7.0%: ICD-10-PCS | Mod: HCNC,CPTII,S$GLB, | Performed by: INTERNAL MEDICINE

## 2020-07-06 PROCEDURE — 1125F AMNT PAIN NOTED PAIN PRSNT: CPT | Mod: HCNC,S$GLB,, | Performed by: INTERNAL MEDICINE

## 2020-07-06 PROCEDURE — 99499 UNLISTED E&M SERVICE: CPT | Mod: S$GLB,,, | Performed by: INTERNAL MEDICINE

## 2020-07-06 PROCEDURE — 3044F HG A1C LEVEL LT 7.0%: CPT | Mod: HCNC,CPTII,S$GLB, | Performed by: INTERNAL MEDICINE

## 2020-07-06 PROCEDURE — 1101F PR PT FALLS ASSESS DOC 0-1 FALLS W/OUT INJ PAST YR: ICD-10-PCS | Mod: HCNC,CPTII,S$GLB, | Performed by: INTERNAL MEDICINE

## 2020-07-06 PROCEDURE — 99214 OFFICE O/P EST MOD 30 MIN: CPT | Mod: HCNC,S$GLB,, | Performed by: INTERNAL MEDICINE

## 2020-07-06 PROCEDURE — 99999 PR PBB SHADOW E&M-EST. PATIENT-LVL V: CPT | Mod: PBBFAC,HCNC,, | Performed by: INTERNAL MEDICINE

## 2020-07-06 PROCEDURE — 1125F PR PAIN SEVERITY QUANTIFIED, PAIN PRESENT: ICD-10-PCS | Mod: HCNC,S$GLB,, | Performed by: INTERNAL MEDICINE

## 2020-07-06 PROCEDURE — 3008F BODY MASS INDEX DOCD: CPT | Mod: HCNC,CPTII,S$GLB, | Performed by: INTERNAL MEDICINE

## 2020-07-06 PROCEDURE — 99214 PR OFFICE/OUTPT VISIT, EST, LEVL IV, 30-39 MIN: ICD-10-PCS | Mod: HCNC,S$GLB,, | Performed by: INTERNAL MEDICINE

## 2020-07-06 PROCEDURE — 99999 PR PBB SHADOW E&M-EST. PATIENT-LVL V: ICD-10-PCS | Mod: PBBFAC,HCNC,, | Performed by: INTERNAL MEDICINE

## 2020-07-06 PROCEDURE — 1159F PR MEDICATION LIST DOCUMENTED IN MEDICAL RECORD: ICD-10-PCS | Mod: HCNC,S$GLB,, | Performed by: INTERNAL MEDICINE

## 2020-07-06 PROCEDURE — 99499 RISK ADDL DX/OHS AUDIT: ICD-10-PCS | Mod: S$GLB,,, | Performed by: INTERNAL MEDICINE

## 2020-07-06 PROCEDURE — 3077F PR MOST RECENT SYSTOLIC BLOOD PRESSURE >= 140 MM HG: ICD-10-PCS | Mod: HCNC,CPTII,S$GLB, | Performed by: INTERNAL MEDICINE

## 2020-07-06 PROCEDURE — 3008F PR BODY MASS INDEX (BMI) DOCUMENTED: ICD-10-PCS | Mod: HCNC,CPTII,S$GLB, | Performed by: INTERNAL MEDICINE

## 2020-07-06 PROCEDURE — 1101F PT FALLS ASSESS-DOCD LE1/YR: CPT | Mod: HCNC,CPTII,S$GLB, | Performed by: INTERNAL MEDICINE

## 2020-07-06 PROCEDURE — 3078F PR MOST RECENT DIASTOLIC BLOOD PRESSURE < 80 MM HG: ICD-10-PCS | Mod: HCNC,CPTII,S$GLB, | Performed by: INTERNAL MEDICINE

## 2020-07-06 RX ORDER — LISINOPRIL 40 MG/1
40 TABLET ORAL 2 TIMES DAILY
Qty: 180 TABLET | Refills: 2 | Status: SHIPPED | OUTPATIENT
Start: 2020-07-06 | End: 2021-04-20

## 2020-07-06 RX ORDER — TAMOXIFEN CITRATE 20 MG/1
20 TABLET ORAL
COMMUNITY
Start: 2020-06-24

## 2020-07-06 NOTE — PROGRESS NOTES
Subjective:       Patient ID: Luda Jc is a 69 y.o. female.    Chief Complaint: Annual Exam    Luda Jc  69 y.o. White female     Patient presents with:  Annual Exam    HPI: Here today for an annual physical.   HTN--elevated. She reports elevated readings at home. She is taking amlodipine and chlorthalidone but she is no longer taking lisinopril and metoprolol. She is not sure why not.   Diabetes--compliant with Lantus and metformin.                  HGBA1C                   6.4 (H)             10/01/2019            CKD III/proteinuria--she denies taking NSAID's. She denies symptoms. She is not taking lisinopril as prescribed.   HLD--compliant with atorvastatin.                     LDLCALC                  59                  02/24/2020                 CHOL                     150                 02/24/2020                 TRIG                     360                 02/24/2020                 HDL                      19                  02/24/2020                 ALT                      14                  10/01/2019                 AST                      26                  10/01/2019                 NA                       141                 10/01/2019                 K                        4.1                 10/01/2019                 CL                       104                 10/01/2019                 CREATININE               1.3                 10/01/2019                 BUN                      24 (H)              10/01/2019                 CO2                      25                  10/01/2019            Thrombocytopenia--stable. She is managed by hematology. Her recent platelet count was 180.   Anemia--management per hematology. Her recent H/H was 10.1/30.4 and MCV 84.         DEXA SCAN due on 04/24/2019  Eye Exam due on 05/22/2019  Mammogram due on 10/11/2019  Foot Exam due on 02/28/2020  Urine Microalbumin due on 03/22/2020  Hemoglobin A1c due on 04/01/2020  Lipid Panel due on  02/24/2021  High Dose Statin due on 07/06/2021  TETANUS VACCINE due on 04/28/2027  Hepatitis C Screening Completed  Pneumococcal Vaccine (65+ High/Highest Risk) Completed    Past Medical History:  10/11/2014: Abnormal LFTs (liver function tests)  Arthritis  Chronic kidney disease  Diabetes mellitus, type 2  Encounter for blood transfusion  Fatty liver      Comment:  CT Abdomen/pelvis 6/24/2015---Fatty infiltration of the                liver.  Hyperlipidemia  Hypertension  10/11/2014: Insulin long-term use  Nephrolithiasis      Comment:  CT Abdomen/pelvis 6/24/2015---right nephrolithiasis.  Obesity  06/16/2014: Stage II infiltrating ductal carcinoma of breast      Comment:  Followed by Dr. Virgen/Dr. DAVEY Salinas    Past Surgical History:  APPENDECTOMY  2019: BILATERAL MASTECTOMY  2014: BREAST LUMPECTOMY; Right  10/9/2018: COLONOSCOPY; N/A      Comment:  Procedure: COLONOSCOPY;  Surgeon: Branden Carpenter MD;                 Location: [facility];  Service: Endoscopy;  Laterality:                N/A;  10/9/2018: ESOPHAGOGASTRODUODENOSCOPY; N/A      Comment:  Procedure: ESOPHAGOGASTRODUODENOSCOPY (EGD);  Surgeon:                Branden Carpenter MD;  Location: [facility];  Service:                Endoscopy;  Laterality: N/A;  TONSILLECTOMY, ADENOIDECTOMY    Review of patient's family history indicates:  Problem: Cancer      Relation: Mother          Age of Onset: (Not Specified)          Comment: Stomach  Problem: Cancer      Relation: Father          Age of Onset: (Not Specified)          Comment: stomach  Problem: Heart disease      Relation: Father          Age of Onset: (Not Specified)  Problem: Stroke      Relation: Son          Age of Onset: (Not Specified)  Problem: Diabetes      Relation: Paternal Aunt          Age of Onset: (Not Specified)  Problem: Kidney disease      Relation: Neg Hx          Age of Onset: (Not Specified)      Current Outpatient Medications on File Prior to Visit:  ACCU-CHEK NICOLLE PLUS TEST STRP  "Strp, USE ONE STRIP TO CHECK GLUCOSE 2 TO 3 TIMES DAILY AS DIRECTED, Disp: 300 strip, Rfl: 3  ACCU-CHEK SOFT DEV LANCETS Kit, USE AS DIRECTED, Disp: 1 each, Rfl: 0  acitretin (SORIATANE) 10 MG capsule, Take 10 mg by mouth once daily., Disp: , Rfl: 3  amLODIPine (NORVASC) 10 MG tablet, Take 1 tablet by mouth once daily, Disp: 30 tablet, Rfl: 6  atorvastatin (LIPITOR) 80 MG tablet, Take 1 tablet by mouth once daily, Disp: 90 tablet, Rfl: 0  blood-glucose meter kit, Use as instructed, Disp: 1 each, Rfl: 0  chlorthalidone (HYGROTEN) 50 MG Tab, Take 1 tablet by mouth once daily, Disp: 30 tablet, Rfl: 3  fenofibrate micronized (LOFIBRA) 200 MG Cap, TAKE 1 CAPSULE BY MOUTH ONCE DAILY WITH BREAKFAST, Disp: 90 capsule, Rfl: 1  insulin (LANTUS SOLOSTAR U-100 INSULIN) glargine 100 units/mL (3mL) SubQ pen, INJECT 34 UNITS SUBCUTANEOUSLY IN THE MORNING AND INJECT 30 UNITS IN THE EVENING, Disp: 30 mL, Rfl: 0  insulin aspart U-100 (NOVOLOG FLEXPEN U-100 INSULIN) 100 unit/mL (3 mL) InPn pen, INJECT 10 UNITS SUBCUTANEOUSLY THREE TIMES DAILY WITH MEALS, Disp: 15 mL, Rfl: 3  lancets (ACCU-CHEK SOFTCLIX LANCETS) Misc, USE AS DIRECTED TWICE DAILY, Disp: 200 each, Rfl: 3  magnesium oxide (MAG-OX) 400 mg (241.3 mg magnesium) tablet, Take 1 tablet (400 mg total) by mouth 3 (three) times daily., Disp: 45 tablet, Rfl: 0  metFORMIN (GLUCOPHAGE) 1000 MG tablet, Take 0.5 tablets (500 mg total) by mouth 2 (two) times daily with meals., Disp: 180 tablet, Rfl: 1  pantoprazole (PROTONIX) 40 MG tablet, TAKE 1 TABLET BY MOUTH ONCE DAILY, Disp: 30 tablet, Rfl: 11  pen needle, diabetic (BD ULTRA-FINE FATEMEH PEN NEEDLES) 32 gauge x 5/32" Ndle, Use once daily. 250.00 IDDM, Disp: 100 each, Rfl: 3  tamoxifen (NOLVADEX) 20 MG Tab, Take 20 mg by mouth., Disp: , Rfl:   ondansetron (ZOFRAN) 8 MG tablet, Take 8 mg by mouth every 8 (eight) hours as needed., Disp: , Rfl:   oxyCODONE (ROXICODONE) 15 MG Tab, Take 15 mg by mouth every 4 (four) hours as needed., Disp: " , Rfl:   triamcinolone acetonide 0.1% (KENALOG) 0.1 % cream, APPLY ONE APPLICATION ON THE SKIN TWICE DAILY, Disp: , Rfl: 0    Allergies:  Review of patient's allergies indicates:  No Known Allergies              Review of Systems   Constitutional: Negative for fever.   Respiratory: Negative for cough and shortness of breath.    Cardiovascular: Negative for chest pain and leg swelling.   Genitourinary: Negative for difficulty urinating.   Neurological: Negative for dizziness and headaches.         Objective:      Physical Exam  Constitutional:       General: She is not in acute distress.     Appearance: She is well-developed.   Eyes:      General: No scleral icterus.  Cardiovascular:      Rate and Rhythm: Normal rate.   Pulmonary:      Effort: Pulmonary effort is normal. No respiratory distress.   Neurological:      Mental Status: She is alert and oriented to person, place, and time.   Psychiatric:         Behavior: Behavior normal.         Thought Content: Thought content normal.         Judgment: Judgment normal.         Assessment:       1. Hypertension goal BP (blood pressure) < 130/80    2. Type 2 diabetes mellitus with stage 3 chronic kidney disease, with long-term current use of insulin    3. Diabetes mellitus with proteinuria    4. Hyperlipidemia LDL goal <70    5. Thrombocytopenia    6. Normocytic anemia        Plan:       Luda was seen today for annual exam.    Diagnoses and all orders for this visit:    Hypertension goal BP (blood pressure) < 130/80  -     Comprehensive metabolic panel; Standing  -     Lipid Panel; Standing  -     Restart lisinopriL (PRINIVIL,ZESTRIL) 40 MG tablet; Take 1 tablet (40 mg total) by mouth 2 (two) times daily.    Type 2 diabetes mellitus with stage 3 chronic kidney disease, with long-term current use of insulin  -     Comprehensive metabolic panel; Standing  -     Hemoglobin A1C; Standing  -     Lipid Panel; Standing  -     Restart lisinopriL (PRINIVIL,ZESTRIL) 40 MG tablet;  Take 1 tablet (40 mg total) by mouth 2 (two) times daily.    Diabetes mellitus with proteinuria  -     Microalbumin/creatinine urine ratio; Standing  -     Comprehensive metabolic panel; Standing  -     Hemoglobin A1C; Standing  -     Lipid Panel; Standing  -     lisinopriL (PRINIVIL,ZESTRIL) 40 MG tablet; Take 1 tablet (40 mg total) by mouth 2 (two) times daily.    Hyperlipidemia LDL goal <70  -     Comprehensive metabolic panel; Standing  -     Lipid Panel; Standing    Thrombocytopenia  -     Continue to monitor    Normocytic anemia  -     Management per hematology     F/U in 4 weeks for HTN.

## 2020-07-08 ENCOUNTER — LAB VISIT (OUTPATIENT)
Dept: LAB | Facility: HOSPITAL | Age: 70
End: 2020-07-08
Attending: INTERNAL MEDICINE
Payer: MEDICARE

## 2020-07-08 DIAGNOSIS — N18.30 TYPE 2 DIABETES MELLITUS WITH STAGE 3 CHRONIC KIDNEY DISEASE, WITH LONG-TERM CURRENT USE OF INSULIN: ICD-10-CM

## 2020-07-08 DIAGNOSIS — R80.9 DIABETES MELLITUS WITH PROTEINURIA: ICD-10-CM

## 2020-07-08 DIAGNOSIS — E78.5 HYPERLIPIDEMIA LDL GOAL <70: ICD-10-CM

## 2020-07-08 DIAGNOSIS — Z79.4 TYPE 2 DIABETES MELLITUS WITH STAGE 3 CHRONIC KIDNEY DISEASE, WITH LONG-TERM CURRENT USE OF INSULIN: ICD-10-CM

## 2020-07-08 DIAGNOSIS — E11.29 DIABETES MELLITUS WITH PROTEINURIA: ICD-10-CM

## 2020-07-08 DIAGNOSIS — I10 HYPERTENSION GOAL BP (BLOOD PRESSURE) < 130/80: ICD-10-CM

## 2020-07-08 DIAGNOSIS — E11.22 TYPE 2 DIABETES MELLITUS WITH STAGE 3 CHRONIC KIDNEY DISEASE, WITH LONG-TERM CURRENT USE OF INSULIN: ICD-10-CM

## 2020-07-08 LAB
ALBUMIN SERPL BCP-MCNC: 3.8 G/DL (ref 3.5–5.2)
ALP SERPL-CCNC: 32 U/L (ref 55–135)
ALT SERPL W/O P-5'-P-CCNC: 9 U/L (ref 10–44)
ANION GAP SERPL CALC-SCNC: 10 MMOL/L (ref 8–16)
AST SERPL-CCNC: 20 U/L (ref 10–40)
BILIRUB SERPL-MCNC: 0.5 MG/DL (ref 0.1–1)
BUN SERPL-MCNC: 34 MG/DL (ref 8–23)
CALCIUM SERPL-MCNC: 8.8 MG/DL (ref 8.7–10.5)
CHLORIDE SERPL-SCNC: 104 MMOL/L (ref 95–110)
CHOLEST SERPL-MCNC: 132 MG/DL (ref 120–199)
CHOLEST/HDLC SERPL: 6.3 {RATIO} (ref 2–5)
CO2 SERPL-SCNC: 26 MMOL/L (ref 23–29)
CREAT SERPL-MCNC: 1.6 MG/DL (ref 0.5–1.4)
EST. GFR  (AFRICAN AMERICAN): 37.4 ML/MIN/1.73 M^2
EST. GFR  (NON AFRICAN AMERICAN): 32.4 ML/MIN/1.73 M^2
GLUCOSE SERPL-MCNC: 33 MG/DL (ref 70–110)
HDLC SERPL-MCNC: 21 MG/DL (ref 40–75)
HDLC SERPL: 15.9 % (ref 20–50)
LDLC SERPL CALC-MCNC: 44.4 MG/DL (ref 63–159)
NONHDLC SERPL-MCNC: 111 MG/DL
POTASSIUM SERPL-SCNC: 3.7 MMOL/L (ref 3.5–5.1)
PROT SERPL-MCNC: 6.1 G/DL (ref 6–8.4)
SODIUM SERPL-SCNC: 140 MMOL/L (ref 136–145)
TRIGL SERPL-MCNC: 333 MG/DL (ref 30–150)

## 2020-07-08 PROCEDURE — 80053 COMPREHEN METABOLIC PANEL: CPT | Mod: HCNC

## 2020-07-08 PROCEDURE — 83036 HEMOGLOBIN GLYCOSYLATED A1C: CPT | Mod: HCNC

## 2020-07-08 PROCEDURE — 36415 COLL VENOUS BLD VENIPUNCTURE: CPT | Mod: HCNC

## 2020-07-08 PROCEDURE — 82043 UR ALBUMIN QUANTITATIVE: CPT | Mod: HCNC

## 2020-07-08 PROCEDURE — 80061 LIPID PANEL: CPT | Mod: HCNC

## 2020-07-09 LAB
ALBUMIN/CREAT UR: 27 UG/MG (ref 0–30)
CREAT UR-MCNC: 89 MG/DL (ref 15–325)
ESTIMATED AVG GLUCOSE: 123 MG/DL (ref 68–131)
HBA1C MFR BLD HPLC: 5.9 % (ref 4–5.6)
MICROALBUMIN UR DL<=1MG/L-MCNC: 24 UG/ML

## 2020-07-14 ENCOUNTER — TELEPHONE (OUTPATIENT)
Dept: INTERNAL MEDICINE | Facility: CLINIC | Age: 70
End: 2020-07-14

## 2020-07-14 NOTE — TELEPHONE ENCOUNTER
Spoke to patient and advised her of the results. She voiced her understanding and had no further questions.  Explained to patient about the insulin instructions.

## 2020-07-14 NOTE — TELEPHONE ENCOUNTER
----- Message from Dilshad Lane sent at 7/14/2020  3:23 PM CDT -----  Regarding: Missed Call  Contact: Luda Jc  Pt missed a call from your office     Pt can be reached at [phone]

## 2020-08-10 ENCOUNTER — OFFICE VISIT (OUTPATIENT)
Dept: INTERNAL MEDICINE | Facility: CLINIC | Age: 70
End: 2020-08-10
Payer: MEDICARE

## 2020-08-10 VITALS
HEIGHT: 59 IN | BODY MASS INDEX: 25.52 KG/M2 | WEIGHT: 126.56 LBS | SYSTOLIC BLOOD PRESSURE: 130 MMHG | HEART RATE: 94 BPM | DIASTOLIC BLOOD PRESSURE: 90 MMHG | TEMPERATURE: 99 F | OXYGEN SATURATION: 96 %

## 2020-08-10 DIAGNOSIS — H61.22 IMPACTED CERUMEN OF LEFT EAR: ICD-10-CM

## 2020-08-10 DIAGNOSIS — I10 HYPERTENSION GOAL BP (BLOOD PRESSURE) < 130/80: Primary | ICD-10-CM

## 2020-08-10 PROCEDURE — 3075F PR MOST RECENT SYSTOLIC BLOOD PRESS GE 130-139MM HG: ICD-10-PCS | Mod: HCNC,CPTII,S$GLB, | Performed by: INTERNAL MEDICINE

## 2020-08-10 PROCEDURE — 1159F MED LIST DOCD IN RCRD: CPT | Mod: HCNC,S$GLB,, | Performed by: INTERNAL MEDICINE

## 2020-08-10 PROCEDURE — 1101F PR PT FALLS ASSESS DOC 0-1 FALLS W/OUT INJ PAST YR: ICD-10-PCS | Mod: HCNC,CPTII,S$GLB, | Performed by: INTERNAL MEDICINE

## 2020-08-10 PROCEDURE — 1126F PR PAIN SEVERITY QUANTIFIED, NO PAIN PRESENT: ICD-10-PCS | Mod: HCNC,S$GLB,, | Performed by: INTERNAL MEDICINE

## 2020-08-10 PROCEDURE — 99214 PR OFFICE/OUTPT VISIT, EST, LEVL IV, 30-39 MIN: ICD-10-PCS | Mod: HCNC,S$GLB,, | Performed by: INTERNAL MEDICINE

## 2020-08-10 PROCEDURE — 99999 PR PBB SHADOW E&M-EST. PATIENT-LVL V: ICD-10-PCS | Mod: PBBFAC,HCNC,, | Performed by: INTERNAL MEDICINE

## 2020-08-10 PROCEDURE — 3075F SYST BP GE 130 - 139MM HG: CPT | Mod: HCNC,CPTII,S$GLB, | Performed by: INTERNAL MEDICINE

## 2020-08-10 PROCEDURE — 3008F BODY MASS INDEX DOCD: CPT | Mod: HCNC,CPTII,S$GLB, | Performed by: INTERNAL MEDICINE

## 2020-08-10 PROCEDURE — 3008F PR BODY MASS INDEX (BMI) DOCUMENTED: ICD-10-PCS | Mod: HCNC,CPTII,S$GLB, | Performed by: INTERNAL MEDICINE

## 2020-08-10 PROCEDURE — 1101F PT FALLS ASSESS-DOCD LE1/YR: CPT | Mod: HCNC,CPTII,S$GLB, | Performed by: INTERNAL MEDICINE

## 2020-08-10 PROCEDURE — 99214 OFFICE O/P EST MOD 30 MIN: CPT | Mod: HCNC,S$GLB,, | Performed by: INTERNAL MEDICINE

## 2020-08-10 PROCEDURE — 3080F DIAST BP >= 90 MM HG: CPT | Mod: HCNC,CPTII,S$GLB, | Performed by: INTERNAL MEDICINE

## 2020-08-10 PROCEDURE — 99999 PR PBB SHADOW E&M-EST. PATIENT-LVL V: CPT | Mod: PBBFAC,HCNC,, | Performed by: INTERNAL MEDICINE

## 2020-08-10 PROCEDURE — 1126F AMNT PAIN NOTED NONE PRSNT: CPT | Mod: HCNC,S$GLB,, | Performed by: INTERNAL MEDICINE

## 2020-08-10 PROCEDURE — 3080F PR MOST RECENT DIASTOLIC BLOOD PRESSURE >= 90 MM HG: ICD-10-PCS | Mod: HCNC,CPTII,S$GLB, | Performed by: INTERNAL MEDICINE

## 2020-08-10 PROCEDURE — 1159F PR MEDICATION LIST DOCUMENTED IN MEDICAL RECORD: ICD-10-PCS | Mod: HCNC,S$GLB,, | Performed by: INTERNAL MEDICINE

## 2020-08-10 RX ORDER — DOXEPIN HYDROCHLORIDE 25 MG/1
CAPSULE ORAL
Status: ON HOLD | COMMUNITY
Start: 2020-08-07 | End: 2021-08-04 | Stop reason: HOSPADM

## 2020-08-10 RX ORDER — METOPROLOL SUCCINATE 25 MG/1
25 TABLET, EXTENDED RELEASE ORAL DAILY
Qty: 30 TABLET | Refills: 1 | Status: SHIPPED | OUTPATIENT
Start: 2020-08-10 | End: 2020-09-14 | Stop reason: SDUPTHER

## 2020-08-10 RX ORDER — LORATADINE 10 MG/1
10 TABLET ORAL DAILY
Status: ON HOLD | COMMUNITY
End: 2021-08-04 | Stop reason: HOSPADM

## 2020-08-10 NOTE — PATIENT INSTRUCTIONS
Carbamide Peroxide ear solution (Debrox)  What is this medicine?  CARBAMIDE PEROXIDE (CAR oliva mide per OX gertrudis) is used to soften and help remove ear wax.  How should I use this medicine?  This medicine is only for use in the outer ear canal. Follow the directions carefully. Wash hands before and after use. The solution may be warmed by holding the bottle in the hand for 1 to 2 minutes. Lie with the affected ear facing upward. Place the proper number of drops into the ear canal. After the drops are instilled, remain lying with the affected ear upward for 5 minutes to help the drops stay in the ear canal. A cotton ball may be gently inserted at the ear opening for no longer than 5 to 10 minutes to ensure retention. Repeat, if necessary, for the opposite ear. Do not touch the tip of the dropper to the ear, fingertips, or other surface. Do not rinse the dropper after use. Keep container tightly closed.  Talk to your pediatrician regarding the use of this medicine in children. While this drug may be used in children as young as 12 years for selected conditions, precautions do apply.  What side effects may I notice from receiving this medicine?  Side effects that you should report to your doctor or health care professional as soon as possible:  · allergic reactions like skin rash, itching or hives, swelling of the face, lips, or tongue  · burning, itching, and redness  · worsening ear pain  · rash  Side effects that usually do not require medical attention (report to your doctor or health care professional if they continue or are bothersome):  · abnormal sensation while putting the drops in the ear  · temporary reduction in hearing (but not complete loss of hearing)  What may interact with this medicine?  Interactions are not expected. Do not use any other ear products without asking your doctor or health care professional.  What if I miss a dose?  If you miss a dose, use it as soon as you can. If it is almost time for  your next dose, use only that dose. Do not use double or extra doses.  Where should I keep my medicine?  Keep out of the reach of children.  Store at room temperature between 15 and 30 degrees C (59 and 86 degrees F) in a tight, light-resistant container. Keep bottle away from excessive heat and direct sunlight. Throw away any unused medicine after the expiration date.  What should I tell my health care provider before I take this medicine?  They need to know if you have any of these conditions:  · dizziness  · ear discharge  · ear pain, irritation or rash  · infection  · perforated eardrum (hole in eardrum)  · an unusual or allergic reaction to carbamide peroxide, glycerin, hydrogen peroxide, other medicines, foods, dyes, or preservatives  · pregnant or trying to get pregnant  · breast-feeding  What should I watch for while using this medicine?  This medicine is not for long-term use. Do not use for more than 4 days without checking with your health care professional. Contact your doctor or health care professional if your condition does not start to get better within a few days or if you notice burning, redness, itching or swelling.  NOTE:This sheet is a summary. It may not cover all possible information. If you have questions about this medicine, talk to your doctor, pharmacist, or health care provider. Copyright© 2017 [employer]

## 2020-08-10 NOTE — PROGRESS NOTES
Coffeen PATRICIA Jc  69 y.o. White female    Chief Complaint   Patient presents with    Follow-up     4 week      HPI: Presents to the clinic for a 4 week f/u on HTN. Her b/p remains uncontrolled. She checks it at home but does not recall her readings. She has been compliant with amlodipine, lisinopril and chlorthalidone. She was on metoprolol in the past but no longer takes it and she does not know why.   She has been having left ear discomfort and imbalance. She would like her ear checked.     Past Medical History:   Diagnosis Date    Abnormal LFTs (liver function tests) 10/11/2014    Arthritis     Chronic kidney disease     Diabetes mellitus, type 2     Encounter for blood transfusion     Fatty liver     CT Abdomen/pelvis 6/24/2015---Fatty infiltration of the liver.    Hyperlipidemia     Hypertension     Insulin long-term use 10/11/2014    Nephrolithiasis     CT Abdomen/pelvis 6/24/2015---right nephrolithiasis.    Obesity     Stage II infiltrating ductal carcinoma of breast 06/16/2014    Followed by Dr. Virgen/Dr. DAVEY Salinas       Current Outpatient Medications on File Prior to Visit   Medication Sig Dispense Refill    ACCU-CHEK NICOLLE PLUS TEST STRP Strp USE ONE STRIP TO CHECK GLUCOSE 2 TO 3 TIMES DAILY AS DIRECTED 300 strip 3    ACCU-CHEK SOFT DEV LANCETS Kit USE AS DIRECTED 1 each 0    amLODIPine (NORVASC) 10 MG tablet Take 1 tablet by mouth once daily 30 tablet 6    atorvastatin (LIPITOR) 80 MG tablet Take 1 tablet by mouth once daily 90 tablet 0    blood-glucose meter kit Use as instructed 1 each 0    chlorthalidone (HYGROTEN) 50 MG Tab Take 1 tablet by mouth once daily 30 tablet 3    doxepin (SINEQUAN) 25 MG capsule       fenofibrate micronized (LOFIBRA) 200 MG Cap TAKE 1 CAPSULE BY MOUTH ONCE DAILY WITH BREAKFAST 90 capsule 1    insulin (LANTUS SOLOSTAR U-100 INSULIN) glargine 100 units/mL (3mL) SubQ pen INJECT 34 UNITS SUBCUTANEOUSLY IN THE MORNING AND 30 UNITS SUBCUTANEOUSLY IN THE EVENING  "30 mL 3    insulin aspart U-100 (NOVOLOG FLEXPEN U-100 INSULIN) 100 unit/mL (3 mL) InPn pen INJECT 10 UNITS SUBCUTANEOUSLY THREE TIMES DAILY WITH MEALS 15 mL 3    lancets (ACCU-CHEK SOFTCLIX LANCETS) Misc USE AS DIRECTED TWICE DAILY 200 each 3    lisinopriL (PRINIVIL,ZESTRIL) 40 MG tablet Take 1 tablet (40 mg total) by mouth 2 (two) times daily. 180 tablet 2    loratadine (CLARITIN) 10 mg tablet Take 10 mg by mouth once daily.      magnesium oxide (MAG-OX) 400 mg (241.3 mg magnesium) tablet Take 1 tablet (400 mg total) by mouth 3 (three) times daily. 45 tablet 0    metFORMIN (GLUCOPHAGE) 1000 MG tablet Take 0.5 tablets (500 mg total) by mouth 2 (two) times daily with meals. 180 tablet 1    oxyCODONE (ROXICODONE) 15 MG Tab Take 15 mg by mouth every 4 (four) hours as needed.      pantoprazole (PROTONIX) 40 MG tablet TAKE 1 TABLET BY MOUTH ONCE DAILY 30 tablet 11    pen needle, diabetic (BD ULTRA-FINE FATEMEH PEN NEEDLES) 32 gauge x 5/32" Ndle Use once daily. 250.00 IDDM 100 each 3    tamoxifen (NOLVADEX) 20 MG Tab Take 20 mg by mouth.      triamcinolone acetonide 0.1% (KENALOG) 0.1 % cream APPLY ONE APPLICATION ON THE SKIN TWICE DAILY  0    acitretin (SORIATANE) 10 MG capsule Take 10 mg by mouth once daily.  3    ondansetron (ZOFRAN) 8 MG tablet Take 8 mg by mouth every 8 (eight) hours as needed.       Allergies:  Review of patient's allergies indicates:  No Known Allergies    ROS:  Denies headache, chest pain or shortness of breath    PHYSICAL EXAM:  VITAL SIGNS: Reviewed  GENERAL: Alert and oriented, no acute distress  EAR: Left cerumen impaction  HEART: Regular rate   LUNGS: Respirations unlabored  FOOT EXAM:   Protective Sensation (w/ 10 gram monofilament):  Right: Intact  Left: Intact    Visual Inspection:  Callus -  Bilateral    Pedal Pulses:   Right: Present  Left: Present    ASSESSMENT/PLAN:  Luda was seen today for follow-up.    Diagnoses and all orders for this visit:    Hypertension goal BP " (blood pressure) < 130/80  -     Start metoprolol succinate (TOPROL-XL) 25 MG 24 hr tablet; Take 1 tablet (25 mg total) by mouth once daily.  -     Continue lisinopril, amlodipine and chlorthalidone    Impacted cerumen of left ear  -     Recommend Debrox    RTC in 4 weeks for HTN.

## 2020-08-26 ENCOUNTER — PATIENT OUTREACH (OUTPATIENT)
Dept: ADMINISTRATIVE | Facility: HOSPITAL | Age: 70
End: 2020-08-26

## 2020-09-14 ENCOUNTER — OFFICE VISIT (OUTPATIENT)
Dept: INTERNAL MEDICINE | Facility: CLINIC | Age: 70
End: 2020-09-14
Payer: MEDICARE

## 2020-09-14 VITALS
SYSTOLIC BLOOD PRESSURE: 120 MMHG | TEMPERATURE: 99 F | HEART RATE: 102 BPM | BODY MASS INDEX: 25.46 KG/M2 | OXYGEN SATURATION: 97 % | DIASTOLIC BLOOD PRESSURE: 66 MMHG | WEIGHT: 126.31 LBS | HEIGHT: 59 IN

## 2020-09-14 DIAGNOSIS — Z23 IMMUNIZATION DUE: ICD-10-CM

## 2020-09-14 DIAGNOSIS — I70.0 ATHEROSCLEROSIS OF AORTA: ICD-10-CM

## 2020-09-14 DIAGNOSIS — I10 HYPERTENSION GOAL BP (BLOOD PRESSURE) < 130/80: Primary | ICD-10-CM

## 2020-09-14 DIAGNOSIS — C50.919: ICD-10-CM

## 2020-09-14 DIAGNOSIS — I10 HYPERTENSION GOAL BP (BLOOD PRESSURE) < 130/80: ICD-10-CM

## 2020-09-14 PROBLEM — E44.1 MILD MALNUTRITION: Status: RESOLVED | Noted: 2019-03-20 | Resolved: 2020-09-14

## 2020-09-14 PROCEDURE — 90694 VACC AIIV4 NO PRSRV 0.5ML IM: CPT | Mod: HCNC,S$GLB,, | Performed by: INTERNAL MEDICINE

## 2020-09-14 PROCEDURE — 90694 FLU VACCINE - QUADRIVALENT - ADJUVANTED: ICD-10-PCS | Mod: HCNC,S$GLB,, | Performed by: INTERNAL MEDICINE

## 2020-09-14 PROCEDURE — 3008F PR BODY MASS INDEX (BMI) DOCUMENTED: ICD-10-PCS | Mod: HCNC,CPTII,S$GLB, | Performed by: INTERNAL MEDICINE

## 2020-09-14 PROCEDURE — 1101F PR PT FALLS ASSESS DOC 0-1 FALLS W/OUT INJ PAST YR: ICD-10-PCS | Mod: HCNC,CPTII,S$GLB, | Performed by: INTERNAL MEDICINE

## 2020-09-14 PROCEDURE — 99213 PR OFFICE/OUTPT VISIT, EST, LEVL III, 20-29 MIN: ICD-10-PCS | Mod: 25,HCNC,S$GLB, | Performed by: INTERNAL MEDICINE

## 2020-09-14 PROCEDURE — G0008 ADMIN INFLUENZA VIRUS VAC: HCPCS | Mod: HCNC,S$GLB,, | Performed by: INTERNAL MEDICINE

## 2020-09-14 PROCEDURE — 1126F PR PAIN SEVERITY QUANTIFIED, NO PAIN PRESENT: ICD-10-PCS | Mod: HCNC,S$GLB,, | Performed by: INTERNAL MEDICINE

## 2020-09-14 PROCEDURE — 3078F PR MOST RECENT DIASTOLIC BLOOD PRESSURE < 80 MM HG: ICD-10-PCS | Mod: HCNC,CPTII,S$GLB, | Performed by: INTERNAL MEDICINE

## 2020-09-14 PROCEDURE — 3008F BODY MASS INDEX DOCD: CPT | Mod: HCNC,CPTII,S$GLB, | Performed by: INTERNAL MEDICINE

## 2020-09-14 PROCEDURE — 1159F PR MEDICATION LIST DOCUMENTED IN MEDICAL RECORD: ICD-10-PCS | Mod: HCNC,S$GLB,, | Performed by: INTERNAL MEDICINE

## 2020-09-14 PROCEDURE — 99999 PR PBB SHADOW E&M-EST. PATIENT-LVL V: ICD-10-PCS | Mod: PBBFAC,HCNC,, | Performed by: INTERNAL MEDICINE

## 2020-09-14 PROCEDURE — 99499 RISK ADDL DX/OHS AUDIT: ICD-10-PCS | Mod: HCNC,S$GLB,, | Performed by: INTERNAL MEDICINE

## 2020-09-14 PROCEDURE — G0008 PR ADMIN INFLUENZA VIRUS VAC: ICD-10-PCS | Mod: HCNC,S$GLB,, | Performed by: INTERNAL MEDICINE

## 2020-09-14 PROCEDURE — 3074F PR MOST RECENT SYSTOLIC BLOOD PRESSURE < 130 MM HG: ICD-10-PCS | Mod: HCNC,CPTII,S$GLB, | Performed by: INTERNAL MEDICINE

## 2020-09-14 PROCEDURE — 99499 UNLISTED E&M SERVICE: CPT | Mod: HCNC,S$GLB,, | Performed by: INTERNAL MEDICINE

## 2020-09-14 PROCEDURE — 99213 OFFICE O/P EST LOW 20 MIN: CPT | Mod: 25,HCNC,S$GLB, | Performed by: INTERNAL MEDICINE

## 2020-09-14 PROCEDURE — 99999 PR PBB SHADOW E&M-EST. PATIENT-LVL V: CPT | Mod: PBBFAC,HCNC,, | Performed by: INTERNAL MEDICINE

## 2020-09-14 PROCEDURE — 1126F AMNT PAIN NOTED NONE PRSNT: CPT | Mod: HCNC,S$GLB,, | Performed by: INTERNAL MEDICINE

## 2020-09-14 PROCEDURE — 3074F SYST BP LT 130 MM HG: CPT | Mod: HCNC,CPTII,S$GLB, | Performed by: INTERNAL MEDICINE

## 2020-09-14 PROCEDURE — 3078F DIAST BP <80 MM HG: CPT | Mod: HCNC,CPTII,S$GLB, | Performed by: INTERNAL MEDICINE

## 2020-09-14 PROCEDURE — 1101F PT FALLS ASSESS-DOCD LE1/YR: CPT | Mod: HCNC,CPTII,S$GLB, | Performed by: INTERNAL MEDICINE

## 2020-09-14 PROCEDURE — 1159F MED LIST DOCD IN RCRD: CPT | Mod: HCNC,S$GLB,, | Performed by: INTERNAL MEDICINE

## 2020-09-14 RX ORDER — LANOLIN ALCOHOL/MO/W.PET/CERES
1 CREAM (GRAM) TOPICAL
COMMUNITY

## 2020-09-14 RX ORDER — NITROFURANTOIN 25; 75 MG/1; MG/1
CAPSULE ORAL
Status: ON HOLD | COMMUNITY
Start: 2020-06-10 | End: 2021-08-04 | Stop reason: HOSPADM

## 2020-09-14 RX ORDER — OXYCODONE HYDROCHLORIDE 15 MG/1
15 TABLET ORAL EVERY 4 HOURS PRN
COMMUNITY
Start: 2020-09-10

## 2020-09-14 NOTE — TELEPHONE ENCOUNTER
----- Message from Janie Cox sent at 9/14/2020  3:58 PM CDT -----  Regarding: Advice  Contact: Patient  Patient would like the nurse to give her a call back at Ph .[phone] (home) concerning medication.Patient does not have metoprolol succinate (TOPROL-XL) 25 MG 24 hr tablet, in her medication cabinet.

## 2020-09-14 NOTE — PROGRESS NOTES
Subjective:   Patient ID: Luda Jc is a 69 y.o. female.  Chief Complaint:  Follow-up (4 week--HTN)    Ms Jc is a 69 year old woman, presenting for 1 mo follow up for hypertension.  Last seen 8/2020.  PMH:  Hypertension - amlodipine 10mg, cholothalidone 50mg,   lisinopril 40mg BID. She is not sure if she is taking metoprolol. It is not on her medication lists that she brought with her, but she was suppose to restart it at last visit. She will check at home and call to let us know whether she is taking it.  BP is well controlled today. 120/66.    DM2/CKD3/Proteinuria - novolog 10u TID, lantus 34units AM/30uPM.  7/2020 labs - A1C 5.9% Microalbumin/Cr wnl.  GFR 32. Foot exam done at last visit . Eye exam due.   HLD - She thinks she is taking atorvastatin 80mg and fenofibrate 200mg daily.  LDL 44 (7/2020)\   Hx Stage II infiltrating ductal carcinoma - following up with oncology later this month.  On tamoxifen 20mg daily  Thrombocytopenia and anemia - followed by hematology. Last Platelet count (6/24/20) - 180.  Last H/H/MCV (6/24/20) - 10.1 / 30.4 / 84 . [facility] with Dr. Virgen.  GERD - pantoprazole  Allergic rhinitis - loratadine    HM:  Eye exam  MMG  DEXA  Shingles and Flu  Asa/Antiplatelet        Current Outpatient Medications:     ACCU-CHEK NICOLLE PLUS TEST STRP Strp, USE ONE STRIP TO CHECK GLUCOSE 2 TO 3 TIMES DAILY AS DIRECTED, Disp: 300 strip, Rfl: 3    ACCU-CHEK SOFT DEV LANCETS Kit, USE AS DIRECTED, Disp: 1 each, Rfl: 0    acitretin (SORIATANE) 10 MG capsule, Take 10 mg by mouth once daily., Disp: , Rfl: 3    amLODIPine (NORVASC) 10 MG tablet, Take 1 tablet by mouth once daily, Disp: 30 tablet, Rfl: 6    atorvastatin (LIPITOR) 80 MG tablet, Take 1 tablet by mouth once daily, Disp: 90 tablet, Rfl: 0    blood-glucose meter kit, Use as instructed, Disp: 1 each, Rfl: 0    calcium citrate-vitamin D3 315-200 mg (CITRACAL+D) 315 mg-5  "mcg (200 unit) per tablet, Take 1 tablet by mouth. Once a week, Disp: , Rfl:     chlorthalidone (HYGROTEN) 50 MG Tab, Take 1 tablet by mouth once daily, Disp: 30 tablet, Rfl: 3    fenofibrate micronized (LOFIBRA) 200 MG Cap, TAKE 1 CAPSULE BY MOUTH ONCE DAILY WITH BREAKFAST, Disp: 90 capsule, Rfl: 1    insulin (LANTUS SOLOSTAR U-100 INSULIN) glargine 100 units/mL (3mL) SubQ pen, INJECT 34 UNITS SUBCUTANEOUSLY IN THE MORNING AND 30 UNITS SUBCUTANEOUSLY IN THE EVENING, Disp: 30 mL, Rfl: 3    insulin aspart U-100 (NOVOLOG FLEXPEN U-100 INSULIN) 100 unit/mL (3 mL) InPn pen, INJECT 10 UNITS SUBCUTANEOUSLY THREE TIMES DAILY WITH MEALS, Disp: 15 mL, Rfl: 3    lancets (ACCU-CHEK SOFTCLIX LANCETS) Misc, USE AS DIRECTED TWICE DAILY, Disp: 200 each, Rfl: 3    lisinopriL (PRINIVIL,ZESTRIL) 40 MG tablet, Take 1 tablet (40 mg total) by mouth 2 (two) times daily., Disp: 180 tablet, Rfl: 2    loratadine (CLARITIN) 10 mg tablet, Take 10 mg by mouth once daily., Disp: , Rfl:     magnesium oxide (MAG-OX) 400 mg (241.3 mg magnesium) tablet, Take 1 tablet (400 mg total) by mouth 3 (three) times daily., Disp: 45 tablet, Rfl: 0    metFORMIN (GLUCOPHAGE) 1000 MG tablet, Take 0.5 tablets (500 mg total) by mouth 2 (two) times daily with meals., Disp: 180 tablet, Rfl: 1    metoprolol succinate (TOPROL-XL) 25 MG 24 hr tablet, Take 1 tablet (25 mg total) by mouth once daily., Disp: 30 tablet, Rfl: 1    oxyCODONE (ROXICODONE) 15 MG Tab, Take 15 mg by mouth every 4 (four) hours as needed., Disp: , Rfl:     oxyCODONE (ROXICODONE) 15 MG Tab, Take 15 mg by mouth every 4 (four) hours as needed., Disp: , Rfl:     pantoprazole (PROTONIX) 40 MG tablet, TAKE 1 TABLET BY MOUTH ONCE DAILY, Disp: 30 tablet, Rfl: 11    pen needle, diabetic (BD ULTRA-FINE FATEMEH PEN NEEDLES) 32 gauge x 5/32" Ndle, Use once daily. 250.00 IDDM, Disp: 100 each, Rfl: 3    tamoxifen (NOLVADEX) 20 MG Tab, Take 20 mg by mouth., Disp: , Rfl:     triamcinolone acetonide " "0.1% (KENALOG) 0.1 % cream, APPLY ONE APPLICATION ON THE SKIN TWICE DAILY, Disp: , Rfl: 0    doxepin (SINEQUAN) 25 MG capsule, , Disp: , Rfl:     nitrofurantoin, macrocrystal-monohydrate, (MACROBID) 100 MG capsule, TAKE 1 CAPSULE BY MOUTH TWICE DAILY WITH FOOD FOR 5 DAYS, Disp: , Rfl:     ondansetron (ZOFRAN) 8 MG tablet, Take 8 mg by mouth every 8 (eight) hours as needed., Disp: , Rfl:     Review of Systems   Constitutional: Negative for fatigue, fever and unexpected weight change.   HENT: Negative for congestion, postnasal drip, rhinorrhea, sinus pain and sore throat.    Eyes: Negative for visual disturbance.   Respiratory: Negative for cough, chest tightness and shortness of breath.    Cardiovascular: Negative for chest pain, palpitations and leg swelling.   Gastrointestinal: Negative for abdominal pain, constipation and diarrhea.   Endocrine: Negative for polydipsia, polyphagia and polyuria.   Genitourinary: Negative for dysuria, frequency and urgency.   Musculoskeletal: Negative for arthralgias, back pain and myalgias.   Skin: Negative for rash.   Neurological: Negative for dizziness, weakness, light-headedness, numbness and headaches.   Psychiatric/Behavioral: Negative for agitation, decreased concentration, dysphoric mood and sleep disturbance. The patient is not nervous/anxious.      Objective:   /66   Pulse 102   Temp 98.8 °F (37.1 °C) (Oral)   Ht 4' 11" (1.499 m)   Wt 57.3 kg (126 lb 5.2 oz)   SpO2 97%   BMI 25.51 kg/m²   /66    Physical Exam  Vitals signs and nursing note reviewed.   Constitutional:       Appearance: Normal appearance.   Cardiovascular:      Rate and Rhythm: Normal rate and regular rhythm.      Pulses: Normal pulses.      Heart sounds: Normal heart sounds. No murmur. No friction rub.   Pulmonary:      Effort: Pulmonary effort is normal. No respiratory distress.      Breath sounds: Normal breath sounds.   Chest:      Chest wall: No tenderness.   Abdominal:      " General: Bowel sounds are normal. There is no distension.      Palpations: Abdomen is soft. There is no mass.      Tenderness: There is no abdominal tenderness.   Skin:     Findings: Bruising present. No rash.      Comments: echymoses   Neurological:      Mental Status: She is alert.       Assessment:       ICD-10-CM ICD-9-CM   1. Hypertension goal BP (blood pressure) < 130/80  I10 401.9   2. Atherosclerosis of aorta  I70.0 440.0   3. Ductal carcinoma of breast, unspecified laterality  C50.919 174.9   4. Immunization due  Z23 V05.9     Plan:   Hypertension goal BP (blood pressure) < 130/80  BP well controlled on current medications.  She is not sure what medications she is taking - but she will call us with an updated list.   Should be on: amlodipine 10mg, cholothalidone 50mg,   lisinopril 40mg BID, metoprolol 25mg daily.    Atherosclerosis of aorta  Hyperlipidemia  Lipid panel done 7/2020 showed LDL 44, .  Continue with atorvastatin 80mg and fenofibrate 200mg.     Ductal carcinoma of breast, unspecified laterality  Following up with H/O later this month. Patient had B/L mastectomy 6 months ago, taking tamoxifen 20mg daily.    Immunization due  Other orders  -     Influenza - Quadrivalent (Adjuvanted)    Thalia Nguyen, MS4  [employer]  09/14/2020   5:31 PM

## 2020-09-14 NOTE — PROGRESS NOTES
Subjective:       Patient ID: Luda Jc is a 69 y.o. female.    Chief Complaint: Follow-up (4 week--HTN)    Luda Jc  69 y.o. White female    Patient presents with:  Follow-up: 4 week--HTN    HPI:Presents to the clinic for a 4 week follow up on hypertension. Her b/p is better. She denies symptoms. She is taking amlodipine, chlorthalidone and lisinopril but she is not sure about metoprolol.   Breast cancer--management per oncology.   Aortic atherosclerosis--seen on prior imaging. She denies claudication symptoms.     Past Medical History:  10/11/2014: Abnormal LFTs (liver function tests)  Arthritis  Chronic kidney disease  Diabetes mellitus, type 2  Encounter for blood transfusion  Fatty liver      Comment:  CT Abdomen/pelvis 6/24/2015---Fatty infiltration of the                liver.  Hyperlipidemia  Hypertension  10/11/2014: Insulin long-term use  Nephrolithiasis      Comment:  CT Abdomen/pelvis 6/24/2015---right nephrolithiasis.  Obesity  06/16/2014: Stage II infiltrating ductal carcinoma of breast      Comment:  Followed by Dr. Virgen/Dr. DAVEY Salinas    Current Outpatient Medications on File Prior to Visit:  ACCU-CHEK NICOLLE PLUS TEST STRP Strp, USE ONE STRIP TO CHECK GLUCOSE 2 TO 3 TIMES DAILY AS DIRECTED, Disp: 300 strip, Rfl: 3  ACCU-CHEK SOFT DEV LANCETS Kit, USE AS DIRECTED, Disp: 1 each, Rfl: 0  acitretin (SORIATANE) 10 MG capsule, Take 10 mg by mouth once daily., Disp: , Rfl: 3  amLODIPine (NORVASC) 10 MG tablet, Take 1 tablet by mouth once daily, Disp: 30 tablet, Rfl: 6  atorvastatin (LIPITOR) 80 MG tablet, Take 1 tablet by mouth once daily, Disp: 90 tablet, Rfl: 0  blood-glucose meter kit, Use as instructed, Disp: 1 each, Rfl: 0  calcium citrate-vitamin D3 315-200 mg (CITRACAL+D) 315 mg-5 mcg (200 unit) per tablet, Take 1 tablet by mouth. Once a week, Disp: , Rfl:   chlorthalidone (HYGROTEN) 50 MG Tab, Take 1 tablet by mouth once daily, Disp: 30 tablet, Rfl: 3  fenofibrate micronized (LOFIBRA) 200  "MG Cap, TAKE 1 CAPSULE BY MOUTH ONCE DAILY WITH BREAKFAST, Disp: 90 capsule, Rfl: 1  insulin (LANTUS SOLOSTAR U-100 INSULIN) glargine 100 units/mL (3mL) SubQ pen, INJECT 34 UNITS SUBCUTANEOUSLY IN THE MORNING AND 30 UNITS SUBCUTANEOUSLY IN THE EVENING, Disp: 30 mL, Rfl: 3  insulin aspart U-100 (NOVOLOG FLEXPEN U-100 INSULIN) 100 unit/mL (3 mL) InPn pen, INJECT 10 UNITS SUBCUTANEOUSLY THREE TIMES DAILY WITH MEALS, Disp: 15 mL, Rfl: 3  lancets (ACCU-CHEK SOFTCLIX LANCETS) Misc, USE AS DIRECTED TWICE DAILY, Disp: 200 each, Rfl: 3  lisinopriL (PRINIVIL,ZESTRIL) 40 MG tablet, Take 1 tablet (40 mg total) by mouth 2 (two) times daily., Disp: 180 tablet, Rfl: 2  loratadine (CLARITIN) 10 mg tablet, Take 10 mg by mouth once daily., Disp: , Rfl:   magnesium oxide (MAG-OX) 400 mg (241.3 mg magnesium) tablet, Take 1 tablet (400 mg total) by mouth 3 (three) times daily., Disp: 45 tablet, Rfl: 0  metFORMIN (GLUCOPHAGE) 1000 MG tablet, Take 0.5 tablets (500 mg total) by mouth 2 (two) times daily with meals., Disp: 180 tablet, Rfl: 1  metoprolol succinate (TOPROL-XL) 25 MG 24 hr tablet, Take 1 tablet (25 mg total) by mouth once daily., Disp: 30 tablet, Rfl: 1  oxyCODONE (ROXICODONE) 15 MG Tab, Take 15 mg by mouth every 4 (four) hours as needed., Disp: , Rfl:   oxyCODONE (ROXICODONE) 15 MG Tab, Take 15 mg by mouth every 4 (four) hours as needed., Disp: , Rfl:   pantoprazole (PROTONIX) 40 MG tablet, TAKE 1 TABLET BY MOUTH ONCE DAILY, Disp: 30 tablet, Rfl: 11  pen needle, diabetic (BD ULTRA-FINE FATEMEH PEN NEEDLES) 32 gauge x 5/32" Ndle, Use once daily. 250.00 IDDM, Disp: 100 each, Rfl: 3  tamoxifen (NOLVADEX) 20 MG Tab, Take 20 mg by mouth., Disp: , Rfl:   triamcinolone acetonide 0.1% (KENALOG) 0.1 % cream, APPLY ONE APPLICATION ON THE SKIN TWICE DAILY, Disp: , Rfl: 0  doxepin (SINEQUAN) 25 MG capsule, , Disp: , Rfl:   nitrofurantoin, macrocrystal-monohydrate, (MACROBID) 100 MG capsule, TAKE 1 CAPSULE BY MOUTH TWICE DAILY WITH FOOD FOR " 5 DAYS, Disp: , Rfl:   ondansetron (ZOFRAN) 8 MG tablet, Take 8 mg by mouth every 8 (eight) hours as needed., Disp: , Rfl:     Allergies:  Review of patient's allergies indicates:  No Known Allergies      Review of Systems   Respiratory: Negative for shortness of breath.    Cardiovascular: Negative for chest pain, leg swelling and claudication.   Neurological: Negative for dizziness, numbness and headaches.         Objective:      Physical Exam  Constitutional:       General: She is not in acute distress.     Appearance: She is well-developed. She is not ill-appearing.   Eyes:      General: No scleral icterus.  Pulmonary:      Effort: Pulmonary effort is normal. No respiratory distress.   Musculoskeletal:      Right lower leg: No edema.      Left lower leg: No edema.   Neurological:      Mental Status: She is alert and oriented to person, place, and time.   Psychiatric:         Behavior: Behavior normal.         Thought Content: Thought content normal.         Judgment: Judgment normal.         Assessment:       1. Hypertension goal BP (blood pressure) < 130/80    2. Atherosclerosis of aorta    3. Ductal carcinoma of breast, unspecified laterality    4. Immunization due        Plan:       Luda was seen today for follow-up.    Diagnoses and all orders for this visit:    Hypertension goal BP (blood pressure) < 130/80  -     Continue current management  -     She will call the office to let us know if she is taking metoprolol    Atherosclerosis of aorta  -     Monitor    Ductal carcinoma of breast, unspecified laterality  -     F/U with oncology    Immunization due  -     Recommend influenza vaccine    F/U in 6 months and as needed.

## 2020-09-14 NOTE — PATIENT INSTRUCTIONS
Call the office with a list of all of your medications.   I need to know specifically if you are taking metoprolol.

## 2020-09-15 RX ORDER — METOPROLOL SUCCINATE 25 MG/1
25 TABLET, EXTENDED RELEASE ORAL DAILY
Qty: 30 TABLET | Refills: 3 | Status: SHIPPED | OUTPATIENT
Start: 2020-09-15 | End: 2021-03-23

## 2020-09-29 ENCOUNTER — PATIENT MESSAGE (OUTPATIENT)
Dept: OTHER | Facility: OTHER | Age: 70
End: 2020-09-29

## 2020-10-06 ENCOUNTER — PATIENT MESSAGE (OUTPATIENT)
Dept: ADMINISTRATIVE | Facility: HOSPITAL | Age: 70
End: 2020-10-06

## 2020-10-28 ENCOUNTER — PATIENT OUTREACH (OUTPATIENT)
Dept: ADMINISTRATIVE | Facility: HOSPITAL | Age: 70
End: 2020-10-28

## 2020-10-28 NOTE — LETTER
October 28, 2020     Sunshine Mitchell MD             [facility]  [address]  [facility] [address]  Phone: [phone] October 28, 2020     Patient: Luda Jc    YOB: 1950   Date of Visit: 10/28/2020       To whom it may concern     We are seeing Luda GOMEZ JcGERONIMOO.B is 1950, at [facility]. Leanne Bonilla DO is their primary care physician. To help with our [place] maintenance records could you please send the following:     Diabetic Eye Exam done after 2018 that states Positive or Negative Retinopathy.    Please send fax to [phone] Attention Mable CASTILLO LPN Care Coordination.    Thank-you in advance for your assistance. If you have any questions or concerns, please don't hesitate to contact me at [phone].     Mable CASTILLO LPN  Care Coordination Department  [facility]  Phone number [phone]

## 2020-10-28 NOTE — PROGRESS NOTES
LESVIA Leach Diabetic Eye Report: Attempted to reach out to Pt about overdue DM Eye Exam. Pt did not answer left voicemail with callback number.

## 2020-11-10 ENCOUNTER — PATIENT OUTREACH (OUTPATIENT)
Dept: ADMINISTRATIVE | Facility: HOSPITAL | Age: 70
End: 2020-11-10

## 2020-11-24 ENCOUNTER — TELEPHONE (OUTPATIENT)
Dept: ADMINISTRATIVE | Facility: HOSPITAL | Age: 70
End: 2020-11-24

## 2020-12-11 ENCOUNTER — PATIENT MESSAGE (OUTPATIENT)
Dept: OTHER | Facility: OTHER | Age: 70
End: 2020-12-11

## 2020-12-11 ENCOUNTER — PES CALL (OUTPATIENT)
Dept: ADMINISTRATIVE | Facility: CLINIC | Age: 70
End: 2020-12-11

## 2020-12-23 ENCOUNTER — TELEPHONE (OUTPATIENT)
Dept: INTERNAL MEDICINE | Facility: CLINIC | Age: 70
End: 2020-12-23

## 2020-12-23 NOTE — TELEPHONE ENCOUNTER
----- Message from Becky Ann sent at 12/23/2020  4:52 PM CST -----  Regarding: RETURN CALL  Type:  Patient Returning Call    Who Called:PT  Who Left Message for Patient:NURSE  Does the patient know what this is regarding?:YES  Would the patient rather a call back or a response via [facility]? CALL BACK  Best Call Back Number:[phone]   Additional Information:

## 2021-02-04 ENCOUNTER — PES CALL (OUTPATIENT)
Dept: ADMINISTRATIVE | Facility: CLINIC | Age: 71
End: 2021-02-04

## 2021-02-28 DIAGNOSIS — E78.2 HYPERLIPIDEMIA, MIXED: Primary | ICD-10-CM

## 2021-03-02 ENCOUNTER — TELEPHONE (OUTPATIENT)
Dept: ADMINISTRATIVE | Facility: HOSPITAL | Age: 71
End: 2021-03-02

## 2021-03-02 RX ORDER — ATORVASTATIN CALCIUM 80 MG/1
TABLET, FILM COATED ORAL
Qty: 90 TABLET | Refills: 1 | Status: ON HOLD | OUTPATIENT
Start: 2021-03-02 | End: 2021-08-04 | Stop reason: HOSPADM

## 2021-03-08 ENCOUNTER — TELEPHONE (OUTPATIENT)
Dept: INTERNAL MEDICINE | Facility: CLINIC | Age: 71
End: 2021-03-08

## 2021-03-20 DIAGNOSIS — I10 HYPERTENSION GOAL BP (BLOOD PRESSURE) < 130/80: ICD-10-CM

## 2021-03-25 RX ORDER — METOPROLOL SUCCINATE 25 MG/1
TABLET, EXTENDED RELEASE ORAL
Qty: 90 TABLET | Refills: 1 | Status: SHIPPED | OUTPATIENT
Start: 2021-03-25

## 2021-04-09 ENCOUNTER — TELEPHONE (OUTPATIENT)
Dept: ADMINISTRATIVE | Facility: HOSPITAL | Age: 71
End: 2021-04-09

## 2021-04-18 DIAGNOSIS — E11.29 DIABETES MELLITUS WITH PROTEINURIA: ICD-10-CM

## 2021-04-18 DIAGNOSIS — R80.9 DIABETES MELLITUS WITH PROTEINURIA: ICD-10-CM

## 2021-04-18 DIAGNOSIS — I10 HYPERTENSION GOAL BP (BLOOD PRESSURE) < 130/80: ICD-10-CM

## 2021-04-18 DIAGNOSIS — Z79.4 TYPE 2 DIABETES MELLITUS WITH STAGE 3 CHRONIC KIDNEY DISEASE, WITH LONG-TERM CURRENT USE OF INSULIN: ICD-10-CM

## 2021-04-18 DIAGNOSIS — N18.30 TYPE 2 DIABETES MELLITUS WITH STAGE 3 CHRONIC KIDNEY DISEASE, WITH LONG-TERM CURRENT USE OF INSULIN: ICD-10-CM

## 2021-04-18 DIAGNOSIS — E11.22 TYPE 2 DIABETES MELLITUS WITH STAGE 3 CHRONIC KIDNEY DISEASE, WITH LONG-TERM CURRENT USE OF INSULIN: ICD-10-CM

## 2021-04-22 RX ORDER — LISINOPRIL 40 MG/1
TABLET ORAL
Qty: 180 TABLET | Refills: 0 | Status: SHIPPED | OUTPATIENT
Start: 2021-04-22 | End: 2021-07-30

## 2021-05-04 RX ORDER — PANTOPRAZOLE SODIUM 40 MG/1
TABLET, DELAYED RELEASE ORAL
Qty: 90 TABLET | Refills: 1 | Status: SHIPPED | OUTPATIENT
Start: 2021-05-04

## 2021-05-06 NOTE — Clinical Note
Add CBC to next scheduled labs.  [None] : None [Good understanding] : Patient has a good understanding of lifestyle changes and steps needed to achieve self management goal

## 2021-05-18 RX ORDER — CHLORTHALIDONE 50 MG/1
TABLET ORAL
Qty: 90 TABLET | Refills: 0 | Status: ON HOLD | OUTPATIENT
Start: 2021-05-18 | End: 2021-08-04 | Stop reason: HOSPADM

## 2021-05-25 ENCOUNTER — LAB VISIT (OUTPATIENT)
Dept: LAB | Facility: HOSPITAL | Age: 71
End: 2021-05-25
Attending: INTERNAL MEDICINE
Payer: MEDICARE

## 2021-05-25 DIAGNOSIS — I10 HYPERTENSION GOAL BP (BLOOD PRESSURE) < 130/80: ICD-10-CM

## 2021-05-25 DIAGNOSIS — E11.29 DIABETES MELLITUS WITH PROTEINURIA: ICD-10-CM

## 2021-05-25 DIAGNOSIS — E78.5 HYPERLIPIDEMIA LDL GOAL <70: ICD-10-CM

## 2021-05-25 DIAGNOSIS — E11.22 TYPE 2 DIABETES MELLITUS WITH STAGE 3 CHRONIC KIDNEY DISEASE, WITH LONG-TERM CURRENT USE OF INSULIN: ICD-10-CM

## 2021-05-25 DIAGNOSIS — R80.9 DIABETES MELLITUS WITH PROTEINURIA: ICD-10-CM

## 2021-05-25 DIAGNOSIS — E78.2 HYPERLIPIDEMIA, MIXED: ICD-10-CM

## 2021-05-25 DIAGNOSIS — N18.30 TYPE 2 DIABETES MELLITUS WITH STAGE 3 CHRONIC KIDNEY DISEASE, WITH LONG-TERM CURRENT USE OF INSULIN: ICD-10-CM

## 2021-05-25 DIAGNOSIS — Z79.4 TYPE 2 DIABETES MELLITUS WITH STAGE 3 CHRONIC KIDNEY DISEASE, WITH LONG-TERM CURRENT USE OF INSULIN: ICD-10-CM

## 2021-05-25 LAB
ALBUMIN SERPL BCP-MCNC: 3.8 G/DL (ref 3.5–5.2)
ALP SERPL-CCNC: 30 U/L (ref 55–135)
ALT SERPL W/O P-5'-P-CCNC: 12 U/L (ref 10–44)
ANION GAP SERPL CALC-SCNC: 9 MMOL/L (ref 8–16)
AST SERPL-CCNC: 37 U/L (ref 10–40)
BILIRUB SERPL-MCNC: 0.5 MG/DL (ref 0.1–1)
BUN SERPL-MCNC: 24 MG/DL (ref 8–23)
CALCIUM SERPL-MCNC: 8.3 MG/DL (ref 8.7–10.5)
CHLORIDE SERPL-SCNC: 109 MMOL/L (ref 95–110)
CHOLEST SERPL-MCNC: 115 MG/DL (ref 120–199)
CHOLEST/HDLC SERPL: 4.3 {RATIO} (ref 2–5)
CO2 SERPL-SCNC: 23 MMOL/L (ref 23–29)
CREAT SERPL-MCNC: 1.6 MG/DL (ref 0.5–1.4)
ERYTHROCYTE [DISTWIDTH] IN BLOOD BY AUTOMATED COUNT: 16.1 % (ref 11.5–14.5)
EST. GFR  (AFRICAN AMERICAN): 37.1 ML/MIN/1.73 M^2
EST. GFR  (NON AFRICAN AMERICAN): 32.2 ML/MIN/1.73 M^2
ESTIMATED AVG GLUCOSE: 143 MG/DL (ref 68–131)
GLUCOSE SERPL-MCNC: 74 MG/DL (ref 70–110)
HBA1C MFR BLD: 6.6 % (ref 4–5.6)
HCT VFR BLD AUTO: 27.5 % (ref 37–48.5)
HDLC SERPL-MCNC: 27 MG/DL (ref 40–75)
HDLC SERPL: 23.5 % (ref 20–50)
HGB BLD-MCNC: 8.3 G/DL (ref 12–16)
LDLC SERPL CALC-MCNC: 50.4 MG/DL (ref 63–159)
MCH RBC QN AUTO: 26.7 PG (ref 27–31)
MCHC RBC AUTO-ENTMCNC: 30.2 G/DL (ref 32–36)
MCV RBC AUTO: 88 FL (ref 82–98)
NONHDLC SERPL-MCNC: 88 MG/DL
PLATELET # BLD AUTO: 138 K/UL (ref 150–450)
PMV BLD AUTO: 11.5 FL (ref 9.2–12.9)
POTASSIUM SERPL-SCNC: 4 MMOL/L (ref 3.5–5.1)
PROT SERPL-MCNC: 6.3 G/DL (ref 6–8.4)
RBC # BLD AUTO: 3.11 M/UL (ref 4–5.4)
SODIUM SERPL-SCNC: 141 MMOL/L (ref 136–145)
TRIGL SERPL-MCNC: 188 MG/DL (ref 30–150)
WBC # BLD AUTO: 5.93 K/UL (ref 3.9–12.7)

## 2021-05-25 PROCEDURE — 80061 LIPID PANEL: CPT | Performed by: INTERNAL MEDICINE

## 2021-05-25 PROCEDURE — 85027 COMPLETE CBC AUTOMATED: CPT | Performed by: INTERNAL MEDICINE

## 2021-05-25 PROCEDURE — 36415 COLL VENOUS BLD VENIPUNCTURE: CPT | Performed by: INTERNAL MEDICINE

## 2021-05-25 PROCEDURE — 80053 COMPREHEN METABOLIC PANEL: CPT | Performed by: INTERNAL MEDICINE

## 2021-05-25 PROCEDURE — 83036 HEMOGLOBIN GLYCOSYLATED A1C: CPT | Performed by: INTERNAL MEDICINE

## 2021-06-01 LAB
LEFT EYE DM RETINOPATHY: NEGATIVE
RIGHT EYE DM RETINOPATHY: NEGATIVE

## 2021-06-02 ENCOUNTER — TELEPHONE (OUTPATIENT)
Dept: ADMINISTRATIVE | Facility: HOSPITAL | Age: 71
End: 2021-06-02

## 2021-06-08 RX ORDER — AMLODIPINE BESYLATE 10 MG/1
10 TABLET ORAL DAILY
Qty: 90 TABLET | Refills: 0 | Status: ON HOLD | OUTPATIENT
Start: 2021-06-08 | End: 2021-08-04 | Stop reason: HOSPADM

## 2021-06-09 ENCOUNTER — PATIENT OUTREACH (OUTPATIENT)
Dept: ADMINISTRATIVE | Facility: HOSPITAL | Age: 71
End: 2021-06-09

## 2021-06-14 ENCOUNTER — TELEPHONE (OUTPATIENT)
Dept: ADMINISTRATIVE | Facility: HOSPITAL | Age: 71
End: 2021-06-14

## 2021-07-26 ENCOUNTER — TELEPHONE (OUTPATIENT)
Dept: INTERNAL MEDICINE | Facility: CLINIC | Age: 71
End: 2021-07-26

## 2021-07-26 ENCOUNTER — TELEPHONE (OUTPATIENT)
Dept: ADMINISTRATIVE | Facility: HOSPITAL | Age: 71
End: 2021-07-26

## 2021-07-29 DIAGNOSIS — I10 HYPERTENSION GOAL BP (BLOOD PRESSURE) < 130/80: ICD-10-CM

## 2021-07-29 DIAGNOSIS — E11.22 TYPE 2 DIABETES MELLITUS WITH STAGE 3 CHRONIC KIDNEY DISEASE, WITH LONG-TERM CURRENT USE OF INSULIN: ICD-10-CM

## 2021-07-29 DIAGNOSIS — Z79.4 TYPE 2 DIABETES MELLITUS WITH STAGE 3 CHRONIC KIDNEY DISEASE, WITH LONG-TERM CURRENT USE OF INSULIN: ICD-10-CM

## 2021-07-29 DIAGNOSIS — R80.9 DIABETES MELLITUS WITH PROTEINURIA: ICD-10-CM

## 2021-07-29 DIAGNOSIS — M79.18 MUSCULOSKELETAL PAIN: ICD-10-CM

## 2021-07-29 DIAGNOSIS — E11.29 DIABETES MELLITUS WITH PROTEINURIA: ICD-10-CM

## 2021-07-29 DIAGNOSIS — N18.30 TYPE 2 DIABETES MELLITUS WITH STAGE 3 CHRONIC KIDNEY DISEASE, WITH LONG-TERM CURRENT USE OF INSULIN: ICD-10-CM

## 2021-07-30 DIAGNOSIS — E11.29 DIABETES MELLITUS WITH PROTEINURIA: ICD-10-CM

## 2021-07-30 DIAGNOSIS — N18.30 TYPE 2 DIABETES MELLITUS WITH STAGE 3 CHRONIC KIDNEY DISEASE, WITH LONG-TERM CURRENT USE OF INSULIN: ICD-10-CM

## 2021-07-30 DIAGNOSIS — I10 HYPERTENSION GOAL BP (BLOOD PRESSURE) < 130/80: ICD-10-CM

## 2021-07-30 DIAGNOSIS — E11.22 TYPE 2 DIABETES MELLITUS WITH STAGE 3 CHRONIC KIDNEY DISEASE, WITH LONG-TERM CURRENT USE OF INSULIN: ICD-10-CM

## 2021-07-30 DIAGNOSIS — Z79.4 TYPE 2 DIABETES MELLITUS WITH STAGE 3 CHRONIC KIDNEY DISEASE, WITH LONG-TERM CURRENT USE OF INSULIN: ICD-10-CM

## 2021-07-30 DIAGNOSIS — R80.9 DIABETES MELLITUS WITH PROTEINURIA: ICD-10-CM

## 2021-07-30 RX ORDER — LISINOPRIL 40 MG/1
TABLET ORAL
Qty: 180 TABLET | Refills: 0 | Status: ON HOLD | OUTPATIENT
Start: 2021-07-30 | End: 2021-08-04 | Stop reason: HOSPADM

## 2021-07-30 RX ORDER — CYCLOBENZAPRINE HCL 5 MG
TABLET ORAL
Qty: 30 TABLET | Refills: 0 | OUTPATIENT
Start: 2021-07-30

## 2021-08-01 RX ORDER — LISINOPRIL 40 MG/1
TABLET ORAL
Qty: 180 TABLET | Refills: 0 | OUTPATIENT
Start: 2021-08-01

## 2021-08-02 ENCOUNTER — HOSPITAL ENCOUNTER (INPATIENT)
Facility: HOSPITAL | Age: 71
LOS: 2 days | Discharge: HOSPICE | DRG: 683 | End: 2021-08-04
Attending: EMERGENCY MEDICINE | Admitting: INTERNAL MEDICINE
Payer: MEDICARE

## 2021-08-02 DIAGNOSIS — E87.20 METABOLIC ACIDOSIS: ICD-10-CM

## 2021-08-02 DIAGNOSIS — R19.7 DIARRHEA, UNSPECIFIED TYPE: ICD-10-CM

## 2021-08-02 DIAGNOSIS — R17 JAUNDICE: ICD-10-CM

## 2021-08-02 DIAGNOSIS — N14.0 ANALGESIC NEPHROPATHY: ICD-10-CM

## 2021-08-02 DIAGNOSIS — C79.9 METASTATIC MALIGNANT NEOPLASM, UNSPECIFIED SITE: ICD-10-CM

## 2021-08-02 DIAGNOSIS — N17.9 AKI (ACUTE KIDNEY INJURY): ICD-10-CM

## 2021-08-02 DIAGNOSIS — E87.5 HYPERKALEMIA: ICD-10-CM

## 2021-08-02 DIAGNOSIS — C50.919 METASTATIC BREAST CANCER: ICD-10-CM

## 2021-08-02 DIAGNOSIS — N17.9 ACUTE RENAL FAILURE, UNSPECIFIED ACUTE RENAL FAILURE TYPE: Primary | ICD-10-CM

## 2021-08-02 DIAGNOSIS — Z51.5 ENCOUNTER FOR PALLIATIVE CARE: ICD-10-CM

## 2021-08-02 DIAGNOSIS — R07.9 CHEST PAIN: ICD-10-CM

## 2021-08-02 DIAGNOSIS — N18.30 STAGE 3 CHRONIC KIDNEY DISEASE, UNSPECIFIED WHETHER STAGE 3A OR 3B CKD: ICD-10-CM

## 2021-08-02 LAB
ALBUMIN SERPL BCP-MCNC: 3.1 G/DL (ref 3.5–5.2)
ALP SERPL-CCNC: 322 U/L (ref 55–135)
ALT SERPL W/O P-5'-P-CCNC: 41 U/L (ref 10–44)
ANION GAP SERPL CALC-SCNC: 27 MMOL/L (ref 8–16)
AST SERPL-CCNC: 381 U/L (ref 10–40)
BASOPHILS # BLD AUTO: 0.04 K/UL (ref 0–0.2)
BASOPHILS NFR BLD: 0.4 % (ref 0–1.9)
BILIRUB SERPL-MCNC: 4.6 MG/DL (ref 0.1–1)
BNP SERPL-MCNC: 115 PG/ML (ref 0–99)
BUN SERPL-MCNC: 118 MG/DL (ref 8–23)
CALCIUM SERPL-MCNC: 8.8 MG/DL (ref 8.7–10.5)
CHLORIDE SERPL-SCNC: 94 MMOL/L (ref 95–110)
CO2 SERPL-SCNC: 12 MMOL/L (ref 23–29)
CREAT SERPL-MCNC: 6.7 MG/DL (ref 0.5–1.4)
DIFFERENTIAL METHOD: ABNORMAL
EOSINOPHIL # BLD AUTO: 0 K/UL (ref 0–0.5)
EOSINOPHIL NFR BLD: 0.2 % (ref 0–8)
ERYTHROCYTE [DISTWIDTH] IN BLOOD BY AUTOMATED COUNT: 19.9 % (ref 11.5–14.5)
EST. GFR  (AFRICAN AMERICAN): 7 ML/MIN/1.73 M^2
EST. GFR  (NON AFRICAN AMERICAN): 6 ML/MIN/1.73 M^2
GLUCOSE SERPL-MCNC: 91 MG/DL (ref 70–110)
HCT VFR BLD AUTO: 27.2 % (ref 37–48.5)
HGB BLD-MCNC: 8.3 G/DL (ref 12–16)
IMM GRANULOCYTES # BLD AUTO: 0.41 K/UL (ref 0–0.04)
IMM GRANULOCYTES NFR BLD AUTO: 4.3 % (ref 0–0.5)
LYMPHOCYTES # BLD AUTO: 0.8 K/UL (ref 1–4.8)
LYMPHOCYTES NFR BLD: 8.1 % (ref 18–48)
MAGNESIUM SERPL-MCNC: 2.9 MG/DL (ref 1.6–2.6)
MCH RBC QN AUTO: 26.6 PG (ref 27–31)
MCHC RBC AUTO-ENTMCNC: 30.5 G/DL (ref 32–36)
MCV RBC AUTO: 87 FL (ref 82–98)
MONOCYTES # BLD AUTO: 0.6 K/UL (ref 0.3–1)
MONOCYTES NFR BLD: 6.5 % (ref 4–15)
NEUTROPHILS # BLD AUTO: 7.7 K/UL (ref 1.8–7.7)
NEUTROPHILS NFR BLD: 80.5 % (ref 38–73)
NRBC BLD-RTO: 0 /100 WBC
PHOSPHATE SERPL-MCNC: 8.2 MG/DL (ref 2.7–4.5)
PLATELET # BLD AUTO: 268 K/UL (ref 150–450)
PMV BLD AUTO: 9.8 FL (ref 9.2–12.9)
POTASSIUM SERPL-SCNC: 6.1 MMOL/L (ref 3.5–5.1)
PROT SERPL-MCNC: 7.1 G/DL (ref 6–8.4)
RBC # BLD AUTO: 3.12 M/UL (ref 4–5.4)
SARS-COV-2 RDRP RESP QL NAA+PROBE: NEGATIVE
SODIUM SERPL-SCNC: 133 MMOL/L (ref 136–145)
TROPONIN I SERPL DL<=0.01 NG/ML-MCNC: 0.04 NG/ML (ref 0–0.03)
WBC # BLD AUTO: 9.55 K/UL (ref 3.9–12.7)

## 2021-08-02 PROCEDURE — 99223 1ST HOSP IP/OBS HIGH 75: CPT | Mod: ,,, | Performed by: INTERNAL MEDICINE

## 2021-08-02 PROCEDURE — 83880 ASSAY OF NATRIURETIC PEPTIDE: CPT | Performed by: PHYSICIAN ASSISTANT

## 2021-08-02 PROCEDURE — 25000003 PHARM REV CODE 250: Performed by: EMERGENCY MEDICINE

## 2021-08-02 PROCEDURE — 85025 COMPLETE CBC W/AUTO DIFF WBC: CPT | Performed by: PHYSICIAN ASSISTANT

## 2021-08-02 PROCEDURE — 11000001 HC ACUTE MED/SURG PRIVATE ROOM

## 2021-08-02 PROCEDURE — 93010 ELECTROCARDIOGRAM REPORT: CPT | Mod: ,,, | Performed by: INTERNAL MEDICINE

## 2021-08-02 PROCEDURE — 63600175 PHARM REV CODE 636 W HCPCS: Performed by: NURSE PRACTITIONER

## 2021-08-02 PROCEDURE — 93005 ELECTROCARDIOGRAM TRACING: CPT

## 2021-08-02 PROCEDURE — 93010 EKG 12-LEAD: ICD-10-PCS | Mod: ,,, | Performed by: INTERNAL MEDICINE

## 2021-08-02 PROCEDURE — 25000003 PHARM REV CODE 250: Performed by: INTERNAL MEDICINE

## 2021-08-02 PROCEDURE — 83735 ASSAY OF MAGNESIUM: CPT | Performed by: PHYSICIAN ASSISTANT

## 2021-08-02 PROCEDURE — 84100 ASSAY OF PHOSPHORUS: CPT | Performed by: PHYSICIAN ASSISTANT

## 2021-08-02 PROCEDURE — U0002 COVID-19 LAB TEST NON-CDC: HCPCS | Performed by: EMERGENCY MEDICINE

## 2021-08-02 PROCEDURE — 99285 EMERGENCY DEPT VISIT HI MDM: CPT

## 2021-08-02 PROCEDURE — 80053 COMPREHEN METABOLIC PANEL: CPT | Performed by: PHYSICIAN ASSISTANT

## 2021-08-02 PROCEDURE — 84484 ASSAY OF TROPONIN QUANT: CPT | Performed by: PHYSICIAN ASSISTANT

## 2021-08-02 PROCEDURE — 99223 PR INITIAL HOSPITAL CARE,LEVL III: ICD-10-PCS | Mod: ,,, | Performed by: INTERNAL MEDICINE

## 2021-08-02 RX ORDER — HYDROCODONE BITARTRATE AND ACETAMINOPHEN 5; 325 MG/1; MG/1
1 TABLET ORAL EVERY 6 HOURS PRN
Status: DISCONTINUED | OUTPATIENT
Start: 2021-08-02 | End: 2021-08-03

## 2021-08-02 RX ORDER — AMLODIPINE BESYLATE 10 MG/1
10 TABLET ORAL DAILY
Status: DISCONTINUED | OUTPATIENT
Start: 2021-08-03 | End: 2021-08-03

## 2021-08-02 RX ORDER — IBUPROFEN 200 MG
24 TABLET ORAL
Status: DISCONTINUED | OUTPATIENT
Start: 2021-08-02 | End: 2021-08-04 | Stop reason: HOSPADM

## 2021-08-02 RX ORDER — IBUPROFEN 200 MG
16 TABLET ORAL
Status: DISCONTINUED | OUTPATIENT
Start: 2021-08-02 | End: 2021-08-04 | Stop reason: HOSPADM

## 2021-08-02 RX ORDER — CHLORTHALIDONE 25 MG/1
50 TABLET ORAL DAILY
Status: DISCONTINUED | OUTPATIENT
Start: 2021-08-03 | End: 2021-08-03

## 2021-08-02 RX ORDER — ACETAMINOPHEN 325 MG/1
650 TABLET ORAL EVERY 4 HOURS PRN
Status: DISCONTINUED | OUTPATIENT
Start: 2021-08-02 | End: 2021-08-04 | Stop reason: HOSPADM

## 2021-08-02 RX ORDER — SODIUM CHLORIDE 9 MG/ML
INJECTION, SOLUTION INTRAVENOUS CONTINUOUS
Status: DISCONTINUED | OUTPATIENT
Start: 2021-08-02 | End: 2021-08-04 | Stop reason: HOSPADM

## 2021-08-02 RX ORDER — GLUCAGON 1 MG
1 KIT INJECTION
Status: DISCONTINUED | OUTPATIENT
Start: 2021-08-02 | End: 2021-08-04 | Stop reason: HOSPADM

## 2021-08-02 RX ORDER — ONDANSETRON 8 MG/1
8 TABLET, ORALLY DISINTEGRATING ORAL EVERY 8 HOURS PRN
Status: DISCONTINUED | OUTPATIENT
Start: 2021-08-02 | End: 2021-08-04 | Stop reason: HOSPADM

## 2021-08-02 RX ORDER — ONDANSETRON 2 MG/ML
4 INJECTION INTRAMUSCULAR; INTRAVENOUS EVERY 6 HOURS PRN
Status: DISCONTINUED | OUTPATIENT
Start: 2021-08-02 | End: 2021-08-04 | Stop reason: HOSPADM

## 2021-08-02 RX ORDER — SODIUM CHLORIDE 0.9 % (FLUSH) 0.9 %
10 SYRINGE (ML) INJECTION EVERY 8 HOURS
Status: DISCONTINUED | OUTPATIENT
Start: 2021-08-02 | End: 2021-08-04 | Stop reason: HOSPADM

## 2021-08-02 RX ORDER — INSULIN ASPART 100 [IU]/ML
0-5 INJECTION, SOLUTION INTRAVENOUS; SUBCUTANEOUS
Status: DISCONTINUED | OUTPATIENT
Start: 2021-08-02 | End: 2021-08-04 | Stop reason: HOSPADM

## 2021-08-02 RX ORDER — POLYETHYLENE GLYCOL 3350 17 G/17G
17 POWDER, FOR SOLUTION ORAL DAILY PRN
Status: DISCONTINUED | OUTPATIENT
Start: 2021-08-02 | End: 2021-08-04 | Stop reason: HOSPADM

## 2021-08-02 RX ORDER — ATORVASTATIN CALCIUM 40 MG/1
80 TABLET, FILM COATED ORAL DAILY
Status: DISCONTINUED | OUTPATIENT
Start: 2021-08-03 | End: 2021-08-03

## 2021-08-02 RX ORDER — TALC
6 POWDER (GRAM) TOPICAL NIGHTLY PRN
Status: DISCONTINUED | OUTPATIENT
Start: 2021-08-02 | End: 2021-08-04 | Stop reason: HOSPADM

## 2021-08-02 RX ADMIN — SODIUM ZIRCONIUM CYCLOSILICATE 10 G: 5 POWDER, FOR SUSPENSION ORAL at 08:08

## 2021-08-02 RX ADMIN — ONDANSETRON 4 MG: 2 INJECTION INTRAMUSCULAR; INTRAVENOUS at 08:08

## 2021-08-02 RX ADMIN — SODIUM CHLORIDE: 0.9 INJECTION, SOLUTION INTRAVENOUS at 11:08

## 2021-08-02 RX ADMIN — SODIUM CHLORIDE 500 ML: 0.9 INJECTION, SOLUTION INTRAVENOUS at 08:08

## 2021-08-02 RX ADMIN — SODIUM CHLORIDE: 0.9 INJECTION, SOLUTION INTRAVENOUS at 08:08

## 2021-08-03 LAB
ALBUMIN SERPL BCP-MCNC: 2.8 G/DL (ref 3.5–5.2)
ALBUMIN SERPL BCP-MCNC: 2.8 G/DL (ref 3.5–5.2)
ALP SERPL-CCNC: 382 U/L (ref 55–135)
ALP SERPL-CCNC: 388 U/L (ref 55–135)
ALT SERPL W/O P-5'-P-CCNC: 44 U/L (ref 10–44)
ALT SERPL W/O P-5'-P-CCNC: 45 U/L (ref 10–44)
AMMONIA PLAS-SCNC: 69 UMOL/L (ref 10–50)
ANION GAP SERPL CALC-SCNC: 26 MMOL/L (ref 8–16)
ANION GAP SERPL CALC-SCNC: 26 MMOL/L (ref 8–16)
AST SERPL-CCNC: 391 U/L (ref 10–40)
AST SERPL-CCNC: 394 U/L (ref 10–40)
BASOPHILS # BLD AUTO: 0.02 K/UL (ref 0–0.2)
BASOPHILS NFR BLD: 0.2 % (ref 0–1.9)
BILIRUB SERPL-MCNC: 4.7 MG/DL (ref 0.1–1)
BILIRUB SERPL-MCNC: 4.7 MG/DL (ref 0.1–1)
BUN SERPL-MCNC: 127 MG/DL (ref 8–23)
BUN SERPL-MCNC: 131 MG/DL (ref 8–23)
CALCIUM SERPL-MCNC: 8.2 MG/DL (ref 8.7–10.5)
CALCIUM SERPL-MCNC: 8.3 MG/DL (ref 8.7–10.5)
CHLORIDE SERPL-SCNC: 97 MMOL/L (ref 95–110)
CHLORIDE SERPL-SCNC: 98 MMOL/L (ref 95–110)
CO2 SERPL-SCNC: 10 MMOL/L (ref 23–29)
CO2 SERPL-SCNC: 9 MMOL/L (ref 23–29)
CREAT SERPL-MCNC: 6.4 MG/DL (ref 0.5–1.4)
CREAT SERPL-MCNC: 6.4 MG/DL (ref 0.5–1.4)
DIFFERENTIAL METHOD: ABNORMAL
EOSINOPHIL # BLD AUTO: 0 K/UL (ref 0–0.5)
EOSINOPHIL NFR BLD: 0 % (ref 0–8)
ERYTHROCYTE [DISTWIDTH] IN BLOOD BY AUTOMATED COUNT: 20.3 % (ref 11.5–14.5)
EST. GFR  (AFRICAN AMERICAN): 7 ML/MIN/1.73 M^2
EST. GFR  (AFRICAN AMERICAN): 7 ML/MIN/1.73 M^2
EST. GFR  (NON AFRICAN AMERICAN): 6 ML/MIN/1.73 M^2
EST. GFR  (NON AFRICAN AMERICAN): 6 ML/MIN/1.73 M^2
ETHANOL SERPL-MCNC: <10 MG/DL
GLUCOSE SERPL-MCNC: 64 MG/DL (ref 70–110)
GLUCOSE SERPL-MCNC: 66 MG/DL (ref 70–110)
HCT VFR BLD AUTO: 26.4 % (ref 37–48.5)
HGB BLD-MCNC: 7.8 G/DL (ref 12–16)
IMM GRANULOCYTES # BLD AUTO: 0.3 K/UL (ref 0–0.04)
IMM GRANULOCYTES NFR BLD AUTO: 3.7 % (ref 0–0.5)
LYMPHOCYTES # BLD AUTO: 0.7 K/UL (ref 1–4.8)
LYMPHOCYTES NFR BLD: 8.9 % (ref 18–48)
MAGNESIUM SERPL-MCNC: 2.9 MG/DL (ref 1.6–2.6)
MCH RBC QN AUTO: 26.9 PG (ref 27–31)
MCHC RBC AUTO-ENTMCNC: 29.5 G/DL (ref 32–36)
MCV RBC AUTO: 91 FL (ref 82–98)
MONOCYTES # BLD AUTO: 0.5 K/UL (ref 0.3–1)
MONOCYTES NFR BLD: 6.4 % (ref 4–15)
NEUTROPHILS # BLD AUTO: 6.6 K/UL (ref 1.8–7.7)
NEUTROPHILS NFR BLD: 80.8 % (ref 38–73)
NRBC BLD-RTO: 0 /100 WBC
PHOSPHATE SERPL-MCNC: 8.3 MG/DL (ref 2.7–4.5)
PLATELET # BLD AUTO: 176 K/UL (ref 150–450)
PMV BLD AUTO: 10.2 FL (ref 9.2–12.9)
POCT GLUCOSE: 108 MG/DL (ref 70–110)
POCT GLUCOSE: 91 MG/DL (ref 70–110)
POTASSIUM SERPL-SCNC: 6.1 MMOL/L (ref 3.5–5.1)
POTASSIUM SERPL-SCNC: 6.3 MMOL/L (ref 3.5–5.1)
PROT SERPL-MCNC: 6.4 G/DL (ref 6–8.4)
PROT SERPL-MCNC: 6.4 G/DL (ref 6–8.4)
RBC # BLD AUTO: 2.9 M/UL (ref 4–5.4)
SODIUM SERPL-SCNC: 133 MMOL/L (ref 136–145)
SODIUM SERPL-SCNC: 133 MMOL/L (ref 136–145)
WBC # BLD AUTO: 8.17 K/UL (ref 3.9–12.7)

## 2021-08-03 PROCEDURE — 82140 ASSAY OF AMMONIA: CPT | Performed by: FAMILY MEDICINE

## 2021-08-03 PROCEDURE — 25000003 PHARM REV CODE 250: Performed by: INTERNAL MEDICINE

## 2021-08-03 PROCEDURE — 51798 US URINE CAPACITY MEASURE: CPT

## 2021-08-03 PROCEDURE — 92610 EVALUATE SWALLOWING FUNCTION: CPT

## 2021-08-03 PROCEDURE — 97161 PT EVAL LOW COMPLEX 20 MIN: CPT

## 2021-08-03 PROCEDURE — 99497 PR ADVNCD CARE PLAN 30 MIN: ICD-10-PCS | Mod: 25,,, | Performed by: NURSE PRACTITIONER

## 2021-08-03 PROCEDURE — 36415 COLL VENOUS BLD VENIPUNCTURE: CPT | Performed by: FAMILY MEDICINE

## 2021-08-03 PROCEDURE — 11000001 HC ACUTE MED/SURG PRIVATE ROOM

## 2021-08-03 PROCEDURE — 99223 1ST HOSP IP/OBS HIGH 75: CPT | Mod: ,,, | Performed by: INTERNAL MEDICINE

## 2021-08-03 PROCEDURE — 99223 PR INITIAL HOSPITAL CARE,LEVL III: ICD-10-PCS | Mod: ,,, | Performed by: INTERNAL MEDICINE

## 2021-08-03 PROCEDURE — 97530 THERAPEUTIC ACTIVITIES: CPT

## 2021-08-03 PROCEDURE — 97116 GAIT TRAINING THERAPY: CPT

## 2021-08-03 PROCEDURE — 84100 ASSAY OF PHOSPHORUS: CPT | Performed by: INTERNAL MEDICINE

## 2021-08-03 PROCEDURE — 99233 SBSQ HOSP IP/OBS HIGH 50: CPT | Mod: ,,, | Performed by: INTERNAL MEDICINE

## 2021-08-03 PROCEDURE — 63600175 PHARM REV CODE 636 W HCPCS: Performed by: NURSE PRACTITIONER

## 2021-08-03 PROCEDURE — 99222 PR INITIAL HOSPITAL CARE,LEVL II: ICD-10-PCS | Mod: ,,, | Performed by: NURSE PRACTITIONER

## 2021-08-03 PROCEDURE — 85025 COMPLETE CBC W/AUTO DIFF WBC: CPT | Performed by: INTERNAL MEDICINE

## 2021-08-03 PROCEDURE — 82077 ASSAY SPEC XCP UR&BREATH IA: CPT | Performed by: FAMILY MEDICINE

## 2021-08-03 PROCEDURE — 83735 ASSAY OF MAGNESIUM: CPT | Performed by: INTERNAL MEDICINE

## 2021-08-03 PROCEDURE — 97166 OT EVAL MOD COMPLEX 45 MIN: CPT

## 2021-08-03 PROCEDURE — 80053 COMPREHEN METABOLIC PANEL: CPT | Performed by: INTERNAL MEDICINE

## 2021-08-03 PROCEDURE — 99222 1ST HOSP IP/OBS MODERATE 55: CPT | Mod: ,,, | Performed by: NURSE PRACTITIONER

## 2021-08-03 PROCEDURE — 36415 COLL VENOUS BLD VENIPUNCTURE: CPT | Performed by: INTERNAL MEDICINE

## 2021-08-03 PROCEDURE — 99497 ADVNCD CARE PLAN 30 MIN: CPT | Mod: 25,,, | Performed by: NURSE PRACTITIONER

## 2021-08-03 PROCEDURE — 99233 PR SUBSEQUENT HOSPITAL CARE,LEVL III: ICD-10-PCS | Mod: ,,, | Performed by: INTERNAL MEDICINE

## 2021-08-03 RX ORDER — SODIUM BICARBONATE 1 MEQ/ML
50 SYRINGE (ML) INTRAVENOUS ONCE
Status: COMPLETED | OUTPATIENT
Start: 2021-08-03 | End: 2021-08-03

## 2021-08-03 RX ORDER — TAMOXIFEN CITRATE 10 MG/1
20 TABLET ORAL DAILY
Status: DISCONTINUED | OUTPATIENT
Start: 2021-08-03 | End: 2021-08-04 | Stop reason: HOSPADM

## 2021-08-03 RX ORDER — SODIUM BICARBONATE 650 MG/1
650 TABLET ORAL 3 TIMES DAILY
Status: DISCONTINUED | OUTPATIENT
Start: 2021-08-03 | End: 2021-08-04 | Stop reason: HOSPADM

## 2021-08-03 RX ORDER — HYDROCODONE BITARTRATE AND ACETAMINOPHEN 5; 325 MG/1; MG/1
1 TABLET ORAL EVERY 8 HOURS PRN
Status: DISCONTINUED | OUTPATIENT
Start: 2021-08-03 | End: 2021-08-04 | Stop reason: HOSPADM

## 2021-08-03 RX ADMIN — SODIUM BICARBONATE 650 MG: 650 TABLET ORAL at 03:08

## 2021-08-03 RX ADMIN — ATORVASTATIN CALCIUM 80 MG: 40 TABLET, FILM COATED ORAL at 10:08

## 2021-08-03 RX ADMIN — SODIUM BICARBONATE 650 MG: 650 TABLET ORAL at 09:08

## 2021-08-03 RX ADMIN — SODIUM BICARBONATE 50 MEQ: 84 INJECTION, SOLUTION INTRAVENOUS at 04:08

## 2021-08-03 RX ADMIN — HYDROCODONE BITARTRATE AND ACETAMINOPHEN 1 TABLET: 5; 325 TABLET ORAL at 09:08

## 2021-08-03 RX ADMIN — SODIUM ZIRCONIUM CYCLOSILICATE 10 G: 5 POWDER, FOR SUSPENSION ORAL at 08:08

## 2021-08-03 RX ADMIN — AMLODIPINE BESYLATE 10 MG: 10 TABLET ORAL at 10:08

## 2021-08-03 RX ADMIN — TAMOXIFEN CITRATE 20 MG: 10 TABLET, FILM COATED ORAL at 10:08

## 2021-08-03 RX ADMIN — SODIUM BICARBONATE 650 MG: 650 TABLET ORAL at 10:08

## 2021-08-03 RX ADMIN — ONDANSETRON 4 MG: 2 INJECTION INTRAMUSCULAR; INTRAVENOUS at 09:08

## 2021-08-03 RX ADMIN — SODIUM ZIRCONIUM CYCLOSILICATE 10 G: 5 POWDER, FOR SUSPENSION ORAL at 09:08

## 2021-08-03 RX ADMIN — SODIUM ZIRCONIUM CYCLOSILICATE 10 G: 5 POWDER, FOR SUSPENSION ORAL at 03:08

## 2021-08-04 VITALS
HEART RATE: 98 BPM | TEMPERATURE: 98 F | DIASTOLIC BLOOD PRESSURE: 55 MMHG | RESPIRATION RATE: 18 BRPM | OXYGEN SATURATION: 96 % | WEIGHT: 121.94 LBS | HEIGHT: 60 IN | BODY MASS INDEX: 23.94 KG/M2 | SYSTOLIC BLOOD PRESSURE: 110 MMHG

## 2021-08-04 LAB
ALBUMIN SERPL BCP-MCNC: 2.3 G/DL (ref 3.5–5.2)
ALP SERPL-CCNC: 464 U/L (ref 55–135)
ALT SERPL W/O P-5'-P-CCNC: 64 U/L (ref 10–44)
ANION GAP SERPL CALC-SCNC: 25 MMOL/L (ref 8–16)
AST SERPL-CCNC: 519 U/L (ref 10–40)
BASOPHILS # BLD AUTO: 0.01 K/UL (ref 0–0.2)
BASOPHILS NFR BLD: 0.1 % (ref 0–1.9)
BILIRUB SERPL-MCNC: 4.3 MG/DL (ref 0.1–1)
BUN SERPL-MCNC: 123 MG/DL (ref 8–23)
CALCIUM SERPL-MCNC: 7.7 MG/DL (ref 8.7–10.5)
CHLORIDE SERPL-SCNC: 98 MMOL/L (ref 95–110)
CO2 SERPL-SCNC: 11 MMOL/L (ref 23–29)
CREAT SERPL-MCNC: 6.3 MG/DL (ref 0.5–1.4)
DIFFERENTIAL METHOD: ABNORMAL
EOSINOPHIL # BLD AUTO: 0 K/UL (ref 0–0.5)
EOSINOPHIL NFR BLD: 0.1 % (ref 0–8)
ERYTHROCYTE [DISTWIDTH] IN BLOOD BY AUTOMATED COUNT: 20.5 % (ref 11.5–14.5)
EST. GFR  (AFRICAN AMERICAN): 7 ML/MIN/1.73 M^2
EST. GFR  (NON AFRICAN AMERICAN): 6 ML/MIN/1.73 M^2
GLUCOSE SERPL-MCNC: 151 MG/DL (ref 70–110)
HCT VFR BLD AUTO: 22.2 % (ref 37–48.5)
HGB BLD-MCNC: 6.7 G/DL (ref 12–16)
IMM GRANULOCYTES # BLD AUTO: 0.23 K/UL (ref 0–0.04)
IMM GRANULOCYTES NFR BLD AUTO: 2.8 % (ref 0–0.5)
LYMPHOCYTES # BLD AUTO: 0.6 K/UL (ref 1–4.8)
LYMPHOCYTES NFR BLD: 6.9 % (ref 18–48)
MCH RBC QN AUTO: 26.6 PG (ref 27–31)
MCHC RBC AUTO-ENTMCNC: 30.2 G/DL (ref 32–36)
MCV RBC AUTO: 88 FL (ref 82–98)
MONOCYTES # BLD AUTO: 0.5 K/UL (ref 0.3–1)
MONOCYTES NFR BLD: 5.4 % (ref 4–15)
NEUTROPHILS # BLD AUTO: 7 K/UL (ref 1.8–7.7)
NEUTROPHILS NFR BLD: 84.7 % (ref 38–73)
NRBC BLD-RTO: 0 /100 WBC
PLATELET # BLD AUTO: 150 K/UL (ref 150–450)
PMV BLD AUTO: 9.8 FL (ref 9.2–12.9)
POCT GLUCOSE: 105 MG/DL (ref 70–110)
POCT GLUCOSE: 111 MG/DL (ref 70–110)
POTASSIUM SERPL-SCNC: 5.6 MMOL/L (ref 3.5–5.1)
PROT SERPL-MCNC: 5.4 G/DL (ref 6–8.4)
RBC # BLD AUTO: 2.52 M/UL (ref 4–5.4)
SODIUM SERPL-SCNC: 134 MMOL/L (ref 136–145)
WBC # BLD AUTO: 8.3 K/UL (ref 3.9–12.7)

## 2021-08-04 PROCEDURE — 97110 THERAPEUTIC EXERCISES: CPT | Performed by: PHYSICAL THERAPIST

## 2021-08-04 PROCEDURE — 99233 PR SUBSEQUENT HOSPITAL CARE,LEVL III: ICD-10-PCS | Mod: ,,, | Performed by: INTERNAL MEDICINE

## 2021-08-04 PROCEDURE — 25000003 PHARM REV CODE 250: Performed by: INTERNAL MEDICINE

## 2021-08-04 PROCEDURE — 36415 COLL VENOUS BLD VENIPUNCTURE: CPT | Performed by: INTERNAL MEDICINE

## 2021-08-04 PROCEDURE — 99233 SBSQ HOSP IP/OBS HIGH 50: CPT | Mod: ,,, | Performed by: INTERNAL MEDICINE

## 2021-08-04 PROCEDURE — 80053 COMPREHEN METABOLIC PANEL: CPT | Performed by: INTERNAL MEDICINE

## 2021-08-04 PROCEDURE — 85025 COMPLETE CBC W/AUTO DIFF WBC: CPT | Performed by: INTERNAL MEDICINE

## 2021-08-04 RX ADMIN — ACETAMINOPHEN 650 MG: 325 TABLET ORAL at 12:08

## 2021-08-04 RX ADMIN — SODIUM CHLORIDE 100 ML/HR: 0.9 INJECTION, SOLUTION INTRAVENOUS at 08:08

## 2021-08-04 RX ADMIN — SODIUM ZIRCONIUM CYCLOSILICATE 10 G: 5 POWDER, FOR SUSPENSION ORAL at 08:08

## 2021-08-04 RX ADMIN — SODIUM BICARBONATE 650 MG: 650 TABLET ORAL at 08:08

## 2021-08-04 RX ADMIN — HYDROCODONE BITARTRATE AND ACETAMINOPHEN 1 TABLET: 5; 325 TABLET ORAL at 08:08

## 2021-08-04 RX ADMIN — TAMOXIFEN CITRATE 20 MG: 10 TABLET, FILM COATED ORAL at 08:08

## 2021-08-05 ENCOUNTER — TELEPHONE (OUTPATIENT)
Dept: INTERNAL MEDICINE | Facility: CLINIC | Age: 71
End: 2021-08-05

## 2021-08-23 ENCOUNTER — TELEPHONE (OUTPATIENT)
Dept: ADMINISTRATIVE | Facility: HOSPITAL | Age: 71
End: 2021-08-23

## 2021-08-23 ENCOUNTER — TELEPHONE (OUTPATIENT)
Dept: INTERNAL MEDICINE | Facility: CLINIC | Age: 71
End: 2021-08-23